# Patient Record
Sex: MALE | Race: WHITE | NOT HISPANIC OR LATINO | Employment: OTHER | URBAN - METROPOLITAN AREA
[De-identification: names, ages, dates, MRNs, and addresses within clinical notes are randomized per-mention and may not be internally consistent; named-entity substitution may affect disease eponyms.]

---

## 2017-01-05 ENCOUNTER — HOSPITAL ENCOUNTER (OUTPATIENT)
Dept: RADIATION ONCOLOGY | Facility: MEDICAL CENTER | Age: 82
End: 2017-01-31
Attending: RADIOLOGY
Payer: MEDICARE

## 2017-01-05 PROCEDURE — 99205 OFFICE O/P NEW HI 60 MIN: CPT | Performed by: RADIOLOGY

## 2017-01-05 PROCEDURE — 99214 OFFICE O/P EST MOD 30 MIN: CPT | Performed by: RADIOLOGY

## 2017-01-06 ENCOUNTER — HOME CARE VISIT (OUTPATIENT)
Dept: HOME HEALTH SERVICES | Facility: HOME HEALTHCARE | Age: 82
End: 2017-01-06
Payer: MEDICARE

## 2017-01-06 NOTE — CONSULTS
DATE OF SERVICE:  01/05/2017    DIAGNOSIS:  Stage II (T2 N0 M0) recurrent basal cell carcinoma of the left   nasal ala, status post Erivedge.    HISTORY OF PRESENT ILLNESS:  I had the pleasure of seeing the patient today in   consultation at the request of Dr. Faviola Lund for his recurrent basal   cell carcinoma.  Patient's son-in-law states that he feels his first basal   cell carcinoma in this location was greater than 10 years ago, which was   resected.  It did start slowly recurring several years ago, but then increased   in its aggressiveness.  He tried topical medications for this that progressed   through and most recently was sent to Dr. Lund as it was becoming more   ulcerative and bothersome in nature.  When she saw him the disease that   extended through the full thickness of the nasal ala all the way down to the   level of the nasal septum, this did not invade the nasal septum, but was   pushing border from the nasal ala.  An attempt to try to regress the disease   and make it a surgical possibility, she started him on Erivedge; however, he   was not able to continue the medication due to poor tolerance.  It did,   however, shrink somewhat in the blockage of his left nostril improved with   space between the septum and the tumor.  Currently it measures roughly 2.5x2.5   cm with a 1.5 cm thickness.  It does have an eroded surface, but no nivia   ulceration and no extension into adjacent structures.  I have been asked to   see him for consideration of definitive radiotherapy to this area.    PAST MEDICAL HISTORY:  Significant for stage IV kidney disease,   gastroesophageal reflux disorder, presumed prostate cancer, but not biopsy   proven, under active surveillance.    MEDICATIONS:  Include Prilosec, Atarax, and Prevnar.    ALLERGIES:  No known drug allergies.    FAMILY HISTORY:  Noncontributory for malignancy.    SOCIAL HISTORY:  Patient lives in [facility] in [facility].    His  emrlin is helping him with travel to his medical appointments.  He is a   retired [employer] professor.  He did have a remote smoking history of a half pack   per day.  Despite chronologic age of 89, he does roughly 100 pushups per day   and he is quite active.    PHYSICAL EXAMINATION:  VITAL SIGNS:  Patient's weight is 114 pounds, temperature 98.2, pulse of 92,   respirations of 16, blood pressure 158/73, oxygen saturation of 97% on room   oxygen.  Pain scale 0/10.  Performance status, ECOG 0.  EYES:  Reveals equally reactive pupils.  HEENT:  Reveals no lesions in the oral cavity or oropharynx.  The nostril on   the left side is compromised from the pushing border of the nasal ala tumor.    There is no direct extension into the septum or other nasal structures.  LUNGS:  Clear to auscultation bilaterally.  HEART:  Normal S1, S2.  ABDOMEN:  Soft and nontender.  MUSCULOSKELETAL:  Reveals no acute areas of bony tenderness or deformity.  NEUROLOGIC:  Grossly intact.  SKIN:  Reveals a 2.5x2.5x1.5 cm thick basal cell carcinoma in the right nasal   ala extending from the nasal crease, but not extending to the tip of nose.  It   encompasses the lower border of the nostril.  It has erosion, but no nivia   ulceration and there is no palpable lymphadenopathy.    ASSESSMENT:  Stage II (T2 N0 M0), recurrent basal cell carcinoma of the left   nasal ala, status post Erivedge.    PLAN:  Discussed with the patient and his stepson his diagnosis, prognosis,   and treatment options over roughly 1 hour and 5 minute time period, 95% of   that time dedicated to management decisions.  We distinguished with the   patient different skin cancers including basal cell carcinoma.  We discussed   the slower growing nature and locally destructive nature of basal cell as   opposed to regional spread.  This does extend through the cartilage making   some of the control rates slightly lower with radiotherapy; however, this is   not surgically appropriate  size and location and he has failed medication.    Therefore, radiation is a good treatment option for him.  I had my physics and   dosimetry team evaluate him with me to prepare for simulation.  We anticipate   superficial radiotherapy to 5750 cGy given in 23 equal fractions using   electron beam radiation, this will require custom bolus and a head   immobilization device for better setup and reproducibility.  After discussing   all the risks, benefits, and side effects of therapy and patient was amenable   to treatment and will come back next week for simulation.       ____________________________________     MD RONDA Kruse / N[facility]    DD:  01/05/2017 10:17:26  DT:  01/05/2017 17:58:40    D#:  [id number]  Job#:  [id number]    cc: ALLY GIBSON MD, Dr. Faviola Lund

## 2017-01-10 PROCEDURE — 77263 THER RADIOLOGY TX PLNG CPLX: CPT | Performed by: RADIOLOGY

## 2017-01-10 PROCEDURE — 77334 RADIATION TREATMENT AID(S): CPT | Mod: 26 | Performed by: RADIOLOGY

## 2017-01-10 PROCEDURE — 77334 RADIATION TREATMENT AID(S): CPT | Performed by: RADIOLOGY

## 2017-01-10 PROCEDURE — 77290 THER RAD SIMULAJ FIELD CPLX: CPT | Mod: 26 | Performed by: RADIOLOGY

## 2017-01-10 PROCEDURE — 77290 THER RAD SIMULAJ FIELD CPLX: CPT | Performed by: RADIOLOGY

## 2017-01-11 ASSESSMENT — ACTIVITIES OF DAILY LIVING (ADL)
OASIS_M1830: 01
HOME_HEALTH_OASIS: 01
HOME_HEALTH_OASIS: 00

## 2017-01-16 PROCEDURE — 77295 3-D RADIOTHERAPY PLAN: CPT | Performed by: RADIOLOGY

## 2017-01-16 PROCEDURE — 77334 RADIATION TREATMENT AID(S): CPT | Performed by: RADIOLOGY

## 2017-01-16 PROCEDURE — 77295 3-D RADIOTHERAPY PLAN: CPT | Mod: 26 | Performed by: RADIOLOGY

## 2017-01-16 PROCEDURE — 77334 RADIATION TREATMENT AID(S): CPT | Mod: 26 | Performed by: RADIOLOGY

## 2017-01-16 PROCEDURE — 77300 RADIATION THERAPY DOSE PLAN: CPT | Mod: 26 | Performed by: RADIOLOGY

## 2017-01-16 PROCEDURE — 77300 RADIATION THERAPY DOSE PLAN: CPT | Performed by: RADIOLOGY

## 2017-01-17 PROCEDURE — 77280 THER RAD SIMULAJ FIELD SMPL: CPT | Performed by: RADIOLOGY

## 2017-01-17 PROCEDURE — 77412 RADIATION TX DELIVERY LVL 3: CPT | Performed by: RADIOLOGY

## 2017-01-18 ENCOUNTER — APPOINTMENT (OUTPATIENT)
Dept: RADIATION ONCOLOGY | Facility: MEDICAL CENTER | Age: 82
End: 2017-01-18
Attending: RADIOLOGY
Payer: MEDICARE

## 2017-01-18 PROCEDURE — 77412 RADIATION TX DELIVERY LVL 3: CPT | Performed by: RADIOLOGY

## 2017-01-19 PROCEDURE — 77412 RADIATION TX DELIVERY LVL 3: CPT | Performed by: RADIOLOGY

## 2017-01-19 PROCEDURE — 77336 RADIATION PHYSICS CONSULT: CPT | Performed by: RADIOLOGY

## 2017-01-20 PROCEDURE — 77412 RADIATION TX DELIVERY LVL 3: CPT | Performed by: RADIOLOGY

## 2017-01-23 PROCEDURE — 77412 RADIATION TX DELIVERY LVL 3: CPT | Performed by: RADIOLOGY

## 2017-01-23 PROCEDURE — 77427 RADIATION TX MANAGEMENT X5: CPT | Performed by: RADIOLOGY

## 2017-01-24 ENCOUNTER — TELEPHONE (OUTPATIENT)
Dept: INTERNAL MEDICINE | Facility: MEDICAL CENTER | Age: 82
End: 2017-01-24

## 2017-01-24 DIAGNOSIS — H61.20 IMPACTED CERUMEN, UNSPECIFIED LATERALITY: ICD-10-CM

## 2017-01-24 PROCEDURE — 77412 RADIATION TX DELIVERY LVL 3: CPT | Performed by: RADIOLOGY

## 2017-01-24 NOTE — TELEPHONE ENCOUNTER
----- Message from Claire Cotton sent at 1/24/2017  8:36 AM PST -----  Regarding: Dr Cm/ENT doctor  Contact: [phone]  Patients son (Da) would like to know if you could refer dad to a ENT doctor for wax build up in his ears.

## 2017-01-24 NOTE — TELEPHONE ENCOUNTER
i put referral in.   When i looked in July there was no wax  If he wants, he can come here and we can see if there is wax that needs to be removed.   We may or may not be able to remove.    Also, he could go to his audiologist who might be able to remove it.

## 2017-01-24 NOTE — TELEPHONE ENCOUNTER
Called pt and spoke with Da. He said pt audiologist said to go to his dr to remove wax. He said we can schedule him an appt here. Scheduled pt to be seen tomorrow on 01/25/17 with Dr Ramirez

## 2017-01-25 ENCOUNTER — OFFICE VISIT (OUTPATIENT)
Dept: INTERNAL MEDICINE | Facility: MEDICAL CENTER | Age: 82
End: 2017-01-25
Payer: MEDICARE

## 2017-01-25 VITALS
RESPIRATION RATE: 16 BRPM | OXYGEN SATURATION: 95 % | SYSTOLIC BLOOD PRESSURE: 116 MMHG | BODY MASS INDEX: 22.01 KG/M2 | HEART RATE: 74 BPM | HEIGHT: 61 IN | TEMPERATURE: 98.2 F | DIASTOLIC BLOOD PRESSURE: 76 MMHG | WEIGHT: 116.6 LBS

## 2017-01-25 DIAGNOSIS — H61.23 BILATERAL IMPACTED CERUMEN: ICD-10-CM

## 2017-01-25 PROCEDURE — 77412 RADIATION TX DELIVERY LVL 3: CPT | Performed by: RADIOLOGY

## 2017-01-25 PROCEDURE — G8419 CALC BMI OUT NRM PARAM NOF/U: HCPCS | Mod: GC | Performed by: INTERNAL MEDICINE

## 2017-01-25 PROCEDURE — G8432 DEP SCR NOT DOC, RNG: HCPCS | Mod: GC | Performed by: INTERNAL MEDICINE

## 2017-01-25 PROCEDURE — 77331 SPECIAL RADIATION DOSIMETRY: CPT | Performed by: RADIOLOGY

## 2017-01-25 PROCEDURE — 1100F PTFALLS ASSESS-DOCD GE2>/YR: CPT | Mod: GC | Performed by: INTERNAL MEDICINE

## 2017-01-25 PROCEDURE — 4040F PNEUMOC VAC/ADMIN/RCVD: CPT | Mod: GC | Performed by: INTERNAL MEDICINE

## 2017-01-25 PROCEDURE — G8482 FLU IMMUNIZE ORDER/ADMIN: HCPCS | Mod: GC | Performed by: INTERNAL MEDICINE

## 2017-01-25 PROCEDURE — 3288F FALL RISK ASSESSMENT DOCD: CPT | Mod: GC | Performed by: INTERNAL MEDICINE

## 2017-01-25 PROCEDURE — 0518F FALL PLAN OF CARE DOCD: CPT | Mod: 8P,GC | Performed by: INTERNAL MEDICINE

## 2017-01-25 PROCEDURE — 1036F TOBACCO NON-USER: CPT | Mod: GC | Performed by: INTERNAL MEDICINE

## 2017-01-25 PROCEDURE — 77331 SPECIAL RADIATION DOSIMETRY: CPT | Mod: 26 | Performed by: RADIOLOGY

## 2017-01-25 PROCEDURE — 99213 OFFICE O/P EST LOW 20 MIN: CPT | Mod: GE | Performed by: INTERNAL MEDICINE

## 2017-01-25 ASSESSMENT — PATIENT HEALTH QUESTIONNAIRE - PHQ9: CLINICAL INTERPRETATION OF PHQ2 SCORE: 0

## 2017-01-25 NOTE — PROGRESS NOTES
Established Patient    Emmanuel presents today with the following:    CC: Ear wax removal    HPI: Patient is a pleasant 89-year-old male with multiple comorbidities who comes in today complaining of left ear pain due to cerumen impaction. Patient states he has had this problem before in the recent years and has had ENT doctors remove the wax. Yesterday afternoon doctor to come on placed a referral for ENT and scheduled him to come in today for wax removal. He comes in today because the pain in his left ear is increasing. He denies fevers, changes in hearing, dizziness, vertigo, headache or discharge from either ear.    Patient Active Problem List    Diagnosis Date Noted   • Physician orders for life-sustaining treatment (POLST) form indicates patient wish for do-not-resuscitate status 12/07/2016   • Ingrown toenail without infection 11/30/2016   • Onychomycosis 11/30/2016   • Hematoma of toe of left foot 11/30/2016   • Nonpalpable pulse 11/30/2016   • Toe pain, left 11/30/2016   • Anemia, unspecified 08/26/2016   • Leukocytosis 08/26/2016   • Insomnia 07/07/2016   • Short-term memory loss 07/07/2016   • CKD (chronic kidney disease) stage 4, GFR 15-29 ml/min (CMS-HCC) 07/07/2016   • GERD without esophagitis 07/07/2016   • Benign essential HTN 07/07/2016   • Anemia of unknown etiology 07/07/2016   • Lower urinary tract symptoms 07/07/2016   • 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 90 Cap 3   • hydrOXYzine (ATARAX) 25 MG Tab Take 0.5 Tabs by mouth 3 times a day as needed for Anxiety. 30 Tab 0   •  "non-formulary med Indications: multiple vits & herbs     • NON SPECIFIED Shitake Mushrooms capsule. SIG: Take two capsules po TID     • PREVNAR 13 syringe        No current facility-administered medications for this visit.       ROS: As per HPI. Additional pertinent symptoms as noted below.    Constitutional: No fever/chills  GEN: Denies anxiety  Neuro: No headache, dizziness, tinnitus  ENT: No vision changes, mild decrease in hearing  CV: No chest pain, palpitations  Resp: No cough  GI: N/V, diarrhea, constipation, abdominal pain rectal bleeding  Skin: No rashes  : No dysuria, nocturia, hesitancy, incontinence, hematuria  Musculoskeletal: No muscle/joint pain, edema          /76 mmHg  Pulse 74  Temp(Src) 36.8 °C (98.2 °F)  Resp 16  Ht 1.549 m (5' 1\")  Wt 52.889 kg (116 lb 9.6 oz)  BMI 22.04 kg/m2  SpO2 95%    Physical Exam   Constitutional:  oriented to person, place, and time. No distress.   Eyes: Pupils are equal, round, and reactive to light. No scleral icterus.  Neck: Neck supple. No thyromegaly present.   Cardiovascular: Normal rate, regular rhythm and normal heart sounds.  Exam reveals no gallop and no friction rub.  No murmur heard.  Pulmonary/Chest: Breath sounds normal. Chest wall is not dull to percussion.   Neurological: alert and oriented to person, place, and time.   Skin: No cyanosis. Nails show no clubbing.  Other: Unable to visualize tympanic membrane in left ear due to cerumen impaction. Partially visible tympanic membrane on the right side      Assessment and Plan    1. Bilateral impacted cerumen  -Pressure-like Left ear pain  -Bilateral impacted cerumen  -Used curette to remove cerumen on left side where was painful. Was able to remove a portion of the wax and relieved patient's pain.  -Advised patient to set an appointment with ENT and to use mineral oil on cotton swabs for the next couple days and then a capful of hydrogen peroxide in each ear once a day until he is able to see " ENT.        Signed by: Vesta Ramirez M.D.

## 2017-01-25 NOTE — PATIENT INSTRUCTIONS
Carbamide Peroxide ear solution  What is this medicine?  CARBAMIDE PEROXIDE (CAR carreon mide per OX jessica) is used to soften and help remove ear wax.  This medicine may be used for other purposes; ask your health care provider or pharmacist if you have questions.  COMMON BRAND NAME(S): Auro Ear, Auro Earache Relief, Debrox, Ear Drops, Ear Wax Removal , Ear Wax Remover, Earwax Treatment , Murine, Thera-Ear  What should I tell my health care provider before I take this medicine?  They need to know if you have any of these conditions:  -dizziness  -ear discharge  -ear pain, irritation or rash  -infection  -perforated eardrum (hole in eardrum)  -an unusual or allergic reaction to carbamide peroxide, glycerin, hydrogen peroxide, other medicines, foods, dyes, or preservatives  -pregnant or trying to get pregnant  -breast-feeding  How should I use this medicine?  This medicine is only for use in the outer ear canal. Follow the directions carefully. Wash hands before and after use. The solution may be warmed by holding the bottle in the hand for 1 to 2 minutes. Lie with the affected ear facing upward. Place the proper number of drops into the ear canal. After the drops are instilled, remain lying with the affected ear upward for 5 minutes to help the drops stay in the ear canal. A cotton ball may be gently inserted at the ear opening for no longer than 5 to 10 minutes to ensure retention. Repeat, if necessary, for the opposite ear. Do not touch the tip of the dropper to the ear, fingertips, or other surface. Do not rinse the dropper after use. Keep container tightly closed.  Talk to your pediatrician regarding the use of this medicine in children. While this drug may be used in children as young as 12 years for selected conditions, precautions do apply.  Overdosage: If you think you have taken too much of this medicine contact a poison control center or emergency room at once.  NOTE: This medicine is only for you. Do not share  this medicine with others.  What if I miss a dose?  If you miss a dose, use it as soon as you can. If it is almost time for your next dose, use only that dose. Do not use double or extra doses.  What may interact with this medicine?  Interactions are not expected. Do not use any other ear products without asking your doctor or health care professional.  This list may not describe all possible interactions. Give your health care provider a list of all the medicines, herbs, non-prescription drugs, or dietary supplements you use. Also tell them if you smoke, drink alcohol, or use illegal drugs. Some items may interact with your medicine.  What should I watch for while using this medicine?  This medicine is not for long-term use. Do not use for more than 4 days without checking with your health care professional. Contact your doctor or health care professional if your condition does not start to get better within a few days or if you notice burning, redness, itching or swelling.  What side effects may I notice from receiving this medicine?  Side effects that you should report to your doctor or health care professional as soon as possible:  -allergic reactions like skin rash, itching or hives, swelling of the face, lips, or tongue  -burning, itching, and redness  -worsening ear pain  -rash  Side effects that usually do not require medical attention (report to your doctor or health care professional if they continue or are bothersome):  -abnormal sensation while putting the drops in the ear  -temporary reduction in hearing (but not complete loss of hearing)  This list may not describe all possible side effects. Call your doctor for medical advice about side effects. You may report side effects to FDA at 4-165-FDA-0500.  Where should I keep my medicine?  Keep out of the reach of children.  Store at room temperature between 15 and 30 degrees C (59 and 86 degrees F) in a tight, light-resistant container. Keep bottle away  from excessive heat and direct sunlight. Throw away any unused medicine after the expiration date.  NOTE: This sheet is a summary. It may not cover all possible information. If you have questions about this medicine, talk to your doctor, pharmacist, or health care provider.  © 2014, [employer]. (3/31/2009 2:00:02 PM)      Put mineral oil on cotton ball and put in the entrance of both ears once a day for 5 days, and after that if you still have wax in ears put a capfull of hydrogen peroxide in each ear for a few seconds and then let it drain. You can do this once a day until wax build up improves.  Follow up with PCP in March for management of other co morbidities.

## 2017-01-25 NOTE — MR AVS SNAPSHOT
"Emmanuel Huerta   2017 9:30 AM   Office Visit   MRN: [id number]    Department:  [facility] Med - Internal Med   Dept Phone:  [phone]    Description:  Male : 1921   Provider:  Vesta Ramirez M.D.           Reason for Visit     Cerumen Impaction bilateral wax build up      Allergies as of 2017     No Known Allergies      You were diagnosed with     Bilateral impacted cerumen   [633811]         Vital Signs     Blood Pressure Pulse Temperature Respirations Height Weight    116/76 mmHg 74 36.8 °C (98.2 °F) 16 1.549 m (5' 1\") 52.889 kg (116 lb 9.6 oz)    Body Mass Index Oxygen Saturation Smoking Status             22.04 kg/m2 95% Former Smoker         Basic Information     Date Of Birth Sex Race Ethnicity Preferred Language    1921 Male White Non- English      Your appointments     Mar 31, 2017 10:40 AM   Established Patient with Stacy Cm M.D.   [facility] Med - Internal Medicine (--)    [address] [place] [id number] [phone]           You will be receiving a confirmation call a few days before your appointment from our automated call confirmation system.              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   3/26/2014 - Present    Insomnia G47.00   2016 - Present    Short-term memory loss R41.3   2016 - Present    CKD (chronic kidney disease) stage 4, GFR 15-29 ml/min (CMS-HCC) N18.4   2016 - Present    GERD without esophagitis K21.9   2016 - Present    Benign essential " HTN I10   7/7/2016 - Present    Anemia of unknown etiology D64.9   7/7/2016 - Present    Lower urinary tract symptoms R39.9   7/7/2016 - Present    Anemia, unspecified D64.9   8/26/2016 - Present    Leukocytosis D72.829   8/26/2016 - Present    Ingrown toenail without infection L60.0   11/30/2016 - Present    Onychomycosis B35.1   11/30/2016 - Present    Hematoma of toe of left foot S90.122A   11/30/2016 - Present    Nonpalpable pulse Z78.9   11/30/2016 - Present    Toe pain, left M79.675   11/30/2016 - Present    Physician orders for life-sustaining treatment (POLST) form indicates patient wish for do-not-resuscitate status Z66   12/7/2016 - Present      Health Maintenance        Date Due Completion Dates    IMM ZOSTER VACCINE 11/30/1981 ---    IMM DTaP/Tdap/Td Vaccine (2 - Td) 4/24/2023 4/24/2013            Current Immunizations     13-VALENT PCV PREVNAR 9/22/2016    Influenza TIV (IM) 10/1/2014, 10/2/2013    Influenza Vaccine Adult HD 11/29/2016 12:05 PM    Pneumococcal polysaccharide vaccine (PPSV-23) 7/30/2014    Tdap Vaccine 4/24/2013      Below and/or attached are the medications your provider expects you to take. Review all of your home medications and newly ordered medications with your provider and/or pharmacist. Follow medication instructions as directed by your provider and/or pharmacist. Please keep your medication list with you and share with your provider. Update the information when medications are discontinued, doses are changed, or new medications (including over-the-counter products) are added; and carry medication information at all times in the event of emergency situations     Allergies:  No Known Allergies          Medications  Valid as of: January 25, 2017 -  9:59 AM    Generic Name Brand Name Tablet Size Instructions for use    HydrOXYzine HCl (Tab) ATARAX 25 MG Take 0.5 Tabs by mouth 3 times a day as needed for Anxiety.        NON SPECIFIED   Shitake Mushrooms capsule. SIG: Take two  capsules po TID        non-formulary med non-formulary med  Indications: multiple vits & herbs        Omeprazole (CAPSULE DELAYED RELEASE) PRILOSEC 20 MG Take 1 Cap by mouth every day.        Pneumococcal 13-Naty Conj Vacc (Suspension) PREVNAR 13          .                 Medicines prescribed today were sent to:     [facility] - [place], [place] - [address]    [address] [place] [address]    Phone: [phone] Fax: [phone]    Open 24 Hours?: No      Medication refill instructions:       If your prescription bottle indicates you have medication refills left, it is not necessary to call your provider’s office. Please contact your pharmacy and they will refill your medication.    If your prescription bottle indicates you do not have any refills left, you may request refills at any time through one of the following ways: The online [employer] system (except Urgent Care), by calling your provider’s office, or by asking your pharmacy to contact your provider’s office with a refill request. Medication refills are processed only during regular business hours and may not be available until the next business day. Your provider may request additional information or to have a follow-up visit with you prior to refilling your medication.   *Please Note: Medication refills are assigned a new Rx number when refilled electronically. Your pharmacy may indicate that no refills were authorized even though a new prescription for the same medication is available at the pharmacy. Please request the medicine by name with the pharmacy before contacting your provider for a refill.        Instructions    Carbamide Peroxide ear solution  What is this medicine?  CARBAMIDE PEROXIDE (CAR carreon mide per OX jessica) is used to soften and help remove ear wax.  This medicine may be used for other purposes; ask your health care provider or pharmacist if you have questions.  COMMON BRAND NAME(S): Auro Ear, Auro Earache Relief, Debrox, Ear Drops, Ear Wax  Removal , Ear Wax Remover, Earwax Treatment , Murine, Thera-Ear  What should I tell my health care provider before I take this medicine?  They need to know if you have any of these conditions:  -dizziness  -ear discharge  -ear pain, irritation or rash  -infection  -perforated eardrum (hole in eardrum)  -an unusual or allergic reaction to carbamide peroxide, glycerin, hydrogen peroxide, other medicines, foods, dyes, or preservatives  -pregnant or trying to get pregnant  -breast-feeding  How should I use this medicine?  This medicine is only for use in the outer ear canal. Follow the directions carefully. Wash hands before and after use. The solution may be warmed by holding the bottle in the hand for 1 to 2 minutes. Lie with the affected ear facing upward. Place the proper number of drops into the ear canal. After the drops are instilled, remain lying with the affected ear upward for 5 minutes to help the drops stay in the ear canal. A cotton ball may be gently inserted at the ear opening for no longer than 5 to 10 minutes to ensure retention. Repeat, if necessary, for the opposite ear. Do not touch the tip of the dropper to the ear, fingertips, or other surface. Do not rinse the dropper after use. Keep container tightly closed.  Talk to your pediatrician regarding the use of this medicine in children. While this drug may be used in children as young as 12 years for selected conditions, precautions do apply.  Overdosage: If you think you have taken too much of this medicine contact a poison control center or emergency room at once.  NOTE: This medicine is only for you. Do not share this medicine with others.  What if I miss a dose?  If you miss a dose, use it as soon as you can. If it is almost time for your next dose, use only that dose. Do not use double or extra doses.  What may interact with this medicine?  Interactions are not expected. Do not use any other ear products without asking your doctor or health care  professional.  This list may not describe all possible interactions. Give your health care provider a list of all the medicines, herbs, non-prescription drugs, or dietary supplements you use. Also tell them if you smoke, drink alcohol, or use illegal drugs. Some items may interact with your medicine.  What should I watch for while using this medicine?  This medicine is not for long-term use. Do not use for more than 4 days without checking with your health care professional. Contact your doctor or health care professional if your condition does not start to get better within a few days or if you notice burning, redness, itching or swelling.  What side effects may I notice from receiving this medicine?  Side effects that you should report to your doctor or health care professional as soon as possible:  -allergic reactions like skin rash, itching or hives, swelling of the face, lips, or tongue  -burning, itching, and redness  -worsening ear pain  -rash  Side effects that usually do not require medical attention (report to your doctor or health care professional if they continue or are bothersome):  -abnormal sensation while putting the drops in the ear  -temporary reduction in hearing (but not complete loss of hearing)  This list may not describe all possible side effects. Call your doctor for medical advice about side effects. You may report side effects to FDA at 3-027-FDA-4684.  Where should I keep my medicine?  Keep out of the reach of children.  Store at room temperature between 15 and 30 degrees C (59 and 86 degrees F) in a tight, light-resistant container. Keep bottle away from excessive heat and direct sunlight. Throw away any unused medicine after the expiration date.  NOTE: This sheet is a summary. It may not cover all possible information. If you have questions about this medicine, talk to your doctor, pharmacist, or health care provider.  © 2014, Elsevier/Gold Standard. (3/31/2009 2:00:02 PM)      Put  mineral oil on cotton ball and put in the entrance of both ears once a day for 5 days, and after that if you still have wax in ears put a capfull of hydrogen peroxide in each ear for a few seconds and then let it drain. You can do this once a day until wax build up improves.  Follow up with PCP in March for management of other co morbidities.            "[employer] Access Code: [email or web address]  Expires: 2/4/2017  7:56 AM    "[employer]  A secure, online tool to manage your health information     [employer]’s "[employer]® is a secure, online tool that connects you to your personalized health information from the privacy of your home -- day or night - making it very easy for you to manage your healthcare. Once the activation process is completed, you can even access your medical information using the "[employer] anay, which is available for free in the Apple Anay store or Google Play store.     "[employer] provides the following levels of access (as shown below):   My Chart Features   [facility] Doctor [facility]  Specialists [facility]  Urgent  Care Non-[facility]  Primary Care  Doctor   Email your healthcare team securely and privately 24/7 X X X    Manage appointments: schedule your next appointment; view details of past/upcoming appointments X      Request prescription refills. X      View recent personal medical records, including lab and immunizations X X X X   View health record, including health history, allergies, medications X X X X   Read reports about your outpatient visits, procedures, consult and ER notes X X X X   See your discharge summary, which is a recap of your hospital and/or ER visit that includes your diagnosis, lab results, and care plan. X X       How to register for "[employer]:  1. Go to  [email or web address].  2. Click on the Sign Up Now box, which takes you to the New Member Sign Up page. You will need to provide the following information:  a. Enter your "[employer] Access Code exactly as it appears at the top  of this page. (You will not need to use this code after you’ve completed the sign-up process. If you do not sign up before the expiration date, you must request a new code.)   b. Enter your date of birth.   c. Enter your home email address.   d. Click Submit, and follow the next screen’s instructions.  3. Create a WhoSay ID. This will be your WhoSay login ID and cannot be changed, so think of one that is secure and easy to remember.  4. Create a WhoSay password. You can change your password at any time.  5. Enter your Password Reset Question and Answer. This can be used at a later time if you forget your password.   6. Enter your e-mail address. This allows you to receive e-mail notifications when new information is available in WhoSay.  7. Click Sign Up. You can now view your health information.    For assistance activating your [employer] account, call [phone]

## 2017-01-26 PROCEDURE — 77412 RADIATION TX DELIVERY LVL 3: CPT | Performed by: RADIOLOGY

## 2017-01-26 PROCEDURE — 77336 RADIATION PHYSICS CONSULT: CPT | Performed by: RADIOLOGY

## 2017-01-27 PROCEDURE — 77412 RADIATION TX DELIVERY LVL 3: CPT | Performed by: RADIOLOGY

## 2017-01-30 ENCOUNTER — HOSPITAL ENCOUNTER (OUTPATIENT)
Dept: RADIATION ONCOLOGY | Facility: MEDICAL CENTER | Age: 82
End: 2017-01-30

## 2017-01-30 PROCEDURE — 77412 RADIATION TX DELIVERY LVL 3: CPT | Performed by: RADIOLOGY

## 2017-01-30 PROCEDURE — 77427 RADIATION TX MANAGEMENT X5: CPT | Performed by: RADIOLOGY

## 2017-01-31 ENCOUNTER — HOSPITAL ENCOUNTER (OUTPATIENT)
Dept: RADIATION ONCOLOGY | Facility: MEDICAL CENTER | Age: 82
End: 2017-01-31

## 2017-01-31 PROCEDURE — 77412 RADIATION TX DELIVERY LVL 3: CPT | Performed by: RADIOLOGY

## 2017-02-01 ENCOUNTER — HOSPITAL ENCOUNTER (OUTPATIENT)
Dept: RADIATION ONCOLOGY | Facility: MEDICAL CENTER | Age: 82
End: 2017-02-28
Attending: RADIOLOGY
Payer: MEDICARE

## 2017-02-01 ENCOUNTER — TELEPHONE (OUTPATIENT)
Dept: HEMATOLOGY ONCOLOGY | Facility: MEDICAL CENTER | Age: 82
End: 2017-02-01

## 2017-02-01 PROCEDURE — 77412 RADIATION TX DELIVERY LVL 3: CPT | Performed by: RADIOLOGY

## 2017-02-01 NOTE — TELEPHONE ENCOUNTER
Patient's weight declined further this week to 114 lbs, per stand up scale in Rtx = 1.7% loss, significant.  RD attempted to call patient via telephone for nutrition f/u.  Patient is currently unavailable.  RD left voicemail with contact information.  Please have patient call dietitian at [phone].

## 2017-02-02 ENCOUNTER — TELEPHONE (OUTPATIENT)
Dept: HEMATOLOGY ONCOLOGY | Facility: MEDICAL CENTER | Age: 82
End: 2017-02-02

## 2017-02-02 PROCEDURE — 77336 RADIATION PHYSICS CONSULT: CPT | Performed by: RADIOLOGY

## 2017-02-02 PROCEDURE — 77412 RADIATION TX DELIVERY LVL 3: CPT | Performed by: RADIOLOGY

## 2017-02-02 NOTE — TELEPHONE ENCOUNTER
Nutrition Services: Update  Received call from patients son, Francisco, who reports that patient has been eating very well and taking ensure supplements twice per day as we previously discussed.  He reports a stable weight at home and that his appetite has improved per the staff where he lives.  Weight this week is 114 pounds which is down from last week of 116 pounds, however it is stable with initial weight 1/5 of 114 pounds.  Difference could be related to clothes the patient is wearing last week - RD will continue to monitor for trends.  Praised family for their efforts and again reiterated the importance of adequate nutrition.  Asked son to contact us should he have questions or concerns.    RD will continue to follow weekly.

## 2017-02-03 PROCEDURE — 77412 RADIATION TX DELIVERY LVL 3: CPT | Performed by: RADIOLOGY

## 2017-02-06 PROCEDURE — 77412 RADIATION TX DELIVERY LVL 3: CPT | Performed by: RADIOLOGY

## 2017-02-06 PROCEDURE — 77427 RADIATION TX MANAGEMENT X5: CPT | Performed by: RADIOLOGY

## 2017-02-07 PROCEDURE — 77412 RADIATION TX DELIVERY LVL 3: CPT | Performed by: RADIOLOGY

## 2017-02-08 PROCEDURE — 77412 RADIATION TX DELIVERY LVL 3: CPT | Performed by: RADIOLOGY

## 2017-02-09 PROCEDURE — 77336 RADIATION PHYSICS CONSULT: CPT | Performed by: RADIOLOGY

## 2017-02-09 PROCEDURE — 77412 RADIATION TX DELIVERY LVL 3: CPT | Performed by: RADIOLOGY

## 2017-02-10 PROCEDURE — 77412 RADIATION TX DELIVERY LVL 3: CPT | Performed by: RADIOLOGY

## 2017-02-14 PROCEDURE — 77427 RADIATION TX MANAGEMENT X5: CPT | Performed by: RADIOLOGY

## 2017-02-14 PROCEDURE — 77412 RADIATION TX DELIVERY LVL 3: CPT | Performed by: RADIOLOGY

## 2017-02-15 ENCOUNTER — TELEPHONE (OUTPATIENT)
Dept: HEMATOLOGY ONCOLOGY | Facility: MEDICAL CENTER | Age: 82
End: 2017-02-15

## 2017-02-15 PROCEDURE — 77412 RADIATION TX DELIVERY LVL 3: CPT | Performed by: RADIOLOGY

## 2017-02-16 DIAGNOSIS — F41.9 ANXIETY: ICD-10-CM

## 2017-02-16 PROCEDURE — 77412 RADIATION TX DELIVERY LVL 3: CPT | Performed by: RADIOLOGY

## 2017-02-16 RX ORDER — HYDROXYZINE HYDROCHLORIDE 25 MG/1
12.5 TABLET, FILM COATED ORAL 3 TIMES DAILY PRN
Qty: 30 TAB | Refills: 0 | Status: SHIPPED | OUTPATIENT
Start: 2017-02-16 | End: 2017-04-12 | Stop reason: SDUPTHER

## 2017-02-16 NOTE — TELEPHONE ENCOUNTER
----- Message from Elli Solis sent at 2/16/2017  9:52 AM PST -----  Regarding: Dr Cm: Hydroxyzine Rf  Contact: [phone]  1. Caller Name: Da-son                       2. Call Back Number: [phone]    3. Patient PCP: Dr cm    4. What is the patient requesting: Pt needs refill of Hydroxyzine sent into [facility]. Da said he would like to p/u the rx tomorrow as he will be in town for Mr Jessi radiation. Please call Da once sent in.     5. Does patient need immediate contact: No

## 2017-02-16 NOTE — TELEPHONE ENCOUNTER
Last seen: 01/25/17 by Dr. Ramirez  Next appt: 03/31/17 with Dr. Cm    Was the patient seen in the last year in this department? Yes   Does patient have an active prescription for medications requested? No   Received Request Via: Pharmacy

## 2017-02-21 PROCEDURE — 77412 RADIATION TX DELIVERY LVL 3: CPT | Performed by: RADIOLOGY

## 2017-02-21 PROCEDURE — 77336 RADIATION PHYSICS CONSULT: CPT | Performed by: RADIOLOGY

## 2017-02-21 PROCEDURE — 77427 RADIATION TX MANAGEMENT X5: CPT | Performed by: RADIOLOGY

## 2017-03-28 ENCOUNTER — TELEPHONE (OUTPATIENT)
Dept: INTERNAL MEDICINE | Facility: MEDICAL CENTER | Age: 82
End: 2017-03-28

## 2017-03-28 DIAGNOSIS — N18.4 CKD (CHRONIC KIDNEY DISEASE) STAGE 4, GFR 15-29 ML/MIN (HCC): ICD-10-CM

## 2017-03-28 DIAGNOSIS — Z12.5 ENCOUNTER FOR PROSTATE CANCER SCREENING: ICD-10-CM

## 2017-03-28 DIAGNOSIS — D72.829 LEUKOCYTOSIS, UNSPECIFIED TYPE: ICD-10-CM

## 2017-03-28 NOTE — TELEPHONE ENCOUNTER
1. Caller Name: Da                      Call Back Number: [phone] (home)       2. Message: Da called and is requesting a lab slip for pt to get done prior to his appt on 04/12/17. They do to [facility].     3. Patient approves office to leave a detailed voicemail/MyChart message: N\A

## 2017-03-28 NOTE — TELEPHONE ENCOUNTER
i ordered some labs that he can get before appt.   If he wants to wait until after that is fine as well in case i add on more.

## 2017-04-05 ENCOUNTER — HOSPITAL ENCOUNTER (OUTPATIENT)
Dept: LAB | Facility: MEDICAL CENTER | Age: 82
End: 2017-04-05
Attending: INTERNAL MEDICINE
Payer: MEDICARE

## 2017-04-05 DIAGNOSIS — D72.829 LEUKOCYTOSIS, UNSPECIFIED TYPE: ICD-10-CM

## 2017-04-05 DIAGNOSIS — Z12.5 ENCOUNTER FOR PROSTATE CANCER SCREENING: ICD-10-CM

## 2017-04-05 DIAGNOSIS — N18.4 CKD (CHRONIC KIDNEY DISEASE) STAGE 4, GFR 15-29 ML/MIN (HCC): ICD-10-CM

## 2017-04-05 LAB
ANION GAP SERPL CALC-SCNC: 8 MMOL/L (ref 0–11.9)
ANISOCYTOSIS BLD QL SMEAR: ABNORMAL
BASOPHILS # BLD AUTO: 0 % (ref 0–1.8)
BASOPHILS # BLD: 0 K/UL (ref 0–0.12)
BUN SERPL-MCNC: 31 MG/DL (ref 8–22)
CALCIUM SERPL-MCNC: 9 MG/DL (ref 8.5–10.5)
CHLORIDE SERPL-SCNC: 101 MMOL/L (ref 96–112)
CO2 SERPL-SCNC: 29 MMOL/L (ref 20–33)
CREAT SERPL-MCNC: 1.74 MG/DL (ref 0.5–1.4)
EOSINOPHIL # BLD AUTO: 0.11 K/UL (ref 0–0.51)
EOSINOPHIL NFR BLD: 0.9 % (ref 0–6.9)
ERYTHROCYTE [DISTWIDTH] IN BLOOD BY AUTOMATED COUNT: 49.4 FL (ref 35.9–50)
GFR SERPL CREATININE-BSD FRML MDRD: 37 ML/MIN/1.73 M 2
GLUCOSE SERPL-MCNC: 88 MG/DL (ref 65–99)
HCT VFR BLD AUTO: 42.1 % (ref 42–52)
HGB BLD-MCNC: 14 G/DL (ref 14–18)
LYMPHOCYTES # BLD AUTO: 9.05 K/UL (ref 1–4.8)
LYMPHOCYTES NFR BLD: 72.4 % (ref 22–41)
MANUAL DIFF BLD: NORMAL
MCH RBC QN AUTO: 32.7 PG (ref 27–33)
MCHC RBC AUTO-ENTMCNC: 33.3 G/DL (ref 33.7–35.3)
MCV RBC AUTO: 98.4 FL (ref 81.4–97.8)
MICROCYTES BLD QL SMEAR: ABNORMAL
MONOCYTES # BLD AUTO: 0.21 K/UL (ref 0–0.85)
MONOCYTES NFR BLD AUTO: 1.7 % (ref 0–13.4)
MORPHOLOGY BLD-IMP: NORMAL
NEUTROPHILS # BLD AUTO: 3.13 K/UL (ref 1.82–7.42)
NEUTROPHILS NFR BLD: 25 % (ref 44–72)
NRBC # BLD AUTO: 0 K/UL
NRBC BLD AUTO-RTO: 0 /100 WBC
OVALOCYTES BLD QL SMEAR: NORMAL
PLATELET # BLD AUTO: 164 K/UL (ref 164–446)
PLATELET BLD QL SMEAR: NORMAL
PMV BLD AUTO: 8.8 FL (ref 9–12.9)
POIKILOCYTOSIS BLD QL SMEAR: NORMAL
POTASSIUM SERPL-SCNC: 4.3 MMOL/L (ref 3.6–5.5)
PSA SERPL-MCNC: 43.23 NG/ML (ref 0–4)
RBC # BLD AUTO: 4.28 M/UL (ref 4.7–6.1)
RBC BLD AUTO: PRESENT
SODIUM SERPL-SCNC: 138 MMOL/L (ref 135–145)
VARIANT LYMPHS BLD QL SMEAR: NORMAL
WBC # BLD AUTO: 12.5 K/UL (ref 4.8–10.8)

## 2017-04-05 PROCEDURE — 85007 BL SMEAR W/DIFF WBC COUNT: CPT

## 2017-04-05 PROCEDURE — 80048 BASIC METABOLIC PNL TOTAL CA: CPT

## 2017-04-05 PROCEDURE — 85027 COMPLETE CBC AUTOMATED: CPT

## 2017-04-05 PROCEDURE — 84153 ASSAY OF PSA TOTAL: CPT

## 2017-04-05 PROCEDURE — 36415 COLL VENOUS BLD VENIPUNCTURE: CPT

## 2017-04-12 ENCOUNTER — OFFICE VISIT (OUTPATIENT)
Dept: INTERNAL MEDICINE | Facility: MEDICAL CENTER | Age: 82
End: 2017-04-12
Payer: MEDICARE

## 2017-04-12 VITALS
SYSTOLIC BLOOD PRESSURE: 146 MMHG | DIASTOLIC BLOOD PRESSURE: 66 MMHG | OXYGEN SATURATION: 93 % | HEART RATE: 75 BPM | WEIGHT: 118 LBS | TEMPERATURE: 98.1 F | BODY MASS INDEX: 22.28 KG/M2 | HEIGHT: 61 IN

## 2017-04-12 DIAGNOSIS — F41.9 ANXIETY: ICD-10-CM

## 2017-04-12 DIAGNOSIS — G47.00 INSOMNIA, UNSPECIFIED TYPE: ICD-10-CM

## 2017-04-12 DIAGNOSIS — C91.10 CLL (CHRONIC LYMPHOCYTIC LEUKEMIA) (HCC): ICD-10-CM

## 2017-04-12 DIAGNOSIS — M25.50 ARTHRALGIA, UNSPECIFIED JOINT: ICD-10-CM

## 2017-04-12 DIAGNOSIS — C44.321 SQUAMOUS CELL CARCINOMA OF NOSE: Chronic | ICD-10-CM

## 2017-04-12 DIAGNOSIS — R42 LIGHT HEADEDNESS: ICD-10-CM

## 2017-04-12 DIAGNOSIS — R97.20 ELEVATED PSA: ICD-10-CM

## 2017-04-12 DIAGNOSIS — R26.89 IMBALANCE: ICD-10-CM

## 2017-04-12 DIAGNOSIS — R41.89 COGNITIVE IMPAIRMENT: ICD-10-CM

## 2017-04-12 DIAGNOSIS — N18.4 CKD (CHRONIC KIDNEY DISEASE) STAGE 4, GFR 15-29 ML/MIN (HCC): ICD-10-CM

## 2017-04-12 LAB — EKG 4674: NORMAL

## 2017-04-12 PROCEDURE — 93000 ELECTROCARDIOGRAM COMPLETE: CPT | Performed by: INTERNAL MEDICINE

## 2017-04-12 PROCEDURE — 99215 OFFICE O/P EST HI 40 MIN: CPT | Performed by: INTERNAL MEDICINE

## 2017-04-12 RX ORDER — HYDROXYZINE HYDROCHLORIDE 25 MG/1
12.5 TABLET, FILM COATED ORAL 3 TIMES DAILY PRN
Qty: 45 TAB | Refills: 3 | Status: SHIPPED | OUTPATIENT
Start: 2017-04-12 | End: 2018-03-01

## 2017-04-12 RX ORDER — ACETAMINOPHEN 325 MG/1
650 TABLET ORAL EVERY 6 HOURS PRN
COMMUNITY
Start: 2017-04-12 | End: 2017-10-12 | Stop reason: SDUPTHER

## 2017-04-12 NOTE — MR AVS SNAPSHOT
"        Emmanuel Huerta   2017 1:20 PM   Office Visit   MRN: [id number]    Department:  [facility] Med - Internal Med   Dept Phone:  [phone]    Description:  Male : 1921   Provider:  Stacy Cm M.D.           Reason for Visit     Medication Management Discuss Hydroxyzine    Leg Problem Weaknees in Legs      Allergies as of 2017     No Known Allergies      You were diagnosed with     Squamous cell carcinoma of nose   [519975]       Anxiety   [971425]       Insomnia, unspecified type   [0693528]       Imbalance   [108522]       Arthralgia, unspecified joint   [1325219]         Vital Signs     Blood Pressure Pulse Temperature Height Weight Body Mass Index    146/66 mmHg 75 36.7 °C (98.1 °F) 1.549 m (5' 0.98\") 53.524 kg (118 lb) 22.31 kg/m2    Oxygen Saturation Smoking Status                93% Former Smoker          Basic Information     Date Of Birth Sex Race Ethnicity Preferred Language    1921 Male White Non- English      Your appointments     Aug 17, 2017  4:00 PM   Established Patient with Stacy Cm M.D.   [facility] Med - Internal Medicine (--)    [address] [place] [id number] [phone]           You will be receiving a confirmation call a few days before your appointment from our automated call confirmation system.              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   3/26/2014 - Present    Insomnia G47.00   2016 - Present    Short-term memory loss R41.3   2016 - Present    CKD " (chronic kidney disease) stage 4, GFR 15-29 ml/min (CMS-HCC) N18.4   7/7/2016 - Present    GERD without esophagitis K21.9   7/7/2016 - Present    Benign essential HTN I10   7/7/2016 - Present    Anemia of unknown etiology D64.9   7/7/2016 - Present    Lower urinary tract symptoms R39.9   7/7/2016 - Present    Anemia, unspecified D64.9   8/26/2016 - Present    Leukocytosis D72.829   8/26/2016 - Present    Ingrown toenail without infection L60.0   11/30/2016 - Present    Onychomycosis B35.1   11/30/2016 - Present    Hematoma of toe of left foot S90.122A   11/30/2016 - Present    Nonpalpable pulse Z78.9   11/30/2016 - Present    Toe pain, left M79.675   11/30/2016 - Present    Physician orders for life-sustaining treatment (POLST) form indicates patient wish for do-not-resuscitate status Z66   12/7/2016 - Present      Health Maintenance        Date Due Completion Dates    IMM ZOSTER VACCINE 11/30/1981 ---    IMM DTaP/Tdap/Td Vaccine (2 - Td) 4/24/2023 4/24/2013            Current Immunizations     13-VALENT PCV PREVNAR 9/22/2016    Influenza TIV (IM) 10/1/2014, 10/2/2013    Influenza Vaccine Adult HD 11/29/2016 12:05 PM    Pneumococcal polysaccharide vaccine (PPSV-23) 7/30/2014    Tdap Vaccine 4/24/2013      Below and/or attached are the medications your provider expects you to take. Review all of your home medications and newly ordered medications with your provider and/or pharmacist. Follow medication instructions as directed by your provider and/or pharmacist. Please keep your medication list with you and share with your provider. Update the information when medications are discontinued, doses are changed, or new medications (including over-the-counter products) are added; and carry medication information at all times in the event of emergency situations     Allergies:  No Known Allergies          Medications  Valid as of: April 12, 2017 -  2:28 PM    Generic Name Brand Name Tablet Size Instructions for use     Acetaminophen (Tab) TYLENOL 325 MG Take 2 Tabs by mouth every 6 hours as needed.        HydrOXYzine HCl (Tab) ATARAX 25 MG Take 0.5 Tabs by mouth 3 times a day as needed for Anxiety.        Melatonin (Cap) Melatonin 5 MG Take  by mouth every bedtime.        Omeprazole (CAPSULE DELAYED RELEASE) PRILOSEC 20 MG Take 1 Cap by mouth every day.        .                 Medicines prescribed today were sent to:     [facility]  [place], [place] - [address]    [address] [place] [address]    Phone: [phone] Fax: [phone]    Open 24 Hours?: No      Medication refill instructions:       If your prescription bottle indicates you have medication refills left, it is not necessary to call your provider’s office. Please contact your pharmacy and they will refill your medication.    If your prescription bottle indicates you do not have any refills left, you may request refills at any time through one of the following ways: The online [employer] system (except Urgent Care), by calling your provider’s office, or by asking your pharmacy to contact your provider’s office with a refill request. Medication refills are processed only during regular business hours and may not be available until the next business day. Your provider may request additional information or to have a follow-up visit with you prior to refilling your medication.   *Please Note: Medication refills are assigned a new Rx number when refilled electronically. Your pharmacy may indicate that no refills were authorized even though a new prescription for the same medication is available at the pharmacy. Please request the medicine by name with the pharmacy before contacting your provider for a refill.           [employer] Access Code: [email or web address]  Expires: 5/12/2017  1:11 PM    [employer]  A secure, online tool to manage your health information     [employer]® is a secure, online tool that connects you to your personalized health information from the  privacy of your home -- day or night - making it very easy for you to manage your healthcare. Once the activation process is completed, you can even access your medical information using the [employer] anay, which is available for free in the Apple Anay store or Google Play store.     [employer] provides the following levels of access (as shown below):   My Chart Features   [facility] Doctor [facility]  Specialists [facility]  Urgent  Care [facility]  Primary Care  Doctor   Email your healthcare team securely and privately 24/7 X X X    Manage appointments: schedule your next appointment; view details of past/upcoming appointments X      Request prescription refills. X      View recent personal medical records, including lab and immunizations X X X X   View health record, including health history, allergies, medications X X X X   Read reports about your outpatient visits, procedures, consult and ER notes X X X X   See your discharge summary, which is a recap of your hospital and/or ER visit that includes your diagnosis, lab results, and care plan. X X       How to register for [employer]:  1. Go to  [email or web address].  2. Click on the Sign Up Now box, which takes you to the New Member Sign Up page. You will need to provide the following information:  a. Enter your [employer] Access Code exactly as it appears at the top of this page. (You will not need to use this code after you’ve completed the sign-up process. If you do not sign up before the expiration date, you must request a new code.)   b. Enter your date of birth.   c. Enter your home email address.   d. Click Submit, and follow the next screen’s instructions.  3. Create a [employer] ID. This will be your [employer] login ID and cannot be changed, so think of one that is secure and easy to remember.  4. Create a [employer] password. You can change your password at any time.  5. Enter your Password Reset Question and Answer. This can be used at a later time if you forget  your password.   6. Enter your e-mail address. This allows you to receive e-mail notifications when new information is available in [employer].  7. Click Sign Up. You can now view your health information.    For assistance activating your [employer] account, call [phone]

## 2017-04-12 NOTE — PROGRESS NOTES
Follow-up     Chief Complaint   Patient presents with   • Medication Management     Discuss Hydroxyzine   • Leg Problem     Weaknees in Legs       HPI  History was obtained from the patient, merlin Roberson and a medical record review.   Doing better overall.   Got rads to face.  Wound better.  To f/u with derm and rad onc.   Anxiety better since treatment of skin Ca.   Will try to cut down on hydroxyzine.   Notes gait imbalance in last several months.    Occ can get dizzy when he stands.  More of a LH.   No CP, heart palp, SOB, HA, syncope.   Toe wound better.   Taking melatonin to help sleep.  Son reports that memory still an issue but better since his low in 2016.    Will forget recent things - forgot he went to a meal with a care partner.     THREE CHRONIC CONDITIONS  CKD, stble  H/o asthma, stable  LUTS, stable    Past Medical History   Diagnosis Date   • Kidney disease      with 2/2 hyperPTH   • Prostate CA (CMS-HCC)      PSA 18   • Rib fracture      personal history of fall on ice 2015   • Fracture of left femur (CMS-HCC)    • Lower urinary tract symptoms 7/7/2016   • History of asthma        Past Surgical History   Procedure Laterality Date   • Appendectomy     • Tonsillectomy     • Other orthopedic surgery       L femur repair   • Other       nasal growth removal x 2   • Cataract extraction with iol         Current Outpatient Prescriptions   Medication Sig Dispense Refill   • Melatonin 5 MG Cap Take  by mouth every bedtime.     • hydrOXYzine (ATARAX) 25 MG Tab Take 0.5 Tabs by mouth 3 times a day as needed for Anxiety. 45 Tab 3   • acetaminophen (TYLENOL) 325 MG Tab Take 2 Tabs by mouth every 6 hours as needed.     • omeprazole (PRILOSEC) 20 MG delayed-release capsule Take 1 Cap by mouth every day. 90 Cap 3     No current facility-administered medications for this visit.       Allergies as of 04/12/2017   • (No Known Allergies)       Social History     Social History   • Marital Status:      Spouse Name:  "N/A   • Number of Children: N/A   • Years of Education: N/A     Occupational History   • Not on file.     Social History Main Topics   • Smoking status: Former Smoker     Types: Cigarettes     Quit date: 1970   • Smokeless tobacco: Never Used      Comment: 1/2 ppd 40 years; stopped ; still chews   • Alcohol Use: 0.0 oz/week     0 Standard drinks or equivalent per week   • Drug Use: No   • Sexual Activity: No     Other Topics Concern   • Not on file     Social History Narrative    Lives in apt alone.      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    ROS:   No cough or SOB  No CP or heart palp  No F/C or WL  No abd pain, n/v     /66 mmHg  Pulse 75  Temp(Src) 36.7 °C (98.1 °F)  Ht 1.549 m (5' 0.98\")  Wt 53.524 kg (118 lb)  BMI 22.31 kg/m2  SpO2 93%    Physical Exam  General:  Alert and oriented.  No apparent distress.    Eyes: No scleral icterus. No conjunctival injection.      Ears:  [place]    ENMT: L lateral nostril with small scab.      Moist mucous membranes. Oropharynx clear. No erythema or exudates noted.     Neck: Supple. Trachea midline.    Resp: Clear to auscultation and percussion bilaterally. No rales, rhonchi, or wheezes.    Cardiovascular: Regular rate and normal rhythm. No murmurs, rubs or gallops. 2+ radial  pulses.     Abdomen: Soft, nontender, nondistended. Positive bowel sounds.     Musculoskeletal: No clubbing, cyanosis, edema.    Skin: See ENT above    Lymph:     Neuro: cn 2-12 intact  Good strength in U and L E  Intact sens  No dysmetria    Psych: Mood euthymic. Affect congruent.      Other:   Toes healed. Feet warm.   Thickened yellow toenails.      Labs/Studies  Hospital Outpatient Visit on 2017   Component Date Value Ref Range Status   • WBC 2017 12.5* 4.8 - 10.8 K/uL Final   • RBC 2017 4.28* 4.70 - 6.10 M/uL Final   • Hemoglobin 2017 14.0  14.0 - 18.0 " g/dL Final   • Hematocrit 04/05/2017 42.1  42.0 - 52.0 % Final   • MCV 04/05/2017 98.4* 81.4 - 97.8 fL Final   • MCH 04/05/2017 32.7  27.0 - 33.0 pg Final   • MCHC 04/05/2017 33.3* 33.7 - 35.3 g/dL Final   • RDW 04/05/2017 49.4  35.9 - 50.0 fL Final   • Platelet Count 04/05/2017 164  164 - 446 K/uL Final   • MPV 04/05/2017 8.8* 9.0 - 12.9 fL Final   • Nucleated RBC 04/05/2017 0.00   Final   • NRBC (Absolute) 04/05/2017 0.00   Final   • Neutrophils-Polys 04/05/2017 25.00* 44.00 - 72.00 % Final   • Lymphocytes 04/05/2017 72.40* 22.00 - 41.00 % Final   • Monocytes 04/05/2017 1.70  0.00 - 13.40 % Final   • Eosinophils 04/05/2017 0.90  0.00 - 6.90 % Final   • Basophils 04/05/2017 0.00  0.00 - 1.80 % Final   • Neutrophils (Absolute) 04/05/2017 3.13  1.82 - 7.42 K/uL Final    Includes immature neutrophils, if present.   • Lymphs (Absolute) 04/05/2017 9.05* 1.00 - 4.80 K/uL Final   • Monos (Absolute) 04/05/2017 0.21  0.00 - 0.85 K/uL Final   • Eos (Absolute) 04/05/2017 0.11  0.00 - 0.51 K/uL Final   • Baso (Absolute) 04/05/2017 0.00  0.00 - 0.12 K/uL Final   • Anisocytosis 04/05/2017 1+   Final   • Microcytosis 04/05/2017 1+   Final   • Sodium 04/05/2017 138  135 - 145 mmol/L Final   • Potassium 04/05/2017 4.3  3.6 - 5.5 mmol/L Final   • Chloride 04/05/2017 101  96 - 112 mmol/L Final   • Co2 04/05/2017 29  20 - 33 mmol/L Final   • Glucose 04/05/2017 88  65 - 99 mg/dL Final   • Bun 04/05/2017 31* 8 - 22 mg/dL Final   • Creatinine 04/05/2017 1.74* 0.50 - 1.40 mg/dL Final   • Calcium 04/05/2017 9.0  8.5 - 10.5 mg/dL Final   • Anion Gap 04/05/2017 8.0  0.0 - 11.9 Final   • Prostatic Specific Antigen Tot 04/05/2017 43.23* 0.00 - 4.00 ng/mL Final    Comment: The Access Full[employer]britech PSA assay is a paramagnetic particle,  chemiluminescent immunoassay for the quantitative determination  of total prostate specific antigen (PSA) levels using the  [employer] Immunoassay System. Values obtained with different  methods cannot be used  interchangeably for patient monitoring.     • Manual Diff Status 04/05/2017 PERFORMED   Final   • Peripheral Smear Review 04/05/2017 see below   Final    Comment: Due to instrument suspect flags, further review of peripheral smear is  indicated on this patient sample. This review may or may not result in  abnormal findings.     • Plt Estimation 04/05/2017 Normal   Final   • RBC Morphology 04/05/2017 Present   Final   • Poikilocytosis 04/05/2017 1+   Final   • Ovalocytes 04/05/2017 1+   Final   • Reactive Lymphocytes 04/05/2017 Few   Final   • GFR If  04/05/2017 44* >60 mL/min/1.73 m 2 Final   • GFR If Non  04/05/2017 37* >60 mL/min/1.73 m 2 Final     Assessment and Plan  Squamous cell carcinoma of nose  S/p rads with marked improvement clinically   To f/u with rad onc and derm in coming weeks    Anxiety  Off Xanax and Ativan  Better since rads tx  Cont the following for now  - hydrOXYzine (ATARAX) 25 MG Tab; Take 0.5 Tabs by mouth 3 times a day as needed for Anxiety.  Dispense: 45 Tab; Refill: 3  Told him to try to minimize use given s/e profile including but not limited to constipation, u retention, falls, and confusion  Step son monitoring   Tolerating hydroxyzine without issue  Discussed possibly starting SSRI to help with anxiety in past - wants to hold off at this time as seems to be doing ok    Insomnia, unspecified type  Sleeping well with melatonin    Imbalance  Light headedness  Seems to be having 2 processes at least going on  1. The imbalance is related to decreased muscle strength/frailty and possibly meds  2. Occ he will get LH which will make imbalance worse  EKG done today was NSR with no arrythmia  Orthostatics were negative but there could still be a component as he gets LH with standing  Defer further work up as burden of care too great given GOC    Arthralgia, unspecified joint  rec Tylenol renally dosed over NSAIDs  - acetaminophen (TYLENOL) 325 MG Tab; Take 2 Tabs  by mouth every 6 hours as needed.    Cognitive impairment  In the past tsh, b12 and folate have been fine  Per step son, clinically doing better so meds that he has stopped could have been contributing (BZDs); also, treatment of stressor (skin cancer) could also be helping  Even though he is improved, he still has some cogn impairment  Could have an early dementia given he has some functional impairment  Declines further work up (e.g., brain MRI to r/o stroke, mass) given burden of care and [facility]  Supportive care for now  Lives in [address]  CG takes him places  Not driving  Step son helping manage meds and finances  Consider referral to [facility] and [facility] at later date.    Chronic kidney disease, stage IV (severe) (HCC)   Stable   Monitor   Avoid nephrotoxic agents  Renally dose medications     CLL (chronic lymphocytic leukemia) (HCC)  Anemia - iron studies not sent but could be ACD?    Presume CLLD given smudge cells  Anemia now better  Declines further eval aware of risk - wants to focus on QOL    Elevated PSA  With LUTS  Pt declines eval for prostate cancer  Reports LUTS are tolerable and doesn't want to try other meds - failed tamsulosin 2/2 GI upset    History of hypertension  At goal off meds      Toe pain, left  Hematoma of toe of left foot, initial encounter  Onychomycosis   Ingrown toenail without infection  Pain and wound better  Reportedly seen by pods and told ABIs fine    Gastroesophageal reflux disease, esophagitis presence not specified  Stable on PPI     Health maintenance   See immunizations   Got Prevnar and Pneumovax  TDAP UTD  Could get shingles shot  Flu shot 2016  Consider DEXA given fall and fracture - declines  Defer Ca screenings given age and comorbidities  Pt is DNR/DNI - limited med intervention ok.      Patient Instructions   Bring in POLST.   Let me know if you want to do PT.    Minimize hydroxizine.       I spent over 40 minutes in face-to-face time with this patient of which > 50% was  devoted to counseling.  Topics included BP, anxiety, memory, skin growth, CKD, foot issues.    Follow-up  Return in about 3 months (around 7/12/2017).    Signed by: Stacy Cm M.D.

## 2017-04-13 PROBLEM — R42 LIGHT HEADEDNESS: Status: ACTIVE | Noted: 2017-04-13

## 2017-04-13 PROBLEM — M25.50 ARTHRALGIA: Status: ACTIVE | Noted: 2017-04-13

## 2017-04-13 PROBLEM — R26.89 IMBALANCE: Status: ACTIVE | Noted: 2017-04-13

## 2017-04-13 PROBLEM — R41.89 COGNITIVE IMPAIRMENT: Status: ACTIVE | Noted: 2017-04-13

## 2017-05-04 ENCOUNTER — HOSPITAL ENCOUNTER (OUTPATIENT)
Dept: LAB | Facility: MEDICAL CENTER | Age: 82
End: 2017-05-04
Attending: NURSE PRACTITIONER
Payer: MEDICARE

## 2017-05-04 LAB
25(OH)D3 SERPL-MCNC: 38 NG/ML (ref 30–100)
ALBUMIN SERPL BCP-MCNC: 4.3 G/DL (ref 3.2–4.9)
APPEARANCE UR: CLEAR
BACTERIA #/AREA URNS HPF: ABNORMAL /HPF
BASOPHILS # BLD AUTO: 0 % (ref 0–1.8)
BASOPHILS # BLD: 0 K/UL (ref 0–0.12)
BILIRUB UR QL STRIP.AUTO: NEGATIVE
BUN SERPL-MCNC: 30 MG/DL (ref 8–22)
CALCIUM SERPL-MCNC: 9.4 MG/DL (ref 8.5–10.5)
CHLORIDE SERPL-SCNC: 99 MMOL/L (ref 96–112)
CO2 SERPL-SCNC: 28 MMOL/L (ref 20–33)
COLOR UR: ABNORMAL
CREAT SERPL-MCNC: 1.93 MG/DL (ref 0.5–1.4)
CREAT UR-MCNC: 54.9 MG/DL
EOSINOPHIL # BLD AUTO: 0.22 K/UL (ref 0–0.51)
EOSINOPHIL NFR BLD: 1.7 % (ref 0–6.9)
ERYTHROCYTE [DISTWIDTH] IN BLOOD BY AUTOMATED COUNT: 53.1 FL (ref 35.9–50)
GFR SERPL CREATININE-BSD FRML MDRD: 32 ML/MIN/1.73 M 2
GLUCOSE SERPL-MCNC: 119 MG/DL (ref 65–99)
GLUCOSE UR STRIP.AUTO-MCNC: NEGATIVE MG/DL
HCT VFR BLD AUTO: 43.2 % (ref 42–52)
HGB BLD-MCNC: 14.2 G/DL (ref 14–18)
KETONES UR STRIP.AUTO-MCNC: NEGATIVE MG/DL
LEUKOCYTE ESTERASE UR QL STRIP.AUTO: NEGATIVE
LYMPHOCYTES # BLD AUTO: 8.38 K/UL (ref 1–4.8)
LYMPHOCYTES NFR BLD: 65.5 % (ref 22–41)
MANUAL DIFF BLD: NORMAL
MCH RBC QN AUTO: 33.3 PG (ref 27–33)
MCHC RBC AUTO-ENTMCNC: 32.9 G/DL (ref 33.7–35.3)
MCV RBC AUTO: 101.4 FL (ref 81.4–97.8)
MICRO URNS: ABNORMAL
MONOCYTES # BLD AUTO: 0 K/UL (ref 0–0.85)
MONOCYTES NFR BLD AUTO: 0 % (ref 0–13.4)
MORPHOLOGY BLD-IMP: NORMAL
NEUTROPHILS # BLD AUTO: 4.2 K/UL (ref 1.82–7.42)
NEUTROPHILS NFR BLD: 32.8 % (ref 44–72)
NITRITE UR QL STRIP.AUTO: NEGATIVE
NRBC # BLD AUTO: 0 K/UL
NRBC BLD AUTO-RTO: 0 /100 WBC
PH UR STRIP.AUTO: 6.5 [PH]
PHOSPHATE SERPL-MCNC: 3.6 MG/DL (ref 2.5–4.5)
PLATELET # BLD AUTO: 167 K/UL (ref 164–446)
PLATELET BLD QL SMEAR: NORMAL
PMV BLD AUTO: 9.2 FL (ref 9–12.9)
POTASSIUM SERPL-SCNC: 4.5 MMOL/L (ref 3.6–5.5)
PROT UR QL STRIP: 50 MG/DL
PROT UR-MCNC: 47 MG/DL (ref 0–15)
PROT/CREAT UR: 856 MG/G (ref 15–68)
PTH-INTACT SERPL-MCNC: 64.5 PG/ML (ref 14–72)
RBC # BLD AUTO: 4.26 M/UL (ref 4.7–6.1)
RBC BLD AUTO: PRESENT
RBC UR QL AUTO: NEGATIVE
SMUDGE CELLS BLD QL SMEAR: NORMAL
SODIUM SERPL-SCNC: 134 MMOL/L (ref 135–145)
SP GR UR STRIP.AUTO: 1.01
WBC # BLD AUTO: 12.8 K/UL (ref 4.8–10.8)

## 2017-05-04 PROCEDURE — 82306 VITAMIN D 25 HYDROXY: CPT

## 2017-05-04 PROCEDURE — 81001 URINALYSIS AUTO W/SCOPE: CPT

## 2017-05-04 PROCEDURE — 80069 RENAL FUNCTION PANEL: CPT

## 2017-05-04 PROCEDURE — 85027 COMPLETE CBC AUTOMATED: CPT

## 2017-05-04 PROCEDURE — 85007 BL SMEAR W/DIFF WBC COUNT: CPT

## 2017-05-04 PROCEDURE — 36415 COLL VENOUS BLD VENIPUNCTURE: CPT

## 2017-05-04 PROCEDURE — 84156 ASSAY OF PROTEIN URINE: CPT

## 2017-05-04 PROCEDURE — 83970 ASSAY OF PARATHORMONE: CPT

## 2017-05-04 PROCEDURE — 82570 ASSAY OF URINE CREATININE: CPT

## 2017-05-09 ENCOUNTER — HOSPITAL ENCOUNTER (OUTPATIENT)
Dept: RADIATION ONCOLOGY | Facility: MEDICAL CENTER | Age: 82
End: 2017-05-31
Attending: RADIOLOGY
Payer: MEDICARE

## 2017-05-09 PROCEDURE — 99212 OFFICE O/P EST SF 10 MIN: CPT | Performed by: RADIOLOGY

## 2017-05-10 NOTE — PROCEDURES
DATE OF SERVICE:  05/09/2017    DATE OF SERVICE:  05/09/2017    DIAGNOSES:  Stage II (T2 N0 M0), recurrent basal cell carcinoma of the left   nasal ala, status post Erivedge followed by external beam radiotherapy to 5750   cGy in 23 fractions, completing in February 2017.    INTERVAL HISTORY:  I had the pleasure of seeing the patient today in followup   for his skin cancer.  From a healing perspective, the patient states he feels   he has healed remarkably well.  He is both happy with the functional and   cosmetic results from his treatment.  He does not have any new lesions of   concern to him.  Therefore, we will release him from active followup as he   continues on skin surveys through dermatology.  Should he have any questions   or concerns; however, in the future, he should feel free to contact me.       ____________________________________     Urbano Gleason MD    [facility] / [facility]    DD:  05/09/2017 11:50:43  DT:  05/10/2017 05:59:12    D#:  [id number]  Job#:  [id number]    cc: ALLY GIBSON MD, Dr. Faviola Lund

## 2017-05-15 ENCOUNTER — APPOINTMENT (OUTPATIENT)
Dept: RADIOLOGY | Facility: IMAGING CENTER | Age: 82
End: 2017-05-15
Attending: PHYSICIAN ASSISTANT
Payer: MEDICARE

## 2017-05-15 ENCOUNTER — OFFICE VISIT (OUTPATIENT)
Dept: URGENT CARE | Facility: CLINIC | Age: 82
End: 2017-05-15
Payer: MEDICARE

## 2017-05-15 VITALS
HEIGHT: 62 IN | TEMPERATURE: 98.5 F | SYSTOLIC BLOOD PRESSURE: 140 MMHG | BODY MASS INDEX: 22.19 KG/M2 | OXYGEN SATURATION: 95 % | WEIGHT: 120.6 LBS | HEART RATE: 67 BPM | DIASTOLIC BLOOD PRESSURE: 84 MMHG
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
## 2017-05-15 DIAGNOSIS — S22.42XA CLOSED FRACTURE OF MULTIPLE RIBS OF LEFT SIDE, INITIAL ENCOUNTER: ICD-10-CM

## 2017-05-15 DIAGNOSIS — R07.81 RIB PAIN ON LEFT SIDE: ICD-10-CM

## 2017-05-15 DIAGNOSIS — R42 DIZZINESS: ICD-10-CM

## 2017-05-15 PROCEDURE — 4040F PNEUMOC VAC/ADMIN/RCVD: CPT | Performed by: PHYSICIAN ASSISTANT

## 2017-05-15 PROCEDURE — 99214 OFFICE O/P EST MOD 30 MIN: CPT | Mod: 25 | Performed by: PHYSICIAN ASSISTANT

## 2017-05-15 PROCEDURE — 71101 X-RAY EXAM UNILAT RIBS/CHEST: CPT | Mod: TC,LT | Performed by: PHYSICIAN ASSISTANT

## 2017-05-15 PROCEDURE — 0518F FALL PLAN OF CARE DOCD: CPT | Mod: 8P | Performed by: PHYSICIAN ASSISTANT

## 2017-05-15 PROCEDURE — G8420 CALC BMI NORM PARAMETERS: HCPCS | Performed by: PHYSICIAN ASSISTANT

## 2017-05-15 PROCEDURE — 1100F PTFALLS ASSESS-DOCD GE2>/YR: CPT | Performed by: PHYSICIAN ASSISTANT

## 2017-05-15 PROCEDURE — 1036F TOBACCO NON-USER: CPT | Performed by: PHYSICIAN ASSISTANT

## 2017-05-15 PROCEDURE — 3288F FALL RISK ASSESSMENT DOCD: CPT | Performed by: PHYSICIAN ASSISTANT

## 2017-05-15 ASSESSMENT — ENCOUNTER SYMPTOMS
DIZZINESS: 1
LOSS OF CONSCIOUSNESS: 0
BRUISES/BLEEDS EASILY: 0
BLURRED VISION: 0
FEVER: 0
FALLS: 1
PALPITATIONS: 0
SPEECH CHANGE: 0
SEIZURES: 0
SHORTNESS OF BREATH: 0
CHILLS: 0
VOMITING: 0
WHEEZING: 0
TINGLING: 0
TREMORS: 0
SPUTUM PRODUCTION: 0
HEADACHES: 0
COUGH: 1
FOCAL WEAKNESS: 0
NAUSEA: 0

## 2017-05-15 NOTE — PROGRESS NOTES
Subjective:      Emmanuel Huerta is a 89 y.o. male who presents with Dizziness            Dizziness  Associated symptoms include coughing. Pertinent negatives include no chest pain ( pos for lateral chest rib pain, focal s/p fall), chills, fever, headaches, nausea or vomiting.   Pt notes had momentary episode of dizziness this am, notes has completely resolved, notes did walk to and from breakfast on own w/ no issues, did then call caregiver to inform had then resolved dizziness episode, of note pt also reports having had a fall in nursing home 2d ago, he denies impact to head or LOC but c/o leftsided sharp rib pain w/ deep breaths, coughing or laughing since fall. He denies any pain to anterior chest. Denies PMH of CAD or cardiac issues, reports in good cardiac health. Chart shows ECG one month ago w/ PCP showing NSR rate in 60's . Denies weakness numbness or tingling to UE/LE. He denies PMH of CVA / TIA. Denies change in vision. Pt is appropriate, joking and moving well around exam room w/ evident pain to left lower chest wall w/ laughing. He notes very mild chronic cough but denies feeling particularly sick. Denies fevr/chills/nausea/voimting.     Review of Systems   Constitutional: Negative for fever and chills.   HENT: Negative for tinnitus.    Eyes: Negative for blurred vision.   Respiratory: Positive for cough. Negative for sputum production, shortness of breath and wheezing.    Cardiovascular: Negative for chest pain ( pos for lateral chest rib pain, focal s/p fall), palpitations and leg swelling.   Gastrointestinal: Negative for nausea and vomiting.   Musculoskeletal: Positive for falls (2d prior). Negative for joint pain.   Neurological: Positive for dizziness ( single episode earlier today, PMH of chronic). Negative for tingling, tremors, speech change, focal weakness, seizures, loss of consciousness and headaches.   Endo/Heme/Allergies: Does not bruise/bleed easily.     PMH:  has a past medical  "history of Kidney disease; Prostate CA (CMS-HCC); Rib fracture; Fracture of left femur (CMS-[facility]); Lower urinary tract symptoms (7/7/2016); History of asthma; Skin cancer; Cognitive impairment; and GERD (gastroesophageal reflux disease).  MEDS:   Current outpatient prescriptions:   •  Melatonin 5 MG Cap, Take  by mouth every bedtime., Disp: , Rfl:   •  omeprazole (PRILOSEC) 20 MG delayed-release capsule, Take 1 Cap by mouth every day., Disp: 90 Cap, Rfl: 3  •  hydrOXYzine (ATARAX) 25 MG Tab, Take 0.5 Tabs by mouth 3 times a day as needed for Anxiety., Disp: 45 Tab, Rfl: 3  •  acetaminophen (TYLENOL) 325 MG Tab, Take 2 Tabs by mouth every 6 hours as needed., Disp: , Rfl:   ALLERGIES: No Known Allergies  SURGHX:   Past Surgical History   Procedure Laterality Date   • Appendectomy     • Tonsillectomy     • Other orthopedic surgery       L femur repair   • Other       nasal growth removal x 2   • Cataract extraction with iol       SOCHX:  reports that he quit smoking about 46 years ago. His smoking use included Cigarettes. He has never used smokeless tobacco. He reports that he drinks alcohol. He reports that he does not use illicit drugs.  FH: Family history was reviewed, no pertinent findings to report    I have worn a mask for the entire encounter with this patient.        Objective:     /84 mmHg  Pulse 67  Temp(Src) 36.9 °C (98.5 °F)  Ht 1.575 m (5' 2\")  Wt 54.704 kg (120 lb 9.6 oz)  BMI 22.05 kg/m2  SpO2 95%     Physical Exam   Constitutional: He is oriented to person, place, and time. He appears well-developed and well-nourished. No distress.   HENT:   Head: Normocephalic and atraumatic.   Right Ear: Tympanic membrane, external ear and ear canal normal.   Left Ear: Tympanic membrane, external ear and ear canal normal.   Nose: Nose normal.   Mouth/Throat: Uvula is midline, oropharynx is clear and moist and mucous membranes are normal. No oropharyngeal exudate, posterior oropharyngeal edema, posterior " oropharyngeal erythema or tonsillar abscesses.   Eyes: Conjunctivae are normal. Right eye exhibits no discharge. Left eye exhibits no discharge. No scleral icterus.   Neck: Neck supple.   Cardiovascular: Normal rate, regular rhythm, normal heart sounds and intact distal pulses.   No extrasystoles are present.   Pulmonary/Chest: Effort normal and breath sounds normal. No respiratory distress. He has no decreased breath sounds. He has no wheezes. He has no rhonchi. He has no rales. He exhibits tenderness ( left lateral ).   Abdominal: Soft. Bowel sounds are normal. He exhibits no distension. There is no tenderness. There is no rebound and no guarding.   Musculoskeletal: Normal range of motion.        Left forearm: He exhibits swelling ( trace diffuse edema). He exhibits no laceration.   Left FA and upper arm w/ ecchymosis, some firm induration, full AROM w/o pain to elbow and shoulder (overhead not assessed)   Lymphadenopathy:     He has cervical adenopathy ( mild bilat).   Neurological: He is alert and oriented to person, place, and time. He has normal strength. He is not disoriented. No cranial nerve deficit or sensory deficit. He exhibits normal muscle tone. Coordination and gait ( slow and steady) normal. GCS eye subscore is 4. GCS verbal subscore is 5. GCS motor subscore is 6.   Skin: Skin is warm and dry. He is not diaphoretic. No pallor.   Psychiatric: He has a normal mood and affect.   Nursing note and vitals reviewed.         Dx rib series -      5/15/2017 12:55 PM    HISTORY/REASON FOR EXAM:  Pain Following Trauma.      TECHNIQUE/EXAM DESCRIPTION AND NUMBER OF VIEWS:  3 images of the left ribs and chest.    COMPARISON: NONE    FINDINGS:  There is acute-appearing deformity of the anterior aspect of LEFT rib 6, 7 and 8. No other acute fractures or acute bony changes are noted. There is curvilinear deformity of LEFT ribs 3, 4, 5 and 6. There is no evidence of a hemo or pneumothorax.         Impression        1.   Acute fractures of LEFT ribs 6, 7 and 8  2.  Multiple remote LEFT rib fractures         Reading Provider Reading Date     Migue He M.D. May 15, 2017         Signing Provider Signing Date Signing Time     Migue He M.D. May 15, 2017  1:06 PM         We discuss ECG - w/ no PMH, no cardiac complaints, normal ECG last month and normal auscultation of heart today - I will hold off on ECG evaluation      Assessment/Plan:     1. Closed fracture of multiple ribs of left side, initial encounter  Supportive care is reviewed with patient/caregiver - recommend to focus on deep breathing exercises to decrease risk of lower respiratory infection, tylenol rather than any NSAIDS for pain, ice, trend resolution, we discuss safe mobility w/in care facility w/ chronic dizziness  Return to clinic with lack of resolution or progression of symptoms.  ER precautions with any worsening symptoms are reviewed with patient/caregiver and they do express understanding  F/u w/ PCP    - [id number] (WITH 1-VIEW CXR) LEFT; Future    2. Fall at nursing home, initial encounter    - [id number] (WITH 1-VIEW CXR) LEFT; Future    3. Dizziness

## 2017-05-15 NOTE — MR AVS SNAPSHOT
"        Emmanuel Huerta   5/15/2017 12:00 PM   Office Visit   MRN: [id number]    Department:  [facility]   Dept Phone:  [phone]    Description:  Male : 1921   Provider:  Jl Chen PA-C           Reason for Visit     Dizziness high blood presure,pt states he has sharp pain by ribs/chest x2days ago       Allergies as of 5/15/2017     No Known Allergies      You were diagnosed with     Closed fracture of multiple ribs of left side, initial encounter   [[id number]]       Fall at nursing home, initial encounter   [[id number]]       Dizziness   [2015]         Vital Signs     Blood Pressure Pulse Temperature Height Weight Body Mass Index    140/84 mmHg 67 36.9 °C (98.5 °F) 1.575 m (5' 2\") 54.704 kg (120 lb 9.6 oz) 22.05 kg/m2    Oxygen Saturation Smoking Status                95% Former Smoker          Basic Information     Date Of Birth Sex Race Ethnicity Preferred Language    1921 Male White Non- English      Your appointments     Aug 17, 2017  4:00 PM   Established Patient with Stacy Cm M.D.   [facility] Med - Internal Medicine (--)    [address] [place] [address]  [address] [place] [id number] [phone]           You will be receiving a confirmation call a few days before your appointment from our automated call confirmation system.              Problem List              ICD-10-CM Priority Class Noted - Resolved    GERD (gastroesophageal reflux disease) (Chronic) K21.9   2013 - Present    HTN (hypertension) (Chronic) I10   2013 - Present    Chronic kidney disease, stage 4, severely decreased GFR (CMS-HCC) N18.4   2013 - Present    Hyperlipemia E78.5   2013 - Present    Anxiety F41.9   10/2/2013 - Present    Prostate cancer (CMS-[facility]) C61   10/7/2013 - Present    Squamous cell carcinoma of nose (Chronic) C44.321   3/26/2014 - Present    Insomnia G47.00   2016 - Present    Short-term memory loss R41.3   2016 - Present    CKD (chronic " kidney disease) stage 4, GFR 15-29 ml/min (CMS-[facility]) N18.4   7/7/2016 - Present    GERD without esophagitis K21.9   7/7/2016 - Present    Benign essential HTN I10   7/7/2016 - Present    Anemia of unknown etiology D64.9   7/7/2016 - Present    Lower urinary tract symptoms R39.9   7/7/2016 - Present    Anemia, unspecified D64.9   8/26/2016 - Present    Leukocytosis D72.829   8/26/2016 - Present    Ingrown toenail without infection L60.0   11/30/2016 - Present    Onychomycosis B35.1   11/30/2016 - Present    Hematoma of toe of left foot S90.122A   11/30/2016 - Present    Nonpalpable pulse Z78.9   11/30/2016 - Present    Toe pain, left M79.675   11/30/2016 - Present    Physician orders for life-sustaining treatment (POLST) form indicates patient wish for do-not-resuscitate status Z66   12/7/2016 - Present    Cognitive impairment R41.89   4/13/2017 - Present    Arthralgia M25.50   4/13/2017 - Present    Light headedness R42   4/13/2017 - Present    Imbalance R26.89   4/13/2017 - Present      Health Maintenance        Date Due Completion Dates    IMM ZOSTER VACCINE 11/30/1981 ---    IMM DTaP/Tdap/Td Vaccine (2 - Td) 4/24/2023 4/24/2013            Current Immunizations     13-VALENT PCV PREVNAR 9/22/2016    Influenza TIV (IM) 10/1/2014, 10/2/2013    Influenza Vaccine Adult HD 11/29/2016 12:05 PM    Pneumococcal polysaccharide vaccine (PPSV-23) 7/30/2014    Tdap Vaccine 4/24/2013      Below and/or attached are the medications your provider expects you to take. Review all of your home medications and newly ordered medications with your provider and/or pharmacist. Follow medication instructions as directed by your provider and/or pharmacist. Please keep your medication list with you and share with your provider. Update the information when medications are discontinued, doses are changed, or new medications (including over-the-counter products) are added; and carry medication information at all times in the event of emergency  situations     Allergies:  No Known Allergies          Medications  Valid as of: May 15, 2017 -  1:28 PM    Generic Name Brand Name Tablet Size Instructions for use    Acetaminophen (Tab) TYLENOL 325 MG Take 2 Tabs by mouth every 6 hours as needed.        HydrOXYzine HCl (Tab) ATARAX 25 MG Take 0.5 Tabs by mouth 3 times a day as needed for Anxiety.        Melatonin (Cap) Melatonin 5 MG Take  by mouth every bedtime.        Omeprazole (CAPSULE DELAYED RELEASE) PRILOSEC 20 MG Take 1 Cap by mouth every day.        .                 Medicines prescribed today were sent to:     [facility] - [place], [place] - [address]    [address] [place] [address]    Phone: [phone] Fax: [phone]    Open 24 Hours?: No      Medication refill instructions:       If your prescription bottle indicates you have medication refills left, it is not necessary to call your provider’s office. Please contact your pharmacy and they will refill your medication.    If your prescription bottle indicates you do not have any refills left, you may request refills at any time through one of the following ways: The online [employer] system (except Urgent Care), by calling your provider’s office, or by asking your pharmacy to contact your provider’s office with a refill request. Medication refills are processed only during regular business hours and may not be available until the next business day. Your provider may request additional information or to have a follow-up visit with you prior to refilling your medication.   *Please Note: Medication refills are assigned a new Rx number when refilled electronically. Your pharmacy may indicate that no refills were authorized even though a new prescription for the same medication is available at the pharmacy. Please request the medicine by name with the pharmacy before contacting your provider for a refill.        Your To Do List     Future Labs/Procedures Complete By Expires    [id number] (WITH  1-VIEW CXR) LEFT  As directed 5/15/2018         [employer] Access Code: Activation code not generated  Current [employer] Status: Active

## 2017-10-03 ENCOUNTER — TELEPHONE (OUTPATIENT)
Dept: INTERNAL MEDICINE | Facility: MEDICAL CENTER | Age: 82
End: 2017-10-03

## 2017-10-03 DIAGNOSIS — R41.89 COGNITIVE IMPAIRMENT: ICD-10-CM

## 2017-10-03 NOTE — TELEPHONE ENCOUNTER
Those labs look comprehensive enough to me.  Only other things I ordered were TSH, B12 and folate.  Orders placed right now.   Lab can combine both orders.    Have the lab send both orders to me and renal.

## 2017-10-03 NOTE — TELEPHONE ENCOUNTER
1. Caller Name: Da                      Call Back Number: [phone]      2. Message: Da called and l/m. Needing lab order prior to pt's appt with you on 10/26/17. Pt has been going to [facility] and they been testing pt and pt has been stable. Wanting to see if you can order Calcium, Blood count, ferrous, and cmp, which they are testing so they won't have 2 lab orders. Please advise    3. Patient approves office to leave a detailed voicemail/MyChart message: N\A

## 2017-10-03 NOTE — TELEPHONE ENCOUNTER
Called Da left detailed message regarding order was placed and if he could ask the lab to CC both lab results to her asked to call back if he had questions

## 2017-10-09 ENCOUNTER — APPOINTMENT (OUTPATIENT)
Dept: RADIOLOGY | Facility: MEDICAL CENTER | Age: 82
End: 2017-10-09
Attending: EMERGENCY MEDICINE
Payer: MEDICARE

## 2017-10-09 ENCOUNTER — HOSPITAL ENCOUNTER (EMERGENCY)
Facility: MEDICAL CENTER | Age: 82
End: 2017-10-09
Attending: EMERGENCY MEDICINE
Payer: MEDICARE

## 2017-10-09 VITALS
HEIGHT: 63 IN | HEART RATE: 76 BPM | RESPIRATION RATE: 16 BRPM | OXYGEN SATURATION: 98 % | TEMPERATURE: 98.6 F | WEIGHT: 114 LBS | DIASTOLIC BLOOD PRESSURE: 74 MMHG | SYSTOLIC BLOOD PRESSURE: 130 MMHG | BODY MASS INDEX: 20.2 KG/M2

## 2017-10-09 DIAGNOSIS — Z53.29 LEFT AGAINST MEDICAL ADVICE: ICD-10-CM

## 2017-10-09 DIAGNOSIS — F41.8 SITUATIONAL ANXIETY: ICD-10-CM

## 2017-10-09 DIAGNOSIS — G45.9 TRANSIENT CEREBRAL ISCHEMIA, UNSPECIFIED TYPE: ICD-10-CM

## 2017-10-09 LAB
ALBUMIN SERPL BCP-MCNC: 3.7 G/DL (ref 3.2–4.9)
ALBUMIN/GLOB SERPL: 1.8 G/DL
ALP SERPL-CCNC: 83 U/L (ref 30–99)
ALT SERPL-CCNC: 13 U/L (ref 2–50)
ANION GAP SERPL CALC-SCNC: 9 MMOL/L (ref 0–11.9)
AST SERPL-CCNC: 16 U/L (ref 12–45)
BASOPHILS # BLD AUTO: 0.3 % (ref 0–1.8)
BASOPHILS # BLD: 0.03 K/UL (ref 0–0.12)
BILIRUB SERPL-MCNC: 0.6 MG/DL (ref 0.1–1.5)
BUN SERPL-MCNC: 30 MG/DL (ref 8–22)
CALCIUM SERPL-MCNC: 8.6 MG/DL (ref 8.5–10.5)
CHLORIDE SERPL-SCNC: 104 MMOL/L (ref 96–112)
CO2 SERPL-SCNC: 22 MMOL/L (ref 20–33)
CREAT SERPL-MCNC: 1.59 MG/DL (ref 0.5–1.4)
EOSINOPHIL # BLD AUTO: 0.08 K/UL (ref 0–0.51)
EOSINOPHIL NFR BLD: 0.8 % (ref 0–6.9)
ERYTHROCYTE [DISTWIDTH] IN BLOOD BY AUTOMATED COUNT: 47 FL (ref 35.9–50)
GFR SERPL CREATININE-BSD FRML MDRD: 41 ML/MIN/1.73 M 2
GLOBULIN SER CALC-MCNC: 2.1 G/DL (ref 1.9–3.5)
GLUCOSE SERPL-MCNC: 104 MG/DL (ref 65–99)
HCT VFR BLD AUTO: 36.8 % (ref 42–52)
HGB BLD-MCNC: 12.8 G/DL (ref 14–18)
IMM GRANULOCYTES # BLD AUTO: 0.03 K/UL (ref 0–0.11)
IMM GRANULOCYTES NFR BLD AUTO: 0.3 % (ref 0–0.9)
LYMPHOCYTES # BLD AUTO: 3.99 K/UL (ref 1–4.8)
LYMPHOCYTES NFR BLD: 42.2 % (ref 22–41)
MCH RBC QN AUTO: 33.9 PG (ref 27–33)
MCHC RBC AUTO-ENTMCNC: 34.8 G/DL (ref 33.7–35.3)
MCV RBC AUTO: 97.4 FL (ref 81.4–97.8)
MONOCYTES # BLD AUTO: 0.45 K/UL (ref 0–0.85)
MONOCYTES NFR BLD AUTO: 4.8 % (ref 0–13.4)
NEUTROPHILS # BLD AUTO: 4.87 K/UL (ref 1.82–7.42)
NEUTROPHILS NFR BLD: 51.6 % (ref 44–72)
NRBC # BLD AUTO: 0 K/UL
NRBC BLD AUTO-RTO: 0 /100 WBC
PLATELET # BLD AUTO: 145 K/UL (ref 164–446)
PMV BLD AUTO: 8.9 FL (ref 9–12.9)
POTASSIUM SERPL-SCNC: 4.1 MMOL/L (ref 3.6–5.5)
PROT SERPL-MCNC: 5.8 G/DL (ref 6–8.2)
RBC # BLD AUTO: 3.78 M/UL (ref 4.7–6.1)
SODIUM SERPL-SCNC: 135 MMOL/L (ref 135–145)
TROPONIN I SERPL-MCNC: 0.01 NG/ML (ref 0–0.04)
WBC # BLD AUTO: 9.5 K/UL (ref 4.8–10.8)

## 2017-10-09 PROCEDURE — 99284 EMERGENCY DEPT VISIT MOD MDM: CPT

## 2017-10-09 PROCEDURE — 85025 COMPLETE CBC W/AUTO DIFF WBC: CPT

## 2017-10-09 PROCEDURE — 80053 COMPREHEN METABOLIC PANEL: CPT

## 2017-10-09 PROCEDURE — 84484 ASSAY OF TROPONIN QUANT: CPT

## 2017-10-09 ASSESSMENT — PAIN SCALES - GENERAL: PAINLEVEL_OUTOF10: 0

## 2017-10-09 NOTE — DISCHARGE INSTRUCTIONS
"Transient Ischemic Attack  A transient ischemic attack (TIA) is a \"warning stroke\" that causes stroke-like symptoms. Unlike a stroke, a TIA does not cause permanent damage to the brain. The symptoms of a TIA can happen very fast and do not last long. It is important to know the symptoms of a TIA and what to do. This can help prevent a major stroke or death.  CAUSES   A TIA is caused by a temporary blockage in an artery in the brain or neck (carotid artery). The blockage does not allow the brain to get the blood supply it needs and can cause different symptoms. The blockage can be caused by either:  · A blood clot.  · Fatty buildup (plaque) in a neck or brain artery.  RISK FACTORS  · High blood pressure (hypertension).  · High cholesterol.  · Diabetes mellitus.  · Heart disease.  · The buildup of plaque in the blood vessels (peripheral artery disease or atherosclerosis).  · The buildup of plaque in the blood vessels that provide blood and oxygen to the brain (carotid artery stenosis).  · An abnormal heart rhythm (atrial fibrillation).  · Obesity.  · Using any tobacco products, including cigarettes, chewing tobacco, or electronic cigarettes.  · Taking oral contraceptives, especially in combination with using tobacco.  · Physical inactivity.  · A diet high in fats, salt (sodium), and calories.  · Excessive alcohol use.  · Use of illegal drugs (especially cocaine and methamphetamine).  · Being male.  · Being .  · Being over the age of 55 years.  · Family history of stroke.  · Previous history of blood clots, stroke, TIA, or heart attack.  · Sickle cell disease.  SIGNS AND SYMPTOMS   TIA symptoms are the same as a stroke but are temporary. These symptoms usually develop suddenly, or may be newly present upon waking from sleep:  · Sudden weakness or numbness of the face, arm, or leg, especially on one side of the body.  · Sudden trouble walking or difficulty moving arms or legs.  · Sudden " confusion.  · Sudden personality changes.  · Trouble speaking (aphasia) or understanding.  · Difficulty swallowing.  · Sudden trouble seeing in one or both eyes.  · Double vision.  · Dizziness.  · Loss of balance or coordination.  · Sudden severe headache with no known cause.  · Trouble reading or writing.  · Loss of bowel or bladder control.  · Loss of consciousness.  DIAGNOSIS   Your health care provider may be able to determine the presence or absence of a TIA based on your symptoms, history, and physical exam. CT scan of the brain is usually performed to help identify a TIA. Other tests may include:  · Electrocardiography (ECG).  · Continuous heart monitoring.  · Echocardiography.  · Carotid ultrasonography.  · MRI.  · A scan of the brain circulation.  · Blood tests.  TREATMENT   Since the symptoms of TIA are the same as a stroke, it is important to seek treatment as soon as possible. You may need a medicine to dissolve a blood clot (thrombolytic) if that is the cause of the TIA. This medicine cannot be given if too much time has passed. Treatment may also include:   · Rest, oxygen, fluids through an IV tube, and medicines to thin the blood (anticoagulants).  · Measures will be taken to prevent short-term and long-term complications, including infection from breathing foreign material into the lungs (aspiration pneumonia), blood clots in the legs, and falls.  · Procedures to either remove plaque in the carotid arteries or dilate carotid arteries that have narrowed due to plaque. Those procedures are:  ¨ Carotid endarterectomy.  ¨ Carotid angioplasty and stenting.  · Medicines and diet may be used to address diabetes, high blood pressure, and other underlying risk factors.  HOME CARE INSTRUCTIONS   · Take medicines only as directed by your health care provider. Follow the directions carefully. Medicines may be used to control risk factors for a stroke. Be sure you understand all your medicine instructions.  · You  may be told to take aspirin or the anticoagulant warfarin. Warfarin needs to be taken exactly as instructed.  ¨ Taking too much or too little warfarin is dangerous. Too much warfarin increases the risk of bleeding. Too little warfarin continues to allow the risk for blood clots. While taking warfarin, you will need to have regular blood tests to measure your blood clotting time. A PT blood test measures how long it takes for blood to clot. Your PT is used to calculate another value called an INR. Your PT and INR help your health care provider to adjust your dose of warfarin. The dose can change for many reasons. It is critically important that you take warfarin exactly as prescribed.  ¨ Many foods, especially foods high in vitamin K can interfere with warfarin and affect the PT and INR. Foods high in vitamin K include spinach, kale, broccoli, cabbage, elen and turnip greens, [place] sprouts, peas, cauliflower, seaweed, and parsley, as well as beef and pork liver, green tea, and soybean oil. You should eat a consistent amount of foods high in vitamin K. Avoid major changes in your diet, or notify your health care provider before changing your diet. Arrange a visit with a dietitian to answer your questions.  ¨ Many medicines can interfere with warfarin and affect the PT and INR. You must tell your health care provider about any and all medicines you take; this includes all vitamins and supplements. Be especially cautious with aspirin and anti-inflammatory medicines. Do not take or discontinue any prescribed or over-the-counter medicine except on the advice of your health care provider or pharmacist.  ¨ Warfarin can have side effects, such as excessive bruising or bleeding. You will need to hold pressure over cuts for longer than usual. Your health care provider or pharmacist will discuss other potential side effects.  ¨ Avoid sports or activities that may cause injury or bleeding.  ¨ Be careful when shaving,  flossing your teeth, or handling sharp objects.  ¨ Alcohol can change the body's ability to handle warfarin. It is best to avoid alcoholic drinks or consume only very small amounts while taking warfarin. Notify your health care provider if you change your alcohol intake.  ¨ Notify your dentist or other health care providers before procedures.  · Eat a diet that includes 5 or more servings of fruits and vegetables each day. This may reduce the risk of stroke. Certain diets may be prescribed to address high blood pressure, high cholesterol, diabetes, or obesity.  ¨ A diet low in sodium, saturated fat, trans fat, and cholesterol is recommended to manage high blood pressure.  ¨ A diet low in saturated fat, trans fat, and cholesterol, and high in fiber may control cholesterol levels.  ¨ A controlled-carbohydrate, controlled-sugar diet is recommended to manage diabetes.  ¨ A reduced-calorie diet that is low in sodium, saturated fat, trans fat, and cholesterol is recommended to manage obesity.  · Maintain a healthy weight.  · Stay physically active. It is recommended that you get at least 30 minutes of activity on most or all days.  · Do not use any tobacco products, including cigarettes, chewing tobacco, or electronic cigarettes. If you need help quitting, ask your health care provider.  · Limit alcohol intake to no more than 1 drink per day for nonpregnant women and 2 drinks per day for men. One drink equals 12 ounces of beer, 5 ounces of wine, or 1½ ounces of hard liquor.  · Do not abuse drugs.  · A safe home environment is important to reduce the risk of falls. Your health care provider may arrange for specialists to evaluate your home. Having grab bars in the bedroom and bathroom is often important. Your health care provider may arrange for equipment to be used at home, such as raised toilets and a seat for the shower.  · Follow all instructions for follow-up with your health care provider. This is very important.  This includes any referrals and lab tests. Proper follow-up can prevent a stroke or another TIA from occurring.  PREVENTION   The risk of a TIA can be decreased by appropriately treating high blood pressure, high cholesterol, diabetes, heart disease, and obesity, and by quitting smoking, limiting alcohol, and staying physically active.  SEEK MEDICAL CARE IF:  · You have personality changes.  · You have difficulty swallowing.  · You are seeing double.  · You have dizziness.  · You have a fever.  SEEK IMMEDIATE MEDICAL CARE IF:   Any of the following symptoms may represent a serious problem that is an emergency. Do not wait to see if the symptoms will go away. Get medical help right away. Call your local emergency services (911 in [place] Do not drive yourself to the hospital.  · You have sudden weakness or numbness of the face, arm, or leg, especially on one side of the body.  · You have sudden trouble walking or difficulty moving arms or legs.  · You have sudden confusion.  · You have trouble speaking (aphasia) or understanding.  · You have sudden trouble seeing in one or both eyes.  · You have a loss of balance or coordination.  · You have a sudden, severe headache with no known cause.  · You have new chest pain or an irregular heartbeat.  · You have a partial or total loss of consciousness.  MAKE SURE YOU:   · Understand these instructions.  · Will watch your condition.  · Will get help right away if you are not doing well or get worse.     This information is not intended to replace advice given to you by your health care provider. Make sure you discuss any questions you have with your health care provider.     Document Released: 09/27/2006 Document Revised: 01/08/2016 Document Reviewed: 03/25/2015  [employer] Interactive Patient Education ©2016 [employer]

## 2017-10-09 NOTE — ED PROVIDER NOTES
ED Provider Note    CHIEF COMPLAINT  Chief Complaint   Patient presents with   • Hypertension       HPI  Emmanuel Huerta is a 89 y.o. male here for evaluation of diaphoresis, hypertension, and confusion. Per the patient's caregiver, the patient had an episode of hypotension accompanied with some confusion. This is new for him, and these typically very alert. He currently denies any symptoms, and denies any paresthesias, vomiting, fever, or fall. He denies chest pain or shortness of breath. He states his blood pressure is much better now.    PAST MEDICAL HISTORY   has a past medical history of Cognitive impairment; Fracture of left femur (CMS-[facility]); GERD (gastroesophageal reflux disease); History of asthma; Kidney disease; Lower urinary tract symptoms (7/7/2016); Prostate CA (CMS-[facility]); Rib fracture; and Skin cancer.    SOCIAL HISTORY  Social History     Social History Main Topics   • Smoking status: Former Smoker     Types: Cigarettes     Quit date: 6/28/1970   • Smokeless tobacco: Never Used      Comment: 1/2 ppd 40 years; stopped 1990; still chews   • Alcohol use 0.0 oz/week   • Drug use: No   • Sexual activity: No       SURGICAL HISTORY   has a past surgical history that includes appendectomy; tonsillectomy; other orthopedic surgery; other; and cataract extraction with iol.    CURRENT MEDICATIONS  Home Medications    **Home medications have not yet been reviewed for this encounter**         ALLERGIES  No Known Allergies    REVIEW OF SYSTEMS  See HPI for further details. Review of systems as above, otherwise all other systems are negative.     PHYSICAL EXAM  Constitutional: Well developed, well nourished. No acute distress.  HEENT: Normocephalic, atraumatic. Posterior pharynx clear and moist.  Eyes:  EOMI. Normal sclera.  Neck: Supple, Full range of motion, nontender.  Chest/Pulmonary: clear to ausculation. Symmetrical expansion.   Cardio: Regular rate and rhythm with no murmur.   Abdomen: Soft, nontender. No  peritoneal signs. No guarding. No palpable masses.  Back: No CVA tenderness, nontender midline, no step offs.  Musculoskeletal: No deformity, no edema, neurovascular intact.   Neuro: Clear speech, appropriate, cooperative, cranial nerves II-XII grossly intact. Dorsi plantar flexion extension 4 out of 5 bilaterally, equal .  Alert and oriented x 1.  Psych: Normal mood and affect    Results for orders placed or performed during the hospital encounter of 10/09/17   CBC WITH DIFFERENTIAL   Result Value Ref Range    WBC 9.5 4.8 - 10.8 K/uL    RBC 3.78 (L) 4.70 - 6.10 M/uL    Hemoglobin 12.8 (L) 14.0 - 18.0 g/dL    Hematocrit 36.8 (L) 42.0 - 52.0 %    MCV 97.4 81.4 - 97.8 fL    MCH 33.9 (H) 27.0 - 33.0 pg    MCHC 34.8 33.7 - 35.3 g/dL    RDW 47.0 35.9 - 50.0 fL    Platelet Count 145 (L) 164 - 446 K/uL    MPV 8.9 (L) 9.0 - 12.9 fL    Neutrophils-Polys 51.60 44.00 - 72.00 %    Lymphocytes 42.20 (H) 22.00 - 41.00 %    Monocytes 4.80 0.00 - 13.40 %    Eosinophils 0.80 0.00 - 6.90 %    Basophils 0.30 0.00 - 1.80 %    Immature Granulocytes 0.30 0.00 - 0.90 %    Nucleated RBC 0.00 /100 WBC    Neutrophils (Absolute) 4.87 1.82 - 7.42 K/uL    Lymphs (Absolute) 3.99 1.00 - 4.80 K/uL    Monos (Absolute) 0.45 0.00 - 0.85 K/uL    Eos (Absolute) 0.08 0.00 - 0.51 K/uL    Baso (Absolute) 0.03 0.00 - 0.12 K/uL    Immature Granulocytes (abs) 0.03 0.00 - 0.11 K/uL    NRBC (Absolute) 0.00 K/uL   COMP METABOLIC PANEL   Result Value Ref Range    Sodium 135 135 - 145 mmol/L    Potassium 4.1 3.6 - 5.5 mmol/L    Chloride 104 96 - 112 mmol/L    Co2 22 20 - 33 mmol/L    Anion Gap 9.0 0.0 - 11.9    Glucose 104 (H) 65 - 99 mg/dL    Bun 30 (H) 8 - 22 mg/dL    Creatinine 1.59 (H) 0.50 - 1.40 mg/dL    Calcium 8.6 8.5 - 10.5 mg/dL    AST(SGOT) 16 12 - 45 U/L    ALT(SGPT) 13 2 - 50 U/L    Alkaline Phosphatase 83 30 - 99 U/L    Total Bilirubin 0.6 0.1 - 1.5 mg/dL    Albumin 3.7 3.2 - 4.9 g/dL    Total Protein 5.8 (L) 6.0 - 8.2 g/dL    Globulin 2.1 1.9  - 3.5 g/dL    A-G Ratio 1.8 g/dL   TROPONIN   Result Value Ref Range    Troponin I 0.01 0.00 - 0.04 ng/mL   ESTIMATED GFR   Result Value Ref Range    GFR If  49 (A) >60 mL/min/1.73 m 2    GFR If Non  41 (A) >60 mL/min/1.73 m 2     No orders to display         PROCEDURES     MEDICAL RECORD  I have reviewed patient's medical record and pertinent results are listed above.    COURSE & MEDICAL DECISION MAKING  I have reviewed any medical record information, laboratory studies and radiographic results as noted above.    I spoke to the pt and his step son at length, regarding the need for a further work up based on the pts confusion.  The pt does not want to stay, and his step son agrees to sign him out, against medical advice, and will return him here for any further issues or concerns.  He understands my concerns, but I cannot keep him here, if he and the pt do not want to stay.    If you have had any blood pressure issues while here in the emergency department, please see your doctor for a further evaluation or work up.    I discussed with the patient/step son the risks of their decision to leave without receiving the appropriate medical care. I discussed with the patient/step son the risks of their decision to refuse or withhold consent to receive appropriate medical care. The step son has the capacity to understand the risks and benefits described above. The patient is not intoxicated clinically, the patient's is alert and oriented x one and not able to make a good decision in my opinion. I discussed alternative treatments with the patient / step son. The patient was given discharge instructions and a followup plan as documented in the medical record. I have asked the step son to return at any time, with the patient,  to the emergency department for any reason.      Differential diagnoses include but not limited to: tia, cva, mi, pe, ptx, pneumonia     This patient presents with  confusion, and hypertension .  At this time, I have counseled the patient/family regarding their medications, pain control, and follow up.  They will continue their medications, if any, as prescribed.  They will return immediately for any worsening symptoms and/or any other medical concerns.  They will see their doctor, or contact the doctor provided, in 1-2 days for follow up.       FINAL IMPRESSION  TIA  AMA  Situational anxiety       Electronically signed by: Dejon Callejas, 10/9/2017 2:31 PM

## 2017-10-09 NOTE — ED NOTES
Pt. Presents to ED via EMS with HTN... Pt. States blood pressure at home was 185/80.... Pt. aao x3. No distress at this time

## 2017-10-10 ENCOUNTER — APPOINTMENT (OUTPATIENT)
Dept: RADIOLOGY | Facility: MEDICAL CENTER | Age: 82
End: 2017-10-10
Attending: EMERGENCY MEDICINE
Payer: MEDICARE

## 2017-10-10 ENCOUNTER — RESOLUTE PROFESSIONAL BILLING HOSPITAL PROF FEE (OUTPATIENT)
Dept: HOSPITALIST | Facility: MEDICAL CENTER | Age: 82
End: 2017-10-10
Payer: MEDICARE

## 2017-10-10 ENCOUNTER — HOSPITAL ENCOUNTER (OUTPATIENT)
Facility: MEDICAL CENTER | Age: 82
End: 2017-10-11
Attending: EMERGENCY MEDICINE | Admitting: HOSPITALIST
Payer: MEDICARE

## 2017-10-10 DIAGNOSIS — I10 HYPERTENSION, UNSPECIFIED TYPE: ICD-10-CM

## 2017-10-10 DIAGNOSIS — R51.9 ACUTE NONINTRACTABLE HEADACHE, UNSPECIFIED HEADACHE TYPE: ICD-10-CM

## 2017-10-10 LAB
APPEARANCE UR: CLEAR
BACTERIA #/AREA URNS HPF: NEGATIVE /HPF
BILIRUB UR QL STRIP.AUTO: NEGATIVE
COLOR UR: YELLOW
EKG IMPRESSION: NORMAL
EPI CELLS #/AREA URNS HPF: NEGATIVE /HPF
GLUCOSE UR STRIP.AUTO-MCNC: NEGATIVE MG/DL
HYALINE CASTS #/AREA URNS LPF: ABNORMAL /LPF
KETONES UR STRIP.AUTO-MCNC: NEGATIVE MG/DL
LEUKOCYTE ESTERASE UR QL STRIP.AUTO: NEGATIVE
MICRO URNS: ABNORMAL
NITRITE UR QL STRIP.AUTO: NEGATIVE
PH UR STRIP.AUTO: 5.5 [PH]
PROT UR QL STRIP: 100 MG/DL
RBC # URNS HPF: ABNORMAL /HPF
RBC UR QL AUTO: NEGATIVE
SP GR UR STRIP.AUTO: 1.01
TROPONIN I SERPL-MCNC: 0.04 NG/ML (ref 0–0.04)
UROBILINOGEN UR STRIP.AUTO-MCNC: 0.2 MG/DL
WBC #/AREA URNS HPF: ABNORMAL /HPF

## 2017-10-10 PROCEDURE — 70450 CT HEAD/BRAIN W/O DYE: CPT

## 2017-10-10 PROCEDURE — 99218 PR INITIAL OBSERVATION CARE,LEVL I: CPT | Performed by: HOSPITALIST

## 2017-10-10 PROCEDURE — 81001 URINALYSIS AUTO W/SCOPE: CPT

## 2017-10-10 PROCEDURE — G0378 HOSPITAL OBSERVATION PER HR: HCPCS

## 2017-10-10 PROCEDURE — 99285 EMERGENCY DEPT VISIT HI MDM: CPT

## 2017-10-10 PROCEDURE — 96374 THER/PROPH/DIAG INJ IV PUSH: CPT

## 2017-10-10 PROCEDURE — 93005 ELECTROCARDIOGRAM TRACING: CPT | Performed by: EMERGENCY MEDICINE

## 2017-10-10 PROCEDURE — 84484 ASSAY OF TROPONIN QUANT: CPT

## 2017-10-10 PROCEDURE — 700111 HCHG RX REV CODE 636 W/ 250 OVERRIDE (IP): Performed by: INTERNAL MEDICINE

## 2017-10-10 PROCEDURE — 71020 DX-CHEST-2 VIEWS: CPT

## 2017-10-10 RX ORDER — ONDANSETRON 2 MG/ML
4 INJECTION INTRAMUSCULAR; INTRAVENOUS EVERY 4 HOURS PRN
Status: DISCONTINUED | OUTPATIENT
Start: 2017-10-10 | End: 2017-10-11 | Stop reason: HOSPADM

## 2017-10-10 RX ORDER — BISACODYL 10 MG
10 SUPPOSITORY, RECTAL RECTAL
Status: DISCONTINUED | OUTPATIENT
Start: 2017-10-10 | End: 2017-10-11 | Stop reason: HOSPADM

## 2017-10-10 RX ORDER — ACETAMINOPHEN 325 MG/1
650 TABLET ORAL EVERY 6 HOURS PRN
Status: DISCONTINUED | OUTPATIENT
Start: 2017-10-10 | End: 2017-10-11 | Stop reason: HOSPADM

## 2017-10-10 RX ORDER — OMEPRAZOLE 20 MG/1
20 CAPSULE, DELAYED RELEASE ORAL DAILY
Status: DISCONTINUED | OUTPATIENT
Start: 2017-10-11 | End: 2017-10-11 | Stop reason: HOSPADM

## 2017-10-10 RX ORDER — POLYETHYLENE GLYCOL 3350 17 G/17G
1 POWDER, FOR SOLUTION ORAL
Status: DISCONTINUED | OUTPATIENT
Start: 2017-10-10 | End: 2017-10-11 | Stop reason: HOSPADM

## 2017-10-10 RX ORDER — LABETALOL HYDROCHLORIDE 5 MG/ML
10 INJECTION, SOLUTION INTRAVENOUS EVERY 6 HOURS PRN
Status: DISCONTINUED | OUTPATIENT
Start: 2017-10-10 | End: 2017-10-11 | Stop reason: HOSPADM

## 2017-10-10 RX ORDER — HYDRALAZINE HYDROCHLORIDE 20 MG/ML
20 INJECTION INTRAMUSCULAR; INTRAVENOUS EVERY 6 HOURS PRN
Status: DISCONTINUED | OUTPATIENT
Start: 2017-10-10 | End: 2017-10-11 | Stop reason: HOSPADM

## 2017-10-10 RX ORDER — ONDANSETRON 4 MG/1
4 TABLET, ORALLY DISINTEGRATING ORAL EVERY 4 HOURS PRN
Status: DISCONTINUED | OUTPATIENT
Start: 2017-10-10 | End: 2017-10-11 | Stop reason: HOSPADM

## 2017-10-10 RX ORDER — AMOXICILLIN 250 MG
2 CAPSULE ORAL 2 TIMES DAILY
Status: DISCONTINUED | OUTPATIENT
Start: 2017-10-10 | End: 2017-10-11 | Stop reason: HOSPADM

## 2017-10-10 RX ADMIN — HYDRALAZINE HYDROCHLORIDE 20 MG: 20 INJECTION INTRAMUSCULAR; INTRAVENOUS at 23:46

## 2017-10-10 ASSESSMENT — PAIN SCALES - GENERAL
PAINLEVEL_OUTOF10: 0

## 2017-10-10 ASSESSMENT — ENCOUNTER SYMPTOMS
DIAPHORESIS: 1
SPEECH CHANGE: 0
HEADACHES: 1
WEAKNESS: 0

## 2017-10-11 ENCOUNTER — APPOINTMENT (OUTPATIENT)
Dept: RADIOLOGY | Facility: MEDICAL CENTER | Age: 82
End: 2017-10-11
Attending: HOSPITALIST
Payer: MEDICARE

## 2017-10-11 ENCOUNTER — PATIENT OUTREACH (OUTPATIENT)
Dept: HEALTH INFORMATION MANAGEMENT | Facility: OTHER | Age: 82
End: 2017-10-11

## 2017-10-11 VITALS
HEIGHT: 63 IN | HEART RATE: 62 BPM | DIASTOLIC BLOOD PRESSURE: 71 MMHG | RESPIRATION RATE: 19 BRPM | TEMPERATURE: 98.8 F | OXYGEN SATURATION: 95 % | SYSTOLIC BLOOD PRESSURE: 153 MMHG | BODY MASS INDEX: 20.74 KG/M2 | WEIGHT: 117.06 LBS

## 2017-10-11 PROBLEM — I16.0 HYPERTENSIVE URGENCY: Status: ACTIVE | Noted: 2017-10-11

## 2017-10-11 PROBLEM — N17.9 ACUTE KIDNEY INJURY (HCC): Status: ACTIVE | Noted: 2017-10-11

## 2017-10-11 LAB
ANION GAP SERPL CALC-SCNC: 7 MMOL/L (ref 0–11.9)
BUN SERPL-MCNC: 29 MG/DL (ref 8–22)
CALCIUM SERPL-MCNC: 8.5 MG/DL (ref 8.5–10.5)
CHLORIDE SERPL-SCNC: 102 MMOL/L (ref 96–112)
CO2 SERPL-SCNC: 22 MMOL/L (ref 20–33)
CREAT SERPL-MCNC: 1.79 MG/DL (ref 0.5–1.4)
CRP SERPL HS-MCNC: 0.1 MG/DL (ref 0–0.75)
ERYTHROCYTE [SEDIMENTATION RATE] IN BLOOD BY WESTERGREN METHOD: 16 MM/HOUR (ref 0–20)
GFR SERPL CREATININE-BSD FRML MDRD: 35 ML/MIN/1.73 M 2
GLUCOSE SERPL-MCNC: 150 MG/DL (ref 65–99)
POTASSIUM SERPL-SCNC: 3.7 MMOL/L (ref 3.6–5.5)
SODIUM SERPL-SCNC: 131 MMOL/L (ref 135–145)
TSH SERPL DL<=0.005 MIU/L-ACNC: 2.4 UIU/ML (ref 0.3–3.7)

## 2017-10-11 PROCEDURE — 70551 MRI BRAIN STEM W/O DYE: CPT

## 2017-10-11 PROCEDURE — G0378 HOSPITAL OBSERVATION PER HR: HCPCS

## 2017-10-11 PROCEDURE — 700102 HCHG RX REV CODE 250 W/ 637 OVERRIDE(OP): Performed by: HOSPITALIST

## 2017-10-11 PROCEDURE — 86140 C-REACTIVE PROTEIN: CPT

## 2017-10-11 PROCEDURE — 99217 PR OBSERVATION CARE DISCHARGE: CPT | Performed by: INTERNAL MEDICINE

## 2017-10-11 PROCEDURE — 84443 ASSAY THYROID STIM HORMONE: CPT

## 2017-10-11 PROCEDURE — 85652 RBC SED RATE AUTOMATED: CPT

## 2017-10-11 PROCEDURE — 36415 COLL VENOUS BLD VENIPUNCTURE: CPT

## 2017-10-11 PROCEDURE — A9270 NON-COVERED ITEM OR SERVICE: HCPCS | Performed by: HOSPITALIST

## 2017-10-11 PROCEDURE — 80048 BASIC METABOLIC PNL TOTAL CA: CPT

## 2017-10-11 RX ORDER — ENALAPRILAT 1.25 MG/ML
1.25 INJECTION INTRAVENOUS EVERY 6 HOURS PRN
Status: DISCONTINUED | OUTPATIENT
Start: 2017-10-11 | End: 2017-10-11 | Stop reason: HOSPADM

## 2017-10-11 RX ORDER — HYDROXYZINE HYDROCHLORIDE 25 MG/1
25 TABLET, FILM COATED ORAL 3 TIMES DAILY PRN
Status: DISCONTINUED | OUTPATIENT
Start: 2017-10-11 | End: 2017-10-11

## 2017-10-11 RX ORDER — HYDROXYZINE HYDROCHLORIDE 25 MG/1
25 TABLET, FILM COATED ORAL 3 TIMES DAILY PRN
Status: DISCONTINUED | OUTPATIENT
Start: 2017-10-11 | End: 2017-10-11 | Stop reason: HOSPADM

## 2017-10-11 RX ADMIN — HYDROXYZINE HYDROCHLORIDE 25 MG: 25 TABLET ORAL at 02:13

## 2017-10-11 RX ADMIN — OMEPRAZOLE 20 MG: 20 CAPSULE, DELAYED RELEASE ORAL at 09:45

## 2017-10-11 ASSESSMENT — PATIENT HEALTH QUESTIONNAIRE - PHQ9
3. TROUBLE FALLING OR STAYING ASLEEP OR SLEEPING TOO MUCH: NOT AT ALL
8. MOVING OR SPEAKING SO SLOWLY THAT OTHER PEOPLE COULD HAVE NOTICED. OR THE OPPOSITE, BEING SO FIGETY OR RESTLESS THAT YOU HAVE BEEN MOVING AROUND A LOT MORE THAN USUAL: NOT AT ALL
5. POOR APPETITE OR OVEREATING: NOT AT ALL
SUM OF ALL RESPONSES TO PHQ9 QUESTIONS 1 AND 2: 0
SUM OF ALL RESPONSES TO PHQ QUESTIONS 1-9: 1
1. LITTLE INTEREST OR PLEASURE IN DOING THINGS: NOT AT ALL
6. FEELING BAD ABOUT YOURSELF - OR THAT YOU ARE A FAILURE OR HAVE LET YOURSELF OR YOUR FAMILY DOWN: NOT AL ALL
2. FEELING DOWN, DEPRESSED, IRRITABLE, OR HOPELESS: NOT AT ALL
4. FEELING TIRED OR HAVING LITTLE ENERGY: NOT AT ALL
7. TROUBLE CONCENTRATING ON THINGS, SUCH AS READING THE NEWSPAPER OR WATCHING TELEVISION: SEVERAL DAYS
9. THOUGHTS THAT YOU WOULD BE BETTER OFF DEAD, OR OF HURTING YOURSELF: NOT AT ALL

## 2017-10-11 ASSESSMENT — LIFESTYLE VARIABLES
EVER HAD A DRINK FIRST THING IN THE MORNING TO STEADY YOUR NERVES TO GET RID OF A HANGOVER: NO
TOTAL SCORE: 0
EVER_SMOKED: YES
CONSUMPTION TOTAL: NEGATIVE
TOTAL SCORE: 0
EVER FELT BAD OR GUILTY ABOUT YOUR DRINKING: NO
AVERAGE NUMBER OF DAYS PER WEEK YOU HAVE A DRINK CONTAINING ALCOHOL: 0
ON A TYPICAL DAY WHEN YOU DRINK ALCOHOL HOW MANY DRINKS DO YOU HAVE: 0
ALCOHOL_USE: YES
HAVE YOU EVER FELT YOU SHOULD CUT DOWN ON YOUR DRINKING: NO
HOW MANY TIMES IN THE PAST YEAR HAVE YOU HAD 5 OR MORE DRINKS IN A DAY: 0
HAVE PEOPLE ANNOYED YOU BY CRITICIZING YOUR DRINKING: NO
TOTAL SCORE: 0

## 2017-10-11 ASSESSMENT — PAIN SCALES - GENERAL: PAINLEVEL_OUTOF10: 0

## 2017-10-11 NOTE — ED NOTES
ADMITTING MD IS AT BEDSIDE. PT IS AAOX4, PTS URINE WAS SENT. AS PER MD PT CAN HAVE A SNACK. PT WAS GIVEN A SNACK, PT HAS FAMILY AT BEDSIDE.

## 2017-10-11 NOTE — ED PROVIDER NOTES
ED Provider Note    Scribed for ANITA Anne II* by Juan Miguel Bill. 10/10/2017  6:29 PM    Means of Arrival: EMS  History obtained by: Patient  Limitations: None    CHIEF COMPLAINT  Chief Complaint   Patient presents with   • Hypertension   • Headache       HPI  Emmanuel Huerta is a 89 y.o. male who presents complaining of an intermittent headache and hypertension onset yesterday. Patient was seen by Dr. Callejas, but he left AMA yesterday and could not be admitted and did not receive a CT scan. He was prompted to come here again today because his blood pressure was very high. He denies any headache in the exam room. He reports associated diaphoresis with episodes today and yesterday. He denies any weakness, speech change, or chest pain. He does not have a history of diabetes mellitus. He is taking anxiety Atarax for anxiety as well as Prilosec. He is not taking any medication for hypertension management.    REVIEW OF SYSTEMS  Review of Systems   Constitutional: Positive for diaphoresis.   Cardiovascular: Negative for chest pain.        Positive for hypertension.   Neurological: Positive for headaches. Negative for speech change and weakness.   All other systems reviewed and are negative.  See HPI for further details.  C    PAST MEDICAL HISTORY   has a past medical history of Cognitive impairment; Fracture of left femur (CMS-HCC); GERD (gastroesophageal reflux disease); History of asthma; Kidney disease; Lower urinary tract symptoms (7/7/2016); Prostate CA (CMS-HCC); Rib fracture; and Skin cancer.    SOCIAL HISTORY  Social History     Social History Main Topics   • Smoking status: Former Smoker     Types: Cigarettes     Quit date: 6/28/1970   • Smokeless tobacco: Never Used      Comment: 1/2 ppd 40 years; stopped 1990; still chews   • Alcohol use 0.0 oz/week   • Drug use: No   • Sexual activity: No       SURGICAL HISTORY   has a past surgical history that includes appendectomy; tonsillectomy;  "other orthopedic surgery; other; and cataract extraction with iol.    CURRENT MEDICATIONS  No current facility-administered medications on file prior to encounter.      Current Outpatient Prescriptions on File Prior to Encounter   Medication Sig Dispense Refill   • Melatonin 5 MG Cap Take  by mouth every bedtime.     • hydrOXYzine (ATARAX) 25 MG Tab Take 0.5 Tabs by mouth 3 times a day as needed for Anxiety. 45 Tab 3   • acetaminophen (TYLENOL) 325 MG Tab Take 2 Tabs by mouth every 6 hours as needed.     • omeprazole (PRILOSEC) 20 MG delayed-release capsule Take 1 Cap by mouth every day. 90 Cap 3       ALLERGIES  No Known Allergies    PHYSICAL EXAM  VITAL SIGNS: BP (!) 161/66   Pulse (!) 58   Temp 36.7 °C (98.1 °F)   Resp 16   Ht 1.6 m (5' 2.99\")   Wt 51.7 kg (113 lb 15.7 oz)   SpO2 97%   BMI 20.20 kg/m²     Pulse ox interpretation: I interpret this pulse ox as normal.  Constitutional: Alert in no apparent distress. Elderly  male.  HENT: No signs of trauma, Bilateral external ears normal, Nose normal.   Eyes: Pupils are normal, Conjunctiva normal, Non-icteric.   Neck: Normal range of motion, No tenderness, Supple, No stridor.   Lymphatic: No lymphadenopathy noted.   Cardiovascular: Regular rate and rhythm, no murmurs.   Thorax & Lungs: Normal breath sounds, No respiratory distress, No wheezing, No chest tenderness.   Abdomen: Bowel sounds normal, Soft, No tenderness, No masses, No pulsatile masses. No peritoneal signs.  Skin: Warm, Dry, No erythema, No rash.   Back: No bony tenderness, No CVA tenderness.   Extremities: Intact distal pulses, No edema, No tenderness, No cyanosis.  Musculoskeletal: Good range of motion in all major joints. No tenderness to palpation or major deformities noted.   Neurologic: Alert , Normal motor function, Normal sensory function, No focal deficits noted. NIH stroke score of zero.  Psychiatric: Affect normal, Judgment normal, Mood normal.     DIAGNOSTIC STUDIES / " PROCEDURES    EKG  EKG Interpretation:  Interpreted by me    Rhythm:  Normal sinus rhythm   Rate: 70  Normal Intervals  Axis: normal  Ectopy: artifact at basline in mutliple leads  Conduction: normal  ST Segments: no elevation or depression  T Waves: no acute change  Q Waves: none  Clinical Impression: Normal EKG without acute changes   Appears changed from EKG taken on 4/12/2017    LABS  Pertinent Labs & Imaging studies reviewed. (See chart for details)    RADIOLOGY  DX-CHEST-2 VIEWS   Final Result      1.  Old healed left-sided rib fractures.   2.  No acute cardiopulmonary disease.      CT-HEAD W/O   Final Result      1.  Moderate cerebral atrophy.   2.  Otherwise, unremarkable head CT. No evidence of acute hemorrhage or infarction.      MR-BRAIN-W/O    (Results Pending)   Pertinent Labs & Imaging studies reviewed. (See chart for details)    COURSE & MEDICAL DECISION MAKING  Pertinent Labs & Imaging studies reviewed. (See chart for details)    6:29 PM This is a 89 y.o. male who presents with episode of headache, diaphoresis and the differential diagnosis includes but is not limited to hypertensive emergency vs myocardial infarction vs TIA vs presyncope. Ordered for DX chest, CT head without contrast, and Troponin to evaluate. I reviewed yesterday's work up which was unremarkable.     8:06 PM Recheck: Patient re-evaluated at beside. Patient is sitting comfortably in bed reading his book.    8:19 PM I reviewed the patient's EKG, as above.     8:34 PM I discussed the patient's case and the above findings with Dr. Falk (Hospitalist) who agrees to admit the patient. I believe he would benefit from further observation for arrhythmias, alterations in blood pressure, presyncope. He will transfer care of the patient at this time.    8:50 PM Recheck: Patient re-evaluated at beside. Patient was laying bed and reading his book upon my arrival. He has no other complaints. Discussed patient's condition and treatment plan  including admittance. Patient's lab and radiology results discussed. After a thorough explanation of the necessary monitoring and subsequent imaging required with admittance the patient understood and agreed.    The patient will not drink alcohol nor drive with prescribed medications. The patient will return for worsening symptoms and is stable at the time of discharge. The patient verbalizes understanding and will comply.    DISPOSITION:  Patient will be admitted to Dr. Falk, Hospitalist, in guarded condition.    FINAL IMPRESSION   1. Hypertension, unspecified type    2. Acute nonintractable headache, unspecified headache type          I, Juan Miguel Bill (Scribe), am scribing for, and in the presence of, KASI Anne II.    Electronically signed by: Juan Miguel Bill (Noemyibe), 10/10/2017    IJensen II, M* personally performed the services described in this documentation, as scribed by Juan Miguel Bill in my presence, and it is both accurate and complete.    The note accurately reflects work and decisions made by me.  Jensen Alonso II  10/11/2017  1:35 AM

## 2017-10-11 NOTE — DISCHARGE SUMMARY
CHIEF COMPLAINT ON ADMISSION  Chief Complaint   Patient presents with   • Hypertension   • Headache       CODE STATUS  Full Code    HPI & HOSPITAL COURSE  Acute kidney injury (CMS-HCC)   Assessment & Plan    Mild at best.   Follow up to Stacy Cm M.D. Tomorrow anyway and obtain labs to track Cr- Currently no symptoms or indication of dialysis        Hypertensive urgency   Assessment & Plan    Presented with headaches  Had blood pressures over 200 systolic  Improved now without any intervention.  Discussed with Emmanuel Huerta and with his stepson thoroughly. I advised them to take notes on his blood pressure at home. They have an appointment to see Stacy Cm M.D. Tomorrow and they prefer not to add any medicines and will speak with Stacy Cm M.D. If a low dose blood pressure can be started. At this time, he is averaging 130-150 systolic. At discharge, he is afebrile, hemodynamically stable and walked with the cane as he usually does. They want to go home.  Follow up with Stacy Cm M.D. On 10/12 as scheduled        Head ache   Assessment & Plan    Likely secondary to hypertensive urgency/  Resolved.            Therefore, he is discharged in good and stable condition with close outpatient follow-up.    SPECIFIC OUTPATIENT FOLLOW-UP  Follow up with Stacy Cm M.D. Tomorrow 10/2 as scheduled    DISCHARGE PROBLEM LIST  Active Problems:    Head ache POA: Unknown    Hypertensive urgency POA: Unknown    Acute kidney injury (CMS-HCC) POA: Unknown  Resolved Problems:    * No resolved hospital problems. *      FOLLOW UP  Future Appointments  Date Time Provider Department Center   10/12/2017 9:20 AM Zhen Castañeda M.D. [facility] None   10/26/2017 8:00 AM Stacy Cm M.D. [facility] None     No follow-up provider specified.    MEDICATIONS ON DISCHARGE   Emmanuel Huerta   Home Medication Instructions [place]:[id number]    Printed on:10/11/17 6834   Medication Information                      acetaminophen  (TYLENOL) 325 MG Tab  Take 2 Tabs by mouth every 6 hours as needed.             hydrOXYzine (ATARAX) 25 MG Tab  Take 0.5 Tabs by mouth 3 times a day as needed for Anxiety.             Melatonin 5 MG Cap  Take  by mouth every bedtime.             omeprazole (PRILOSEC) 20 MG delayed-release capsule  Take 1 Cap by mouth every day.                   DIET  Orders Placed This Encounter   Procedures   • Diet Order     Standing Status:   Standing     Number of Occurrences:   1     Order Specific Question:   Diet:     Answer:   Regular [1]       ACTIVITY  As tolerated.  Weight bearing as tolerated  Usually walks with cane    CONSULTATIONS      PROCEDURES  Ct-head W/o    Result Date: 10/10/2017  10/10/2017 6:35 PM HISTORY/REASON FOR EXAM:  Headache. Hypertension and headache TECHNIQUE/EXAM DESCRIPTION AND NUMBER OF VIEWS: CT of the head without contrast. The study was performed on a helical multidetector CT scanner. Contiguous 2.5 mm axial sections were obtained from the skull base through the vertex. Up to date radiation dose reduction adjustments have been utilized to meet ALARA standards for radiation dose reduction. COMPARISON:  None available FINDINGS: The calvariae and skull base are unremarkable. There are no extraaxial fluid collections. There is a pattern of cerebral atrophy manifest as enlargement of sulcal markings and ventricular prominence. The ventricular system and basal cisterns are otherwise unremarkable. There are no areas of abnormal density in the brain substance. There are no hemorrhagic lesions. There are no mass effects or shift of midline structures. The brainstem and posterior fossa structures are unremarkable. Paranasal sinuses in the field of view are unremarkable. Mastoids in the field of view are unremarkable.     1.  Moderate cerebral atrophy. 2.  Otherwise, unremarkable head CT. No evidence of acute hemorrhage or infarction.    Dx-chest-2 Views    Result Date: 10/10/2017  10/10/2017 7:09 PM  HISTORY/REASON FOR EXAM:  Hypertension. Intermittent chest pain TECHNIQUE/EXAM DESCRIPTION AND NUMBER OF VIEWS: Two views of the chest. COMPARISON:  5/15/2017 FINDINGS: Soft tissues and bony structures show old healed left-sided rib fractures. Heart size within normal limits. Perivascular is normal. The lung fields are clear. No evidence of acute infiltrates or congestive failure. There is no effusion or pneumothorax.     1.  Old healed left-sided rib fractures. 2.  No acute cardiopulmonary disease.    Mr-brain-w/o    Result Date: 10/11/2017  10/11/2017 8:00 AM HISTORY/REASON FOR EXAM:  Headache. Intermittent headaches, dizziness, dementia TECHNIQUE/EXAM DESCRIPTION: MRI of the brain without contrast with attention to the temporal lobes. T1 sagittal, T2 fast spin-echo axial, T1 coronal, FLAIR coronal, Diffusion weighted and Apparent Diffusion Coefficient (ADC map) axial images were obtained of the whole brain. Additional T2 thin section oblique coronal images were obtained of the temporal lobes and hippocampal formations. The study was performed on a [employer] 1.5 Maryjo MRI scanner. COMPARISON:  Head CT 10/10/2017 FINDINGS: The calvariae are unremarkable.  There are no extra-axial fluid collections. There is pattern of advanced cerebral atrophy manifest as enlargement of sulcal markings and subarachnoid spaces as well as ventriculomegaly. Age-appropriate. Atrophy is generalized with no disproportionate temporal lobe or hippocampal atrophy. There is a pattern of moderate supratentorial white matter disease with patchy and focal areas of bright T2 and FLAIR signal in the subcortical and deep white matter of both hemispheres consistent with small vessel ischemic change versus demyelination or  gliosis. There are no mass effects or shift of midline structures.  There are no hemorrhagic lesions.  The diffusion weighted axial images show no evidence of acute cerebral infarction. The brainstem and posterior fossa  structures are unremarkable. Vascular flow voids in the vertebrobasilar and carotid arteries, [place], and dural venous sinuses are intact. The paranasal sinuses in the field of view are unremarkable. The mastoids are clear. There has been cataract surgery.     1.  Advanced cerebral atrophy. Age-appropriate. 2.  Moderate supratentorial white matter disease most consistent with microvascular ischemic change. 3.  No evidence of acute cerebral infarction, hemorrhage, or mass lesion.    Physical exam  General/Constitutional: No acute distress.   Head: Normocephalic, atraumatic  ENT: Oral mucosa is moist. No obvious pharyngeal exudates  Eyes: Pink conjunctiva, no scleral icterus  Neck: Supple, no lymphadenopathy  Cardiovascular: Normal rate and regular rhythm. S1,2 noted. No murmurs, gallops or rubs.  Pulmonary: Clear to auscultation bilaterally. No wheezes, rales or rhonchi.  Abdominal: Soft, nontender, not distended, bowel sounds normoactive. No guarding or peritoneal signs.  Musculoskeletal: No tenderness to palpation of chest wall.  Neurologic: Alert and oriented. Grossly nonfocal, moving all extremities.  Genitourinary: No gross hematuria  Skin: No obvious rash.  Psychiatric: Pleasant, cooperative.  Vitals Reviewed  Labs Reviewed  Imaging reviewed  Nursing notes reviewed    LABORATORY  Lab Results   Component Value Date/Time    SODIUM 131 (L) 10/11/2017 01:30 AM    POTASSIUM 3.7 10/11/2017 01:30 AM    CHLORIDE 102 10/11/2017 01:30 AM    CO2 22 10/11/2017 01:30 AM    GLUCOSE 150 (H) 10/11/2017 01:30 AM    BUN 29 (H) 10/11/2017 01:30 AM    CREATININE 1.79 (H) 10/11/2017 01:30 AM        Lab Results   Component Value Date/Time    WBC 9.5 10/09/2017 12:58 PM    HEMOGLOBIN 12.8 (L) 10/09/2017 12:58 PM    HEMATOCRIT 36.8 (L) 10/09/2017 12:58 PM    PLATELETCT 145 (L) 10/09/2017 12:58 PM        Total time of the discharge process exceeds 36 minutes

## 2017-10-11 NOTE — PROGRESS NOTES
"Pt is getting confused, asking this RN \"Can you tell me how I got here?\" Somebody needs to come back here and get me. Called pt's son and let him spoke to the pt. Talked to pt's son on the phone and pt's son told this RN when pt get stressed out he get confused. Per pt's son he have atarax 25 mg tab for anxiety. Paged MD on call for orders.  "

## 2017-10-11 NOTE — ED NOTES
PT IS AAOX4, PT IS IN NAD PT HAS A BED REPORT WAS CALLED. PT IS IN NAD, PT HAS NO S/S'S AT THIS TIME. PT WAS TOLD OF POC.

## 2017-10-11 NOTE — H&P
CHIEF COMPLAINT:  Headache.    HISTORY OF PRESENT ILLNESS:  This is a 89-year-old gentleman, who presented   for evaluation of headache yesterday.  He underwent an initial workup and a CT   scan was ordered; however, the patient decided to leave AMA.  He is back   again tonight for evaluation of the same problem.  His history is somewhat   limited as he does have some dementia and he is unable to give me very good   detail about his headache.  He reports headaches, which are apparently   intermittent.  They are associated with some dizziness.  His caregiver tells   me that he has otherwise been behaving entirely normally.  She states that he   has not had any other complaints.  There has been no fever, chills, or any   other unusual behavior.    REVIEW OF SYSTEMS:  Positive as noted, otherwise unobtainable due to patient's   dementia.    PREVIOUS MEDICAL HISTORY:  1.  Hypertension.  2.  Prostate cancer.  3.  Asthma.  4.  Stage III chronic kidney disease.    MEDICATIONS:  1.  Hydroxyzine 35 mg one-half p.o. t.i.d. for anxiety.  2.  Melatonin 5 mg at bedtime.  3.  Omeprazole 20 mg p.o. daily.    SOCIAL HISTORY:  Patient quit smoking in 1990.  He still chews tobacco   apparently.  He does not drink any alcohol.  He lives in an assisted living   setting with daytime caregivers.    SURGICAL HISTORY:  1.  Appendectomy.  2.  Tonsillectomy.  3.  ORIF of left femur fracture.  4.  Cataract extraction.    FAMILY HISTORY:  Irrelevant in a 89-year-old gentleman.    PHYSICAL EXAMINATION:  VITAL SIGNS:  Temperature 36.4, heart rate 80, respiratory rate 17, /88,   satting upper 90s on room air.  GENERAL:  The patient is awake and alert.  He is in no acute distress.  HEENT:  Head is normocephalic and atraumatic.  Mucous membranes are moist.    Sclerae and conjunctivae benign.  NECK:  Trachea is in the midline.  Neck is supple.  There is no JVD or bruits.  RESPIRATORY:  Clear to auscultation bilaterally.  CARDIAC:  A II/VI  systolic murmur, greatest at the right upper sternal border.  ABDOMEN:  Soft.  No guarding, rebound, hepatosplenomegaly, or masses.  EXTREMITIES:  No clubbing, cyanosis, or edema.  Peripheral pulses are   maintained and symmetric.  Calves are nontender to palpation.  SKIN:  Unremarkable.  NEUROLOGIC:  The patient is alert and oriented to the hospital.  He is   functioning at his baseline per his caregiver at the bedside.  His exam is   nonfocal.  PSYCHIATRIC:  NA.    LABORATORY DATA:  White count 9.5, hemoglobin 12.8, platelet count 145.    Sodium 135, potassium 4.1, BUN 30, creatinine 1.59.  LFTs are unremarkable.    Troponin I 0.04.  UA is unremarkable.    IMAGING STUDIES:  CT of the head; moderate cerebral atrophy.    ASSESSMENT AND PLAN:  A 89-year-old gentleman with problem list as follows:  1.  Headache:  Very difficult to ascertain exactly what is going on based on   his history, he is unable to give any.  He appears to be functioning at his   normal baseline, his exam is nonfocal, he does not have any worrisome finding   such as meningeal findings.  He has had no fever, chills per his caregiver.  I   do think an MRI is warranted in this 89-year-old gentleman _____ .  He has   had some episodes of hypertension, which could be contributing.  We will   follow him and titrate his medications.  2.  Hypertension:  Follow and titrate medications.       ____________________________________     DO LAURA Lorenzana / NESS    DD:  10/11/2017 00:57:33  DT:  10/11/2017 02:05:34    D#:  [id number]  Job#:  [id number]    cc: ALLY GIBSON MD

## 2017-10-11 NOTE — PROGRESS NOTES
Pt admitted from [facility] by jillian Cramer4, irritable and anxious with PIV on his left FA patent SL. Walked to his room steady on his feet. Oriented to his room, call light given. Placed on cardiac monitoring. Pt stated I don't want to be here, explained to the pt that he's only here for observation.

## 2017-10-11 NOTE — PROGRESS NOTES
Assessment done, Pt educated on plan of care. Waiting on dr rounds and to have mri done  Patient resting in bed, no signs of distress,  no complains of pain at this time. Call light within reach,  side rails up, will monitor. Condition stable

## 2017-10-11 NOTE — PROGRESS NOTES
Tech notified this RN that pt's EY=012/88 on his right arm, and rechecked on his left arm it was 195/88 P= 74. Paged on call MD.

## 2017-10-11 NOTE — ASSESSMENT & PLAN NOTE
Presented with headaches  Had blood pressures over 200 systolic  Improved now without any intervention.  Discussed with Emmanuel Huerta and with his stepson thoroughly. I advised them to take notes on his blood pressure at home. They have an appointment to see Stacy Cm M.D. Tomorrow and they prefer not to add any medicines and will speak with Stacy Cm M.D. If a low dose blood pressure can be started. At this time, he is averaging 130-150 systolic. At discharge, he is afebrile, hemodynamically stable and walked with the cane as he usually does. They want to go home.  Follow up with Stacy Cm M.D. On 10/12 as scheduled

## 2017-10-11 NOTE — DISCHARGE INSTRUCTIONS
Discharge Instructions    Discharged to home by car with relative. Discharged via wheelchair, hospital escort: Yes.  Special equipment needed: Not Applicable    Be sure to schedule a follow-up appointment with your primary care doctor or any specialists as instructed.     Discharge Plan:   Diet Plan: Discussed  Activity Level: Discussed  Confirmed Follow up Appointment: Appointment Scheduled  Confirmed Symptoms Management: Discussed  Medication Reconciliation Updated: Yes  Influenza Vaccine Indication: Not indicated: Previously immunized this influenza season and > 8 years of age (pt states 2 weeks ago)    I understand that a diet low in cholesterol, fat, and sodium is recommended for good health. Unless I have been given specific instructions below for another diet, I accept this instruction as my diet prescription.   Other diet: low fat    Special Instructions: None    · Is patient discharged on Warfarin / Coumadin?   No     · Is patient Post Blood Transfusion?  No    Depression / Suicide Risk    As you are discharged from this [facility] facility, it is important to learn how to keep safe from harming yourself.    Recognize the warning signs:  · Abrupt changes in personality, positive or negative- including increase in energy   · Giving away possessions  · Change in eating patterns- significant weight changes-  positive or negative  · Change in sleeping patterns- unable to sleep or sleeping all the time   · Unwillingness or inability to communicate  · Depression  · Unusual sadness, discouragement and loneliness  · Talk of wanting to die  · Neglect of personal appearance   · Rebelliousness- reckless behavior  · Withdrawal from people/activities they love  · Confusion- inability to concentrate     If you or a loved one observes any of these behaviors or has concerns about self-harm, here's what you can do:  · Talk about it- your feelings and reasons for harming yourself  · Remove any means that you might use to  hurt yourself (examples: pills, rope, extension cords, firearm)  · Get professional help from the community (Mental Health, Substance Abuse, psychological counseling)  · Do not be alone:Call your Safe Contact- someone whom you trust who will be there for you.  · Call your local CRISIS HOTLINE 593-9013 or 616-981-6799  · Call your local Children's Mobile Crisis Response Team [place] (853) 708-3323 or www.[employer]  · Call the toll free National Suicide Prevention Hotlines   · National Suicide Prevention Lifeline 895-519-PARQ (2588)  · National Hope Line Network 800-SUICIDE (522-7247)

## 2017-10-11 NOTE — ASSESSMENT & PLAN NOTE
Mild at best.   Follow up to Stacy Cm M.D. Tomorrow anyway and obtain labs to track Cr- Currently no symptoms or indication of dialysis

## 2017-10-11 NOTE — DISCHARGE PLANNING
Discussed with bedside RN and hospitalist during POC rounds,  pt assessed for any discharge needs.  Pt has support at home and verbalized discharge plans to bedside RN.  No needs identified at this time.  Will continue to follow for any further needs. Appt for f/u made by schedulers.      Care Transition Team Assessment    Information Source  Orientation : Oriented x 4, Disoriented to Time  Information Given By: Patient  Informant's Name: Da  Who is responsible for making decisions for patient? : Adult child  Name(s) of Primary Decision Maker: greyson Renner         Elopement Risk  Legal Hold: No  Ambulatory or Self Mobile in Wheelchair: Yes  Disoriented: No  Psychiatric Symptoms: None  History of Wandering: No  Elopement this Admit: No  Vocalizing Wanting to Leave: No  Displays Behaviors, Body Language Wanting to Leave: No-Not at Risk for Elopement  Elopement Risk: Not at Risk for Elopement    Interdisciplinary Discharge Planning  Does Admitting Nurse Feel This Could be a Complex Discharge?: No  Primary Care Physician: jesse  Lives with - Patient's Self Care Capacity: Adult Children  Support Systems: Family Member(s)  Housing / Facility: [address]  Do You Take your Prescribed Medications Regularly: Yes  Able to Return to Previous ADL's: Yes  Mobility Issues: Yes  Prior Services: None  Patient Expects to be Discharged to:: home with son  Assistance Needed: Yes  Durable Medical Equipment: Other - Specify (cane)    Discharge Preparedness  What is your plan after discharge?: Home with help  What are your discharge supports?: Child  Prior Functional Level: Ambulatory, Needs Assist with Activities of Daily Living, Needs Assist with Medication Management    Functional Assesment  Prior Functional Level: Ambulatory, Needs Assist with Activities of Daily Living, Needs Assist with Medication Management    Finances  Financial Barriers to Discharge: No  Prescription Coverage: Yes    Vision / Hearing Impairment  Right  Eye Vision: Impaired, Wears Glasses  Left Eye Vision: Impaired, Wears Glasses  Hearing Impairment: Both Ears, Hearing Device(s) Available    Values / Beliefs / Concerns  Special Hospitalization Concerns: n         Domestic Abuse  Have you ever been the victim of abuse or violence?: Not Sure  Physical Abuse or Sexual Abuse: No  Verbal Abuse or Emotional Abuse: No  Possible Abuse Reported to:: Not Applicable    Psychological Assessment  History of Substance Abuse: None    Discharge Risks or Barriers  Discharge risks or barriers?: No    Anticipated Discharge Information  Anticipated discharge disposition: Home  Discharge Address: [address] [place] [address]  232  Discharge Contact Phone Number: [phone]

## 2017-10-11 NOTE — ED NOTES
Pt. Presents to ED via EMS with reports of HTN and a HA.... aao x3. No distress at this time.... Pt. Was here for same complaint yesterday but left AMA before getting CT scan

## 2017-10-12 ENCOUNTER — OFFICE VISIT (OUTPATIENT)
Dept: INTERNAL MEDICINE | Facility: MEDICAL CENTER | Age: 82
End: 2017-10-12
Payer: MEDICARE

## 2017-10-12 VITALS
BODY MASS INDEX: 21.46 KG/M2 | WEIGHT: 116.6 LBS | HEIGHT: 62 IN | OXYGEN SATURATION: 93 % | DIASTOLIC BLOOD PRESSURE: 63 MMHG | TEMPERATURE: 97.7 F | SYSTOLIC BLOOD PRESSURE: 136 MMHG | HEART RATE: 73 BPM

## 2017-10-12 DIAGNOSIS — F41.9 ANXIETY: ICD-10-CM

## 2017-10-12 DIAGNOSIS — Z09 HOSPITAL DISCHARGE FOLLOW-UP: ICD-10-CM

## 2017-10-12 DIAGNOSIS — M25.50 ARTHRALGIA, UNSPECIFIED JOINT: ICD-10-CM

## 2017-10-12 DIAGNOSIS — I10 ESSENTIAL HYPERTENSION: Chronic | ICD-10-CM

## 2017-10-12 DIAGNOSIS — R51.9 NONINTRACTABLE HEADACHE, UNSPECIFIED CHRONICITY PATTERN, UNSPECIFIED HEADACHE TYPE: ICD-10-CM

## 2017-10-12 PROCEDURE — 99214 OFFICE O/P EST MOD 30 MIN: CPT | Performed by: INTERNAL MEDICINE

## 2017-10-12 RX ORDER — ACETAMINOPHEN 325 MG/1
500-1000 TABLET ORAL EVERY 6 HOURS PRN
Qty: 30 TAB | COMMUNITY
Start: 2017-10-12 | End: 2018-10-19

## 2017-10-12 RX ORDER — CITALOPRAM HYDROBROMIDE 10 MG/1
10 TABLET ORAL EVERY MORNING
Qty: 30 TAB | Refills: 0 | Status: SHIPPED | OUTPATIENT
Start: 2017-10-12 | End: 2017-10-26 | Stop reason: SDUPTHER

## 2017-10-12 NOTE — PROGRESS NOTES
"Geriatric Clinic Note  Patient: Emmanuel Huerta  Age: 89 y.o.   Gender: male  Author: Zhen Castañeda M.D.  PCP: Stacy Cm M.D.    Today's date: 10/12/17  Chief Complaint   Patient presents with   • Follow-Up     hospital follow up on B/P       HPI:  History obtained from patient, medical chart and family (step son).  Patient can only provide a limited history and is an inconsistent historian.     Patient has a PMH of recent hospitalization for HA, insomnia, CKD, MCI, elevated PSA with concern for prostate cancer, falls and dizziness.   Patient left AMA for the ED the day before being admitted under observation status for the same issue.   Discharged yesterday after being admitted under observation status for elevated BP.   Per his step son when the patient feels poorly he cannot describe any specific ailments other than perhaps a HA. It sounds like either this or routine BP measurements suggested the high readings. Looking at the vital review during the hospital stay does not suggest a significant amount of HTN, neither does the BP in clinic today. The patient does not every recall being on BP medications.     ED documentation suggests was mostly admitted for HA, where as the son discussed HTN being more the issue as they checked his BP at his independent living facility. He gets daily aid in many [facility] as his independent living facility and has a tenant almost every day.     Patient continue to have issues with anxiety, over the last six weeks he took about 40 doses of 12.5 mg hydroxyzine (half tab). They use during times the patient appears anxious and it appears to have good effect. It was remarked that his confusion improves with this medication.     The patient denies any current issues. Recalls his headaches as making him feel like a \"cloud\", upon further explanation he mentioned that that meant \"fuzzy\". He denied issues with vision or other sensory issues, but could not describe his HA in any more " detail. His son remarks the HA can improve by transitioning environments.     ROS:  Denies any hopelessness or anhedonia the last two weeks.  Denies insomnia.  Denies any concentration or attention issues  Denies any appetite issues  Denies any weight changes.   Denies any current CP, HA, SOB.  Endorses issues with short term memory.   Son endorses issues with anxiety, issues with attention. - these have been longstanding.     A ten point ROS is negative, other than as stated above, limited based on patient recall.     Past Medical History:   Diagnosis Date   • Lower urinary tract symptoms 2016   • Cognitive impairment    • Fracture of left femur (CMS-HCC)    • GERD (gastroesophageal reflux disease)    • History of asthma    • Kidney disease     with 2/2 hyperPTH   • Prostate CA (CMS-HCC)     PSA 18   • Rib fracture     personal history of fall on ice    • Skin cancer      Social History     Social History   • Marital status:      Spouse name: N/A   • Number of children: N/A   • Years of education: N/A     Occupational History   • Not on file.     Social History Main Topics   • Smoking status: Former Smoker     Types: Cigarettes     Quit date: 1970   • Smokeless tobacco: Never Used      Comment:  ppd 40 years; stopped ; still chews   • Alcohol use No   • Drug use: No   • Sexual activity: No     Other Topics Concern   • Not on file     Social History Narrative    Lives in apt alone.      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 No pertinent family history.  Allergies: Review of patient's allergies indicates no known allergies.  Current Outpatient Prescriptions on File Prior to Visit   Medication Sig Dispense Refill   • Melatonin 5 MG Cap Take  by mouth every bedtime.     • hydrOXYzine (ATARAX) 25 MG Tab Take 0.5 Tabs by mouth 3 times a day as needed for Anxiety. 45 Tab 3   • omeprazole (PRILOSEC) 20 MG  "delayed-release capsule Take 1 Cap by mouth every day. 90 Cap 3     No current facility-administered medications on file prior to visit.        Physical Exam:  /63   Pulse 73   Temp 36.5 °C (97.7 °F)   Ht 1.575 m (5' 2\")   Wt 52.9 kg (116 lb 9.6 oz)   SpO2 93%   BMI 21.33 kg/m²   Gen: NAD, pleasant, alert oriented to person and place (medical building, [place], [place]), not oriented to time and situation was inferred based on location  HEENT: many SK noted on head. Hard of hearing  Lungs: normal effort  Neuro: no sensory deficits noted  Psych: normal mood, affect, quiet    Labs: TSH, CMP, CBC    EKG: reviewed, low volatge, poor EKG, Qtc 462    Imaging: MRI and chest xray reviewed.     Assessment: 89 y/o male presenting for post hospital f/u. Main concerns include HTN and anxiety.     Plan:  Post hospital d/c - close f/u will again f/u in two weeks for follow up on starting SSRI, as below.     HTN essential - unclear if it is true HTN vs situational HTN. No history of treated HTN Overall not clearly hypertensive. Shared decision making between treatment of HTN vs focusing on QOL. Patient had limted understanding, but appears to value quality of life, rather than quality. No true data to rely upon starting BP medications in this age group. Step son would also feel his step father would want to focus on qquality.   -recommend not  To check BP except at medical encounters.     Anxiety - long standing, previously on benzo, now on hydroxzyine. Assessment is difficulty in setting of memory loss, though his daily use of hyyxrozine is informative. This med can be potentially harmful, given its mild-mod anticholinergic activity, lan in the setting of cognitive impairment. Discussed use of SSRI. Patient and son were open to this. Qtc 462 ms on 10/10/17  -start citalopram 10 mg daily - cautioned son to watch for confusion   -recheck chem 7 in one week to monitor for hyponatremia (131 during the hosptial stay), though I " feel this to be abbarent based on almost all other values in chart.   -consider EKG at next visit to assess QTc, though the decrease in use of hydroxyzine may counteract any QTc issues with the SSRI.   -f/u in two weeks for tolerance of medication and its assessment on mood    MCI - work up being completed by PCP, defer. Patient will get b12, b9 level (as ordered by PCP) with Chem 7 draw, though levels normal about one year ago.     HA - sounds benign, MRI unremarkable during the hospital stay. Unclear type of HA otherwise, due to limited history. son will attempt to get better history next them it occurs, as it occurs  -start APAP 500-1000 mg q 6 hours PRN. Patietn does not have history of liver dz or drink alcohol.   -advised against NSAID given CKD      CKD IV- h/o HTN, on chronic PPI.  -consider wean in future.     F/u in two weeks schedueld with Dr. Cm Sooner if needed.       Zhen Castañeda M.D.  Geriatric Physician

## 2017-10-12 NOTE — PATIENT INSTRUCTIONS
Stop checking your blood pressure. That way we can improve your quality of life.     We are starting citalopram. Take it daily. It may decrease the amount of hydroxyzine you use. It may help with your anxiety. It can take 2-8 weeks to see full effect and we may increase your dose for partial effectiveness.   Watch for confustion, if it occurs stop the medication and let us know.   Follow up in two weeks with Tuqan or sooner if needed.   We will check in your blood work in one week.     You can take acetaminophen (Tylenol) regular-strength 500mg 1-2 tablets every 8 hours as needed for headache. No more 4 grams in 24 hours. Do not combine with other acetaminophen (Tylenol) containing products. Do not drink alcohol while using an products with acetaminophen.

## 2017-10-20 ENCOUNTER — HOSPITAL ENCOUNTER (OUTPATIENT)
Dept: LAB | Facility: MEDICAL CENTER | Age: 82
End: 2017-10-20
Attending: INTERNAL MEDICINE
Payer: MEDICARE

## 2017-10-20 DIAGNOSIS — I10 ESSENTIAL HYPERTENSION: Chronic | ICD-10-CM

## 2017-10-20 DIAGNOSIS — F41.9 ANXIETY: ICD-10-CM

## 2017-10-20 DIAGNOSIS — R41.89 COGNITIVE IMPAIRMENT: ICD-10-CM

## 2017-10-20 LAB
ANION GAP SERPL CALC-SCNC: 11 MMOL/L (ref 0–11.9)
BUN SERPL-MCNC: 27 MG/DL (ref 8–22)
CALCIUM SERPL-MCNC: 9 MG/DL (ref 8.5–10.5)
CHLORIDE SERPL-SCNC: 97 MMOL/L (ref 96–112)
CO2 SERPL-SCNC: 26 MMOL/L (ref 20–33)
CREAT SERPL-MCNC: 1.71 MG/DL (ref 0.5–1.4)
FOLATE SERPL-MCNC: 14.5 NG/ML
GFR SERPL CREATININE-BSD FRML MDRD: 37 ML/MIN/1.73 M 2
GLUCOSE SERPL-MCNC: 119 MG/DL (ref 65–99)
POTASSIUM SERPL-SCNC: 4.3 MMOL/L (ref 3.6–5.5)
SODIUM SERPL-SCNC: 134 MMOL/L (ref 135–145)
TSH SERPL DL<=0.005 MIU/L-ACNC: 1.5 UIU/ML (ref 0.3–3.7)
VIT B12 SERPL-MCNC: 254 PG/ML (ref 211–911)

## 2017-10-20 PROCEDURE — 36415 COLL VENOUS BLD VENIPUNCTURE: CPT

## 2017-10-20 PROCEDURE — 82607 VITAMIN B-12: CPT

## 2017-10-20 PROCEDURE — 82746 ASSAY OF FOLIC ACID SERUM: CPT

## 2017-10-20 PROCEDURE — 84443 ASSAY THYROID STIM HORMONE: CPT

## 2017-10-20 PROCEDURE — 80048 BASIC METABOLIC PNL TOTAL CA: CPT

## 2017-10-26 ENCOUNTER — OFFICE VISIT (OUTPATIENT)
Dept: INTERNAL MEDICINE | Facility: MEDICAL CENTER | Age: 82
End: 2017-10-26
Payer: MEDICARE

## 2017-10-26 VITALS
SYSTOLIC BLOOD PRESSURE: 158 MMHG | HEART RATE: 72 BPM | BODY MASS INDEX: 20.98 KG/M2 | OXYGEN SATURATION: 92 % | TEMPERATURE: 98 F | WEIGHT: 114 LBS | DIASTOLIC BLOOD PRESSURE: 70 MMHG | HEIGHT: 62 IN

## 2017-10-26 DIAGNOSIS — D64.9 ANEMIA, UNSPECIFIED TYPE: ICD-10-CM

## 2017-10-26 DIAGNOSIS — C91.10 CLL (CHRONIC LYMPHOCYTIC LEUKEMIA) (HCC): ICD-10-CM

## 2017-10-26 DIAGNOSIS — Z85.828 HISTORY OF SKIN CANCER: ICD-10-CM

## 2017-10-26 DIAGNOSIS — I10 BENIGN ESSENTIAL HTN: ICD-10-CM

## 2017-10-26 DIAGNOSIS — G89.29 CHRONIC NONINTRACTABLE HEADACHE, UNSPECIFIED HEADACHE TYPE: ICD-10-CM

## 2017-10-26 DIAGNOSIS — E53.8 B12 DEFICIENCY: ICD-10-CM

## 2017-10-26 DIAGNOSIS — B35.1 ONYCHOMYCOSIS: ICD-10-CM

## 2017-10-26 DIAGNOSIS — F41.9 ANXIETY: ICD-10-CM

## 2017-10-26 DIAGNOSIS — N18.4 CKD (CHRONIC KIDNEY DISEASE) STAGE 4, GFR 15-29 ML/MIN (HCC): ICD-10-CM

## 2017-10-26 DIAGNOSIS — R41.89 COGNITIVE IMPAIRMENT: ICD-10-CM

## 2017-10-26 DIAGNOSIS — C61 PROSTATE CANCER (HCC): ICD-10-CM

## 2017-10-26 DIAGNOSIS — G47.00 INSOMNIA, UNSPECIFIED TYPE: ICD-10-CM

## 2017-10-26 DIAGNOSIS — E87.1 HYPONATREMIA: ICD-10-CM

## 2017-10-26 DIAGNOSIS — R26.81 GAIT INSTABILITY: ICD-10-CM

## 2017-10-26 DIAGNOSIS — R51.9 CHRONIC NONINTRACTABLE HEADACHE, UNSPECIFIED HEADACHE TYPE: ICD-10-CM

## 2017-10-26 PROCEDURE — 99215 OFFICE O/P EST HI 40 MIN: CPT | Performed by: INTERNAL MEDICINE

## 2017-10-26 RX ORDER — LANOLIN ALCOHOL/MO/W.PET/CERES
3 CREAM (GRAM) TOPICAL
COMMUNITY
End: 2018-03-01

## 2017-10-26 RX ORDER — LANOLIN ALCOHOL/MO/W.PET/CERES
1000 CREAM (GRAM) TOPICAL DAILY
COMMUNITY

## 2017-10-26 RX ORDER — CITALOPRAM HYDROBROMIDE 10 MG/1
10 TABLET ORAL
Qty: 30 TAB | Refills: 2 | Status: SHIPPED | OUTPATIENT
Start: 2017-10-26 | End: 2018-02-04 | Stop reason: SDUPTHER

## 2017-10-26 NOTE — PROGRESS NOTES
Follow-up     Chief Complaint   Patient presents with   • Medication Refill     Citalopram   • Other     If Dr would like to take over checking pt's metalolytes(?)   • Hospital Follow-up     [facility]   • Hypertension   • Other     Discuss about CPD's       HPI  History was obtained from the patient, merlin Roberson and a medical record review.   Doing OK.   Went to ER x 2 and hosp'd once for HTN.    BP came down, no meds started.   Memory becoming more of an issue lan in last few weeks.    Short term lan -- will forget that he has MD appt even though takes notes and step son reminds him.  CG helping prep pillbox.  Does bills - merlin oversees.  No driving.  Started on SSRI in last few weeks.    This last month has been using less hydroxyzine.      Still with gait imbalance in last several months.    Occ can get dizzy when he stands.  More of a LH.   No CP, heart palp, SOB, syncope.   Occ HA with stress. Takes Tylenol and gets better.  Off melatonin.      THREE CHRONIC CONDITIONS  CKD, stble  H/o asthma, stable  LUTS, stable    Past Medical History:   Diagnosis Date   • Cognitive impairment    • Fracture of left femur (CMS-HCC)    • GERD (gastroesophageal reflux disease)    • History of asthma    • Kidney disease     with 2/2 hyperPTH   • Lower urinary tract symptoms 7/7/2016   • Prostate CA (CMS-HCC)    • Rib fracture     personal history of fall on ice 2015   • Skin cancer        Past Surgical History:   Procedure Laterality Date   • APPENDECTOMY     • CATARACT EXTRACTION WITH IOL     • OTHER      nasal growth removal x 2   • OTHER ORTHOPEDIC SURGERY      L femur repair   • TONSILLECTOMY         Current Outpatient Prescriptions   Medication Sig Dispense Refill   • Docusate Calcium (STOOL SOFTENER PO) Take 1 Tab by mouth 2 Times a Day.     • melatonin 3 MG Tab Take 3 mg by mouth at bedtime as needed.     • Cyanocobalamin (VITAMIN B-12) 1000 MCG Tab Take 1,000 mcg by mouth every day.     • citalopram (CELEXA) 10 MG tablet  "Take 1 Tab by mouth every bedtime. 30 Tab 2   • acetaminophen (TYLENOL) 325 MG Tab Take 1.5-3 Tabs by mouth every 6 hours as needed. 30 Tab    • hydrOXYzine (ATARAX) 25 MG Tab Take 0.5 Tabs by mouth 3 times a day as needed for Anxiety. 45 Tab 3   • omeprazole (PRILOSEC) 20 MG delayed-release capsule Take 1 Cap by mouth every day. 90 Cap 3     No current facility-administered medications for this visit.        Allergies as of 10/26/2017   • (No Known Allergies)       Social History     Social History   • Marital status:      Spouse name: N/A   • Number of children: N/A   • Years of education: N/A     Occupational History   • Not on file.     Social History Main Topics   • Smoking status: Former Smoker     Types: Cigarettes     Quit date: 1970   • Smokeless tobacco: Never Used      Comment: 1/2 ppd 40 years; stopped ; still chews   • Alcohol use No   • Drug use: No   • Sexual activity: No     Other Topics Concern   • Not on file     Social History Narrative    Lives in [facility] alone.      [facility].     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    ROS:   Diff to obtain 2/2 memory impairment     /70   Pulse 72   Temp 36.7 °C (98 °F)   Ht 1.575 m (5' 2\")   Wt 51.7 kg (114 lb)   SpO2 92%   BMI 20.85 kg/m²     Physical Exam  General:  Alert and oriented.  No apparent distress.    Eyes: No scleral icterus. No conjunctival injection.  PERRL    Ears:  [place]    ENMT: L lateral mishapen but no lesions    Moist mucous membranes. Oropharynx clear. No erythema or exudates noted.     Neck: Supple. Trachea midline.    Resp: Clear to auscultation and percussion bilaterally. No rales, rhonchi, or wheezes.    Cardiovascular: Regular rate and normal rhythm. No murmurs, rubs or gallops. 2+ radial  pulses.  Unable to appreciate DP pulses    Abdomen: "     Musculoskeletal: No clubbing, cyanosis, edema.    Skin: Feet warm.  Toe nails thickened.   No skin breakdown on feet    Lymph:     Neuro:   Deferred given memory -- concerned he could lack judgement when testing strength     Psych: Mood euthymic. Affect congruent.      Other:     Labs/Studies  Oct 2017    CRN 1.71  B12 200s  WBC 9.5    Spring 2017  PSA 40s    Oct 2017  MRI brain without contrast  No mass    Assessment and Plan  1. Benign essential HTN  High today but generally fine at home  Watch for now  Checking orthostatics as occ gets LH  - ORTHOSTATIC BLOOD PRESSURE  - BASIC METABOLIC PANEL; Future    2. CKD (chronic kidney disease) stage 4, GFR 15-29 ml/min (CMS-HCC)  Sees neph  Stable   Monitor   Avoid nephrotoxic agents  Renally dose medications   - BASIC METABOLIC PANEL; Future    3. Hyponatremia  134 on SSRI    4. Anxiety  Off Xanax and Ativan  Better since rads tx  Better in last month  On  - citalopram (CELEXA) 10 MG tablet; Take 1 Tab by mouth every bedtime.  Dispense: 30 Tab; Refill: 2  Using less hydroxyzine  Hold off on EKG per pt preference    5. Cognitive impairment  Becoming more of an issue since hosp stay  Progression of presumed dementia given functional impairment  Supportive care for now  Tx B12  Tx anxiety    6. B12 deficiency  Started on B12  Check in few months  - VITAMIN B12; Future    7. Insomnia, unspecified type  Sleeping ok off meds    8. Gait instability  In past, when able to get more of history, seemed to be having 2 processes at least going on  1. The imbalance is related to decreased muscle strength/frailty and possibly meds  2. Occ he will get LH which will make imbalance worse  EKG done in past was NSR with no arrythmia  Orthostatics were negative in past but there could still be a component as he gets LH with standing  Defer further work up as burden of care too great given GOC  No recent falls  occ unsteady on feet  Check today  - ORTHOSTATIC BLOOD  PRESSURE  Declines PT -- will think about it    9. History of skin cancer  Squamous cell carcinoma of nose  S/p rads with marked improvement clinically   Needs to f/u with derm    10. Chronic nonintractable headache, unspecified headache type  Long standing but maybe worse  Had brain MRI without contrast that was negative for mass  Will hold off on brain MRI with contrast given GOC  Step son thinks it's related to stress   Controlled with Tylenol     11. CLL (chronic lymphocytic leukemia) (CMS-HCC)  Presumed given smudge cells in past  Watch WBC count  - CBC WITH DIFFERENTIAL; Future    12. Anemia, unspecified type  Low grade and stable  Watch for now  - CBC WITH DIFFERENTIAL; Future    13. Prostate cancer (CMS-HCC)  Wants to trend PSA but not interested in treatment  - PROSTATE SPECIFIC AG SCREENING; Future  Reports LUTS are tolerable and doesn't want to try other meds - failed tamsulosin 2/2 GI upset    14. Onychomycosis  Due for toe nail clipping      Imbalance  Light headedness    Arthralgia, unspecified joint  rec Tylenol renally dosed over NSAIDs  - acetaminophen (TYLENOL) 325 MG Tab; Take 2 Tabs by mouth every 6 hours as needed.    Gastroesophageal reflux disease, esophagitis presence not specified  Stable on PPI     Health maintenance   See immunizations   Got Prevnar and Pneumovax  TDAP UTD  Could get shingles shot  Flu shot 2017  Consider DEXA given fall and fracture - declines  Defer Ca screenings given age and comorbidities    ACP  Pt is DNR/DNI - limited med intervention ok.  Completed POLST Spring/Summer 2017.    Open to treating issues that are easily treatable with minimal burden of care.   Wants to avoid burdensome care.  Doesn't want to eval or treat for possible cancer (prostate and CLL) -- wants to focus on QOL.     Patient Instructions   Check your blood pressure weekly. Keep the log and bring it to your next appointment. Call the office sooner if your blood pressure is > 180/90 on 1 day, >  160/90 on 3 days, or > 140/90 persistently. Follow a low-salt diet.  Consider the DASH diet as well.      Cont Celexa.   Cut down on hydroxyzine.    Let me know if you want to do PT.      Make appt with nephrology, pods and derm.    Please get the following vaccines at a local pharmacy.    I recommend a Zostavax (shingles vaccine).      Come back in 3-4 months.  Come back sooner if needed.     I spent over 40 minutes in face-to-face time with this patient of which > 50% was devoted to counseling.  Topics included BP, anxiety, memory, CKD, foot issues.    Follow-up  Return in about 3 months (around 1/26/2018).    Signed by: Stacy Cm M.D.    Of note, pt left prior to orthostatics.

## 2017-10-26 NOTE — PATIENT INSTRUCTIONS
Check your blood pressure weekly. Keep the log and bring it to your next appointment. Call the office sooner if your blood pressure is > 180/90 on 1 day, > 160/90 on 3 days, or > 140/90 persistently. Follow a low-salt diet.  Consider the DASH diet as well.      Cont Celexa.   Cut down on hydroxyzine.    Let me know if you want to do PT.      Make appt with nephrology, pods and derm.    Please get the following vaccines at a local pharmacy.    I recommend a Zostavax (shingles vaccine).      Come back in 3-4 months.  Come back sooner if needed.

## 2018-01-23 ENCOUNTER — HOSPITAL ENCOUNTER (OUTPATIENT)
Dept: LAB | Facility: MEDICAL CENTER | Age: 83
End: 2018-01-23
Attending: INTERNAL MEDICINE
Payer: MEDICARE

## 2018-01-23 DIAGNOSIS — K21.9 GASTROESOPHAGEAL REFLUX DISEASE WITHOUT ESOPHAGITIS: Chronic | ICD-10-CM

## 2018-01-23 LAB
ALBUMIN SERPL BCP-MCNC: 4.1 G/DL (ref 3.2–4.9)
ALBUMIN/GLOB SERPL: 1.5 G/DL
ALP SERPL-CCNC: 102 U/L (ref 30–99)
ALT SERPL-CCNC: 11 U/L (ref 2–50)
ANION GAP SERPL CALC-SCNC: 4 MMOL/L (ref 0–11.9)
APPEARANCE UR: CLEAR
AST SERPL-CCNC: 10 U/L (ref 12–45)
BACTERIA #/AREA URNS HPF: NEGATIVE /HPF
BASOPHILS # BLD AUTO: 0.3 % (ref 0–1.8)
BASOPHILS # BLD: 0.03 K/UL (ref 0–0.12)
BILIRUB SERPL-MCNC: 0.9 MG/DL (ref 0.1–1.5)
BILIRUB UR QL STRIP.AUTO: NEGATIVE
BUN SERPL-MCNC: 37 MG/DL (ref 8–22)
CALCIUM SERPL-MCNC: 9.2 MG/DL (ref 8.5–10.5)
CHLORIDE SERPL-SCNC: 103 MMOL/L (ref 96–112)
CO2 SERPL-SCNC: 33 MMOL/L (ref 20–33)
COLOR UR: YELLOW
CREAT SERPL-MCNC: 2.02 MG/DL (ref 0.5–1.4)
EOSINOPHIL # BLD AUTO: 0.07 K/UL (ref 0–0.51)
EOSINOPHIL NFR BLD: 0.6 % (ref 0–6.9)
EPI CELLS #/AREA URNS HPF: NEGATIVE /HPF
ERYTHROCYTE [DISTWIDTH] IN BLOOD BY AUTOMATED COUNT: 53.9 FL (ref 35.9–50)
GLOBULIN SER CALC-MCNC: 2.8 G/DL (ref 1.9–3.5)
GLUCOSE SERPL-MCNC: 124 MG/DL (ref 65–99)
GLUCOSE UR STRIP.AUTO-MCNC: NEGATIVE MG/DL
HCT VFR BLD AUTO: 43.2 % (ref 42–52)
HGB BLD-MCNC: 13.8 G/DL (ref 14–18)
HYALINE CASTS #/AREA URNS LPF: ABNORMAL /LPF
IMM GRANULOCYTES # BLD AUTO: 0.03 K/UL (ref 0–0.11)
IMM GRANULOCYTES NFR BLD AUTO: 0.3 % (ref 0–0.9)
KETONES UR STRIP.AUTO-MCNC: NEGATIVE MG/DL
LEUKOCYTE ESTERASE UR QL STRIP.AUTO: NEGATIVE
LYMPHOCYTES # BLD AUTO: 5.02 K/UL (ref 1–4.8)
LYMPHOCYTES NFR BLD: 45.5 % (ref 22–41)
MCH RBC QN AUTO: 33.2 PG (ref 27–33)
MCHC RBC AUTO-ENTMCNC: 31.9 G/DL (ref 33.7–35.3)
MCV RBC AUTO: 103.8 FL (ref 81.4–97.8)
MICRO URNS: ABNORMAL
MONOCYTES # BLD AUTO: 0.48 K/UL (ref 0–0.85)
MONOCYTES NFR BLD AUTO: 4.4 % (ref 0–13.4)
NEUTROPHILS # BLD AUTO: 5.4 K/UL (ref 1.82–7.42)
NEUTROPHILS NFR BLD: 48.9 % (ref 44–72)
NITRITE UR QL STRIP.AUTO: NEGATIVE
NRBC # BLD AUTO: 0 K/UL
NRBC BLD-RTO: 0 /100 WBC
PH UR STRIP.AUTO: 6 [PH]
PHOSPHATE SERPL-MCNC: 3.9 MG/DL (ref 2.5–4.5)
PLATELET # BLD AUTO: 189 K/UL (ref 164–446)
PMV BLD AUTO: 9.1 FL (ref 9–12.9)
POTASSIUM SERPL-SCNC: 4.4 MMOL/L (ref 3.6–5.5)
PROT SERPL-MCNC: 6.9 G/DL (ref 6–8.2)
PROT UR QL STRIP: 100 MG/DL
PTH-INTACT SERPL-MCNC: 145.7 PG/ML (ref 14–72)
RBC # BLD AUTO: 4.16 M/UL (ref 4.7–6.1)
RBC # URNS HPF: ABNORMAL /HPF
RBC UR QL AUTO: NEGATIVE
SODIUM SERPL-SCNC: 140 MMOL/L (ref 135–145)
SP GR UR STRIP.AUTO: 1.01
UROBILINOGEN UR STRIP.AUTO-MCNC: 0.2 MG/DL
WBC # BLD AUTO: 11 K/UL (ref 4.8–10.8)
WBC #/AREA URNS HPF: ABNORMAL /HPF

## 2018-01-23 PROCEDURE — 36415 COLL VENOUS BLD VENIPUNCTURE: CPT

## 2018-01-23 PROCEDURE — 83970 ASSAY OF PARATHORMONE: CPT

## 2018-01-23 PROCEDURE — 84100 ASSAY OF PHOSPHORUS: CPT

## 2018-01-23 PROCEDURE — 80053 COMPREHEN METABOLIC PANEL: CPT

## 2018-01-23 PROCEDURE — 85025 COMPLETE CBC W/AUTO DIFF WBC: CPT

## 2018-01-23 PROCEDURE — 81001 URINALYSIS AUTO W/SCOPE: CPT

## 2018-01-23 RX ORDER — OMEPRAZOLE 20 MG/1
CAPSULE, DELAYED RELEASE ORAL
Qty: 90 CAP | Refills: 3 | Status: SHIPPED | OUTPATIENT
Start: 2018-01-23 | End: 2019-04-10 | Stop reason: SDUPTHER

## 2018-01-23 NOTE — TELEPHONE ENCOUNTER
Last seen: 10/26/17 by Dr. Cm  Next appt: 03/01/18 with Dr. Cm    Was the patient seen in the last year in this department? Yes   Does patient have an active prescription for medications requested? No   Received Request Via: Pharmacy

## 2018-02-04 DIAGNOSIS — F41.9 ANXIETY: ICD-10-CM

## 2018-02-05 RX ORDER — CITALOPRAM HYDROBROMIDE 10 MG/1
TABLET ORAL
Qty: 90 TAB | Refills: 1 | Status: SHIPPED | OUTPATIENT
Start: 2018-02-05 | End: 2018-06-01 | Stop reason: SDUPTHER

## 2018-02-22 ENCOUNTER — TELEPHONE (OUTPATIENT)
Dept: INTERNAL MEDICINE | Facility: MEDICAL CENTER | Age: 83
End: 2018-02-22

## 2018-02-22 NOTE — TELEPHONE ENCOUNTER
1. Caller Name: Da                      Call Back Number: [phone] (home)       2. Message: 02/18/18-Da called and l/m. Requesting a lab order for pt to get done prior to appt.    3. Patient approves office to leave a detailed voicemail/MyChart message: N\A    Called and spoke with Da. Notified Da, pt does have lab orders inside his chart. That the [facility] should be able to pull those off. He understood

## 2018-02-23 ENCOUNTER — HOSPITAL ENCOUNTER (OUTPATIENT)
Dept: LAB | Facility: MEDICAL CENTER | Age: 83
End: 2018-02-23
Attending: INTERNAL MEDICINE
Payer: MEDICARE

## 2018-02-23 DIAGNOSIS — D64.9 ANEMIA, UNSPECIFIED TYPE: ICD-10-CM

## 2018-02-23 DIAGNOSIS — I10 BENIGN ESSENTIAL HTN: ICD-10-CM

## 2018-02-23 DIAGNOSIS — C61 PROSTATE CANCER (HCC): ICD-10-CM

## 2018-02-23 DIAGNOSIS — N18.4 CKD (CHRONIC KIDNEY DISEASE) STAGE 4, GFR 15-29 ML/MIN (HCC): ICD-10-CM

## 2018-02-23 DIAGNOSIS — C91.10 CLL (CHRONIC LYMPHOCYTIC LEUKEMIA) (HCC): ICD-10-CM

## 2018-02-23 DIAGNOSIS — E53.8 B12 DEFICIENCY: ICD-10-CM

## 2018-02-23 LAB
ANION GAP SERPL CALC-SCNC: 9 MMOL/L (ref 0–11.9)
BASOPHILS # BLD AUTO: 0.2 % (ref 0–1.8)
BASOPHILS # BLD: 0.02 K/UL (ref 0–0.12)
BUN SERPL-MCNC: 32 MG/DL (ref 8–22)
CALCIUM SERPL-MCNC: 9.1 MG/DL (ref 8.5–10.5)
CHLORIDE SERPL-SCNC: 102 MMOL/L (ref 96–112)
CO2 SERPL-SCNC: 27 MMOL/L (ref 20–33)
CREAT SERPL-MCNC: 1.96 MG/DL (ref 0.5–1.4)
EOSINOPHIL # BLD AUTO: 0.08 K/UL (ref 0–0.51)
EOSINOPHIL NFR BLD: 0.8 % (ref 0–6.9)
ERYTHROCYTE [DISTWIDTH] IN BLOOD BY AUTOMATED COUNT: 55.5 FL (ref 35.9–50)
GLUCOSE SERPL-MCNC: 105 MG/DL (ref 65–99)
HCT VFR BLD AUTO: 42 % (ref 42–52)
HGB BLD-MCNC: 14 G/DL (ref 14–18)
IMM GRANULOCYTES # BLD AUTO: 0.02 K/UL (ref 0–0.11)
IMM GRANULOCYTES NFR BLD AUTO: 0.2 % (ref 0–0.9)
LYMPHOCYTES # BLD AUTO: 4.13 K/UL (ref 1–4.8)
LYMPHOCYTES NFR BLD: 42.4 % (ref 22–41)
MCH RBC QN AUTO: 35.1 PG (ref 27–33)
MCHC RBC AUTO-ENTMCNC: 33.3 G/DL (ref 33.7–35.3)
MCV RBC AUTO: 105.3 FL (ref 81.4–97.8)
MONOCYTES # BLD AUTO: 0.82 K/UL (ref 0–0.85)
MONOCYTES NFR BLD AUTO: 8.4 % (ref 0–13.4)
NEUTROPHILS # BLD AUTO: 4.67 K/UL (ref 1.82–7.42)
NEUTROPHILS NFR BLD: 48 % (ref 44–72)
NRBC # BLD AUTO: 0 K/UL
NRBC BLD-RTO: 0 /100 WBC
PLATELET # BLD AUTO: 162 K/UL (ref 164–446)
PMV BLD AUTO: 9.3 FL (ref 9–12.9)
POTASSIUM SERPL-SCNC: 4.4 MMOL/L (ref 3.6–5.5)
PSA SERPL-MCNC: 132.48 NG/ML (ref 0–4)
RBC # BLD AUTO: 3.99 M/UL (ref 4.7–6.1)
SODIUM SERPL-SCNC: 138 MMOL/L (ref 135–145)
VIT B12 SERPL-MCNC: 709 PG/ML (ref 211–911)
WBC # BLD AUTO: 9.7 K/UL (ref 4.8–10.8)

## 2018-02-23 PROCEDURE — 82607 VITAMIN B-12: CPT

## 2018-02-23 PROCEDURE — 84153 ASSAY OF PSA TOTAL: CPT

## 2018-02-23 PROCEDURE — 80048 BASIC METABOLIC PNL TOTAL CA: CPT

## 2018-02-23 PROCEDURE — 36415 COLL VENOUS BLD VENIPUNCTURE: CPT

## 2018-02-23 PROCEDURE — 85025 COMPLETE CBC W/AUTO DIFF WBC: CPT

## 2018-03-01 ENCOUNTER — OFFICE VISIT (OUTPATIENT)
Dept: INTERNAL MEDICINE | Facility: MEDICAL CENTER | Age: 83
End: 2018-03-01
Payer: MEDICARE

## 2018-03-01 VITALS
OXYGEN SATURATION: 94 % | HEIGHT: 62 IN | HEART RATE: 66 BPM | TEMPERATURE: 97.8 F | SYSTOLIC BLOOD PRESSURE: 102 MMHG | BODY MASS INDEX: 21.37 KG/M2 | WEIGHT: 116.13 LBS | DIASTOLIC BLOOD PRESSURE: 54 MMHG

## 2018-03-01 DIAGNOSIS — R97.20 ELEVATED PSA: ICD-10-CM

## 2018-03-01 DIAGNOSIS — E53.9 VITAMIN B DEFICIENCY: ICD-10-CM

## 2018-03-01 DIAGNOSIS — F41.9 ANXIETY: ICD-10-CM

## 2018-03-01 DIAGNOSIS — N18.4 CKD (CHRONIC KIDNEY DISEASE) STAGE 4, GFR 15-29 ML/MIN (HCC): ICD-10-CM

## 2018-03-01 DIAGNOSIS — R41.0 TRANSIENT DISORIENTATION: ICD-10-CM

## 2018-03-01 DIAGNOSIS — D72.829 LEUKOCYTOSIS, UNSPECIFIED TYPE: ICD-10-CM

## 2018-03-01 DIAGNOSIS — G47.00 INSOMNIA, UNSPECIFIED TYPE: ICD-10-CM

## 2018-03-01 PROCEDURE — 99214 OFFICE O/P EST MOD 30 MIN: CPT | Performed by: INTERNAL MEDICINE

## 2018-03-01 RX ORDER — HYDROXYZINE HYDROCHLORIDE 25 MG/1
12.5 TABLET, FILM COATED ORAL
Status: SHIPPED | DISCHARGE
Start: 2018-03-01 | End: 2018-06-01

## 2018-03-01 RX ORDER — MELATONIN 10 MG
1 CAPSULE ORAL DAILY
Status: SHIPPED | DISCHARGE
Start: 2018-03-01 | End: 2018-06-01

## 2018-03-01 ASSESSMENT — PATIENT HEALTH QUESTIONNAIRE - PHQ9: CLINICAL INTERPRETATION OF PHQ2 SCORE: 0

## 2018-03-01 NOTE — PROGRESS NOTES
"Follow-up     No chief complaint on file.      HPI  History was obtained from the patient, merlin Da and a medical record review.    Sitting up at table, turned to R and smiled at someone.    Turned head back to take and tried to  knife and was grasping at air.   Took deep breath in and then was fine.    A few days later, son found a note re: the event where pt wrote, \"loss of normal thought and body control.  Rats running in mind.\"    Short term memory an issue -- better though since last visit.  On Celexa.  Hasn't been using much hydroxyzine for anxiety.  None some months.  Last month - used 1/2 tab.    Seeing derm for BCCa nose -- coming back.  Needs to f/u with derm      Short term lan -- will forget that he has MD appt even though takes notes and step son reminds him.  CG helping prep pillbox.  Does bills - merlin oversees a little.  No driving.    Still with gait imbalance in last several months.  No recent falls.    THREE CHRONIC CONDITIONS  CKD, stble  H/o asthma, stable  LUTS, stable    Past Medical History:   Diagnosis Date   • Cognitive impairment    • Fracture of left femur (CMS-[facility])    • GERD (gastroesophageal reflux disease)    • History of asthma    • Kidney disease     with 2/2 hyperPTH   • Lower urinary tract symptoms 7/7/2016   • Prostate CA (CMS-HCC)    • Rib fracture     personal history of fall on ice 2015   • Skin cancer        Past Surgical History:   Procedure Laterality Date   • APPENDECTOMY     • CATARACT EXTRACTION WITH IOL     • OTHER      nasal growth removal x 2   • OTHER ORTHOPEDIC SURGERY      L femur repair   • TONSILLECTOMY         Current Outpatient Prescriptions   Medication Sig Dispense Refill   • B Complex Vitamins (B COMPLEX 50) Tab Take 1 Tab by mouth 2 Times a Day.     • hydrOXYzine HCl (ATARAX) 25 MG Tab Take 0.5 Tabs by mouth 1 time daily as needed.     • Melatonin 10 MG Cap Take 1 Cap by mouth every day.     • citalopram (CELEXA) 10 MG tablet TAKE ONE TABLET BY " "MOUTH AT BEDTIME  90 Tab 1   • omeprazole (PRILOSEC) 20 MG delayed-release capsule TAKE ONE CAPSULE BY MOUTH ONE TIME DAILY  90 Cap 3   • Docusate Calcium (STOOL SOFTENER PO) Take 1 Tab by mouth 2 Times a Day.     • Cyanocobalamin (VITAMIN B-12) 1000 MCG Tab Take 1,000 mcg by mouth every day.     • acetaminophen (TYLENOL) 325 MG Tab Take 1.5-3 Tabs by mouth every 6 hours as needed. 30 Tab      No current facility-administered medications for this visit.        Allergies as of 2018   • (No Known Allergies)       Social History     Social History   • Marital status:      Spouse name: N/A   • Number of children: N/A   • Years of education: N/A     Occupational History   • Not on file.     Social History Main Topics   • Smoking status: Former Smoker     Types: Cigarettes     Quit date: 1970   • Smokeless tobacco: Never Used      Comment: 1/2 ppd 40 years; stopped ; still chews   • Alcohol use No   • Drug use: No   • Sexual activity: No     Other Topics Concern   • Not on file     Social History Narrative    Lives in [facility] [facility].      [facility].     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    ROS:   Diff to obtain 2/2 memory impairment   No CP, heart palp  No dizziness  No SOB  Anxiety ok.   /54   Pulse 66   Temp 36.6 °C (97.8 °F)   Ht 1.575 m (5' 2\")   Wt 52.7 kg (116 lb 2 oz)   SpO2 94%   BMI 21.24 kg/m²     Physical Exam  General:  Alert and oriented.  No apparent distress.    Eyes: No scleral icterus. No conjunctival injection.  PERRL    Ears:  [place]    ENMT: L lateral  nose mishapen with new subcm scab    Moist mucous membranes. Oropharynx clear. No erythema or exudates noted.     Neck: Supple. Trachea midline.    Resp: Clear to auscultation and percussion bilaterally. No rales, rhonchi, or wheezes.    Cardiovascular: Regular " rate and normal rhythm. No murmurs, rubs or gallops. 2+ radial  pulses.    Feet warm    Abdomen:     Musculoskeletal: No clubbing, cyanosis, edema.    Skin: Feet warm.  Toe nails well kept  No skin breakdown on feet    Lymph:     Neuro:     Psych: Mood euthymic. Affect congruent.      Other:     Labs/Studies  Hospital Outpatient Visit on 02/23/2018   Component Date Value Ref Range Status   • Vitamin B12 -True Cobalamin 02/23/2018 709  211 - 911 pg/mL Final   • Sodium 02/23/2018 138  135 - 145 mmol/L Final   • Potassium 02/23/2018 4.4  3.6 - 5.5 mmol/L Final   • Chloride 02/23/2018 102  96 - 112 mmol/L Final   • Co2 02/23/2018 27  20 - 33 mmol/L Final   • Glucose 02/23/2018 105* 65 - 99 mg/dL Final   • Bun 02/23/2018 32* 8 - 22 mg/dL Final   • Creatinine 02/23/2018 1.96* 0.50 - 1.40 mg/dL Final   • Calcium 02/23/2018 9.1  8.5 - 10.5 mg/dL Final   • Anion Gap 02/23/2018 9.0  0.0 - 11.9 Final   • WBC 02/23/2018 9.7  4.8 - 10.8 K/uL Final   • RBC 02/23/2018 3.99* 4.70 - 6.10 M/uL Final   • Hemoglobin 02/23/2018 14.0  14.0 - 18.0 g/dL Final   • Hematocrit 02/23/2018 42.0  42.0 - 52.0 % Final   • MCV 02/23/2018 105.3* 81.4 - 97.8 fL Final   • MCH 02/23/2018 35.1* 27.0 - 33.0 pg Final   • MCHC 02/23/2018 33.3* 33.7 - 35.3 g/dL Final   • RDW 02/23/2018 55.5* 35.9 - 50.0 fL Final   • Platelet Count 02/23/2018 162* 164 - 446 K/uL Final   • MPV 02/23/2018 9.3  9.0 - 12.9 fL Final   • Neutrophils-Polys 02/23/2018 48.00  44.00 - 72.00 % Final   • Lymphocytes 02/23/2018 42.40* 22.00 - 41.00 % Final   • Monocytes 02/23/2018 8.40  0.00 - 13.40 % Final   • Eosinophils 02/23/2018 0.80  0.00 - 6.90 % Final   • Basophils 02/23/2018 0.20  0.00 - 1.80 % Final   • Immature Granulocytes 02/23/2018 0.20  0.00 - 0.90 % Final   • Nucleated RBC 02/23/2018 0.00  /100 WBC Final   • Neutrophils (Absolute) 02/23/2018 4.67  1.82 - 7.42 K/uL Final   • Lymphs (Absolute) 02/23/2018 4.13  1.00 - 4.80 K/uL Final   • Monos (Absolute) 02/23/2018 0.82   0.00 - 0.85 K/uL Final   • Eos (Absolute) 02/23/2018 0.08  0.00 - 0.51 K/uL Final   • Baso (Absolute) 02/23/2018 0.02  0.00 - 0.12 K/uL Final   • Immature Granulocytes (abs) 02/23/2018 0.02  0.00 - 0.11 K/uL Final   • NRBC (Absolute) 02/23/2018 0.00  K/uL Final   • Prostatic Specific Antigen Tot 02/23/2018 132.48* 0.00 - 4.00 ng/mL Final    Comment: The Access Hybritech PSA assay is a paramagnetic particle,  chemiluminescent immunoassay for the quantitative determination  of total prostate specific antigen (PSA) levels using the  Access Immunoassay System. Values obtained with different  methods cannot be used interchangeably for patient monitoring.     • GFR If  02/23/2018 39* >60 mL/min/1.73 m 2 Final   • GFR If Non  02/23/2018 32* >60 mL/min/1.73 m 2 Final   Hospital Outpatient Visit on 01/23/2018   Component Date Value Ref Range Status   • Phosphorus 01/23/2018 3.9  2.5 - 4.5 mg/dL Final   • Color 01/23/2018 Yellow   Final   • Character 01/23/2018 Clear   Final   • Specific Gravity 01/23/2018 1.013  <1.035 Final   • Ph 01/23/2018 6.0  5.0 - 8.0 Final   • Glucose 01/23/2018 Negative  Negative mg/dL Final   • Ketones 01/23/2018 Negative  Negative mg/dL Final   • Protein 01/23/2018 100* Negative mg/dL Final   • Bilirubin 01/23/2018 Negative  Negative Final   • Urobilinogen, Urine 01/23/2018 0.2  Negative Final   • Nitrite 01/23/2018 Negative  Negative Final   • Leukocyte Esterase 01/23/2018 Negative  Negative Final   • Occult Blood 01/23/2018 Negative  Negative Final   • Micro Urine Req 01/23/2018 Microscopic   Final   • WBC 01/23/2018 11.0* 4.8 - 10.8 K/uL Final   • RBC 01/23/2018 4.16* 4.70 - 6.10 M/uL Final   • Hemoglobin 01/23/2018 13.8* 14.0 - 18.0 g/dL Final   • Hematocrit 01/23/2018 43.2  42.0 - 52.0 % Final   • MCV 01/23/2018 103.8* 81.4 - 97.8 fL Final   • MCH 01/23/2018 33.2* 27.0 - 33.0 pg Final   • MCHC 01/23/2018 31.9* 33.7 - 35.3 g/dL Final   • RDW 01/23/2018 53.9* 35.9  - 50.0 fL Final   • Platelet Count 01/23/2018 189  164 - 446 K/uL Final   • MPV 01/23/2018 9.1  9.0 - 12.9 fL Final   • Neutrophils-Polys 01/23/2018 48.90  44.00 - 72.00 % Final   • Lymphocytes 01/23/2018 45.50* 22.00 - 41.00 % Final   • Monocytes 01/23/2018 4.40  0.00 - 13.40 % Final   • Eosinophils 01/23/2018 0.60  0.00 - 6.90 % Final   • Basophils 01/23/2018 0.30  0.00 - 1.80 % Final   • Immature Granulocytes 01/23/2018 0.30  0.00 - 0.90 % Final   • Nucleated RBC 01/23/2018 0.00  /100 WBC Final   • Neutrophils (Absolute) 01/23/2018 5.40  1.82 - 7.42 K/uL Final   • Lymphs (Absolute) 01/23/2018 5.02* 1.00 - 4.80 K/uL Final   • Monos (Absolute) 01/23/2018 0.48  0.00 - 0.85 K/uL Final   • Eos (Absolute) 01/23/2018 0.07  0.00 - 0.51 K/uL Final   • Baso (Absolute) 01/23/2018 0.03  0.00 - 0.12 K/uL Final   • Immature Granulocytes (abs) 01/23/2018 0.03  0.00 - 0.11 K/uL Final   • NRBC (Absolute) 01/23/2018 0.00  K/uL Final   • Pth, Intact 01/23/2018 145.7* 14.0 - 72.0 pg/mL Final   • Calcium 01/23/2018 9.2  8.5 - 10.5 mg/dL Final   • Sodium 01/23/2018 140  135 - 145 mmol/L Final   • Potassium 01/23/2018 4.4  3.6 - 5.5 mmol/L Final   • Chloride 01/23/2018 103  96 - 112 mmol/L Final   • Co2 01/23/2018 33  20 - 33 mmol/L Final   • Anion Gap 01/23/2018 4.0  0.0 - 11.9 Final   • Glucose 01/23/2018 124* 65 - 99 mg/dL Final   • Bun 01/23/2018 37* 8 - 22 mg/dL Final   • Creatinine 01/23/2018 2.02* 0.50 - 1.40 mg/dL Final   • AST(SGOT) 01/23/2018 10* 12 - 45 U/L Final   • ALT(SGPT) 01/23/2018 11  2 - 50 U/L Final   • Alkaline Phosphatase 01/23/2018 102* 30 - 99 U/L Final   • Total Bilirubin 01/23/2018 0.9  0.1 - 1.5 mg/dL Final   • Albumin 01/23/2018 4.1  3.2 - 4.9 g/dL Final   • Total Protein 01/23/2018 6.9  6.0 - 8.2 g/dL Final   • Globulin 01/23/2018 2.8  1.9 - 3.5 g/dL Final   • A-G Ratio 01/23/2018 1.5  g/dL Final   • WBC 01/23/2018 0-2* /hpf Final    Comment: Female  <12 Yr 0-2  >12 Yr 0-5  Male   None     • RBC 01/23/2018  0-2* /hpf Final    Comment: Female  >12 Yr 0-2  Male   None     • Bacteria 01/23/2018 Negative  None /hpf Final   • Epithelial Cells 01/23/2018 Negative  /hpf Final   • Hyaline Cast 01/23/2018 0-2  /lpf Final   • GFR If  01/23/2018 37* >60 mL/min/1.73 m 2 Final   • GFR If Non  01/23/2018 31* >60 mL/min/1.73 m 2 Final         Oct 2017    CRN 1.71  B12 200s  WBC 9.5    Spring 2017  PSA 40s    Oct 2017  MRI brain without contrast  No mass    Assessment and Plan  1. Anxiety  2. Insomnia, unspecified type  Doing better on SSRI  Very occ takes - hydrOXYzine HCl (ATARAX) 25 MG Tab; Take 0.5 Tabs by mouth 1 time daily as needed.  Also on 10mg of melatonin (went down from 45mg)     3. Elevated PSA  Presume prostate cancer (CMS-HCC)  Wants to trend PSA but not interested in treatment -- consider stopping checking PSA given pt not going to get treated and doing well; wants to focus on QOL  - Denies LUTS at this time - in past failed tamsulosin 2/2 GI upset    Anemia, unspecified type  Low grade at times and stable  Watch     4. Leukocytosis, unspecified type  Presumed CLL (chronic lymphocytic leukemia) (CMS-HCC)   given smudge cells in past  Watch - CBC WITH DIFFERENTIAL; Future    5. Vitamin B deficiency  On B12    6. CKD (chronic kidney disease) stage 4, GFR 15-29 ml/min (CMS-HCC)  Stable   Monitor   Avoid nephrotoxic agents  Renally dose medications   Sees renal    7. Transient disorientation  1 episode  No other s/sx and back to normal  Expectant management    Benign essential HTN  Off meds    Hyponatremia  watch    Cognitive impairment  Become more of an issue since hosp stay but getting better  Progression of presumed dementia given functional impairment  Supportive care for now  Tx B12  Tx anxiety    Gait instability -- still an issue  Imbalance -- not an issue now  Light headedness -- not an issue now  In past, when able to get more of history, seemed to be having 2 processes at  least going on  1. The imbalance is related to decreased muscle strength/frailty and possibly meds  2. Occ he will get LH which will make imbalance worse  EKG done in past was NSR with no arrythmia  Orthostatics were negative in past but there could still be a component as he gets LH with standing  Defer further work up as burden of care too great given GOC  No recent falls  occ unsteady on feet  Declines PT -- will think about it  rec cane    History of skin cancer  Squamous cell carcinoma of nose  S/p rads with marked improvement clinically   Needs to f/u with derm as it looks like it might be recurring    Chronic nonintractable headache, unspecified headache type  Long standing but maybe worse -- not an issue recently   Had brain MRI without contrast that was negative for mass  Will hold off on brain MRI with contrast given GOC  Step son thinks it's related to stress   Controlled with Tylenol     Onychomycosis  Son doing foot care    Arthralgia, unspecified joint  rec Tylenol renally dosed over NSAIDs  - acetaminophen (TYLENOL) 325 MG Tab; Take 2 Tabs by mouth every 6 hours as needed.    Gastroesophageal reflux disease, esophagitis presence not specified  Stable on PPI     Health maintenance   See immunizations   Got Prevnar and Pneumovax  TDAP UTD  Could get shingles shot  Flu shot 2017  Consider DEXA given fall and fracture - declines  Defer Ca screenings given age and comorbidities    ACP  Pt is DNR/DNI - limited med intervention ok.  Completed POLST Spring/Summer 2017.    Open to treating issues that are easily treatable with minimal burden of care.   Wants to avoid burdensome care.  Doesn't want to eval or treat for possible cancer (prostate and CLL) -- wants to focus on QOL.     Patient Instructions   Make sure you have follow up with derm.      Stay the course.     I recommend the Shingrix vaccine series to prevent shingles.  Please get the vaccines at a local pharmacy.        I spent over 30 minutes in  face-to-face time with this patient and/or family and/or friends of which > 50% was devoted to counseling.  Topics included GOC, bp, ckd, elevated PSA, blood counts, mood and sleep.    Follow-up  Return in about 3 months (around 6/1/2018).    Signed by: Stacy Cm M.D.

## 2018-03-01 NOTE — PATIENT INSTRUCTIONS
Make sure you have follow up with derm.      Stay the course.     I recommend the Shingrix vaccine series to prevent shingles.  Please get the vaccines at a local pharmacy.

## 2018-03-12 ENCOUNTER — APPOINTMENT (RX ONLY)
Dept: URBAN - METROPOLITAN AREA CLINIC 20 | Facility: CLINIC | Age: 83
Setting detail: DERMATOLOGY
End: 2018-03-12

## 2018-03-12 DIAGNOSIS — D18.0 HEMANGIOMA: ICD-10-CM

## 2018-03-12 DIAGNOSIS — L81.4 OTHER MELANIN HYPERPIGMENTATION: ICD-10-CM

## 2018-03-12 DIAGNOSIS — L82.1 OTHER SEBORRHEIC KERATOSIS: ICD-10-CM

## 2018-03-12 DIAGNOSIS — D22 MELANOCYTIC NEVI: ICD-10-CM

## 2018-03-12 DIAGNOSIS — L57.8 OTHER SKIN CHANGES DUE TO CHRONIC EXPOSURE TO NONIONIZING RADIATION: ICD-10-CM

## 2018-03-12 PROBLEM — D18.01 HEMANGIOMA OF SKIN AND SUBCUTANEOUS TISSUE: Status: ACTIVE | Noted: 2018-03-12

## 2018-03-12 PROBLEM — C44.311 BASAL CELL CARCINOMA OF SKIN OF NOSE: Status: ACTIVE | Noted: 2018-03-12

## 2018-03-12 PROBLEM — Z85.828 PERSONAL HISTORY OF OTHER MALIGNANT NEOPLASM OF SKIN: Status: ACTIVE | Noted: 2018-03-12

## 2018-03-12 PROBLEM — D22.5 MELANOCYTIC NEVI OF TRUNK: Status: ACTIVE | Noted: 2018-03-12

## 2018-03-12 PROCEDURE — ? ADDITIONAL NOTES

## 2018-03-12 PROCEDURE — ? COUNSELING

## 2018-03-12 PROCEDURE — 99214 OFFICE O/P EST MOD 30 MIN: CPT

## 2018-03-12 PROCEDURE — ? PHOTO-DOCUMENTATION

## 2018-03-12 ASSESSMENT — LOCATION DETAILED DESCRIPTION DERM
LOCATION DETAILED: RIGHT ULNAR DORSAL HAND
LOCATION DETAILED: LEFT SUPERIOR ANTERIOR NECK
LOCATION DETAILED: RIGHT INFERIOR CENTRAL MALAR CHEEK
LOCATION DETAILED: RIGHT SUPERIOR MEDIAL UPPER BACK
LOCATION DETAILED: LEFT ULNAR DORSAL HAND
LOCATION DETAILED: RIGHT SUPERIOR UPPER BACK

## 2018-03-12 ASSESSMENT — LOCATION SIMPLE DESCRIPTION DERM
LOCATION SIMPLE: LEFT HAND
LOCATION SIMPLE: RIGHT UPPER BACK
LOCATION SIMPLE: RIGHT CHEEK
LOCATION SIMPLE: LEFT ANTERIOR NECK
LOCATION SIMPLE: RIGHT HAND

## 2018-03-12 ASSESSMENT — LOCATION ZONE DERM
LOCATION ZONE: NECK
LOCATION ZONE: HAND
LOCATION ZONE: TRUNK
LOCATION ZONE: FACE

## 2018-03-12 NOTE — PROCEDURE: ADDITIONAL NOTES
Additional Notes: Patient states he is 89 and does not want surgery or radiation.  He is ok if we observe area.

## 2018-03-12 NOTE — HPI: SKIN LESION (BASAL CELL CARCINOMA)
Is This A New Presentation, Or A Follow-Up?: Skin Lesion
Location From Outside Provider (Will Override Previously Chosen Location): Left nasal ala

## 2018-04-06 ENCOUNTER — PATIENT OUTREACH (OUTPATIENT)
Dept: HEALTH INFORMATION MANAGEMENT | Facility: OTHER | Age: 83
End: 2018-04-06

## 2018-04-06 NOTE — PROGRESS NOTES
1. Attempt #: FINAL    2. [facility] Verified: no    3. Verify PCP: yes    4. Care Team Updated:       •   DME Company (gait device, O2, CPAP, etc.): NO       •   Other Specialists (eye doctor, derm, GYN, cardiology, endo, etc): YES    5.  Reviewed/Updated the following with patient:       •   Communication Preference Obtained? YES       •   Preferred Pharmacy? YES       •   Preferred Lab? YES       •   Family History (document living status of immediate family members and if + hx of cancer, diabetes, hypertension, hyperlipidemia, heart attack, stroke) NO/ SPOKE TO PT'S STEP SON RAIN    6. [employer] Activation: declined    7. [employer] Anay: no    8. Annual Wellness Visit Scheduling  Scheduling Status:Not Scheduled. Patient states they are not interested/RAIN STATED THAT PT SEES PCP EVERY 4 MONTHS.      9. Care Gap Scheduling (Attempt to Schedule EACH Overdue Care Gap!)     Health Maintenance Due   Topic Date Due   • Annual Wellness Visit  11/30/1921   • IMM ZOSTER VACCINE  11/30/1981

## 2018-06-01 ENCOUNTER — OFFICE VISIT (OUTPATIENT)
Dept: INTERNAL MEDICINE | Facility: MEDICAL CENTER | Age: 83
End: 2018-06-01
Payer: MEDICARE

## 2018-06-01 VITALS
TEMPERATURE: 97.4 F | HEIGHT: 62 IN | SYSTOLIC BLOOD PRESSURE: 124 MMHG | BODY MASS INDEX: 20.7 KG/M2 | OXYGEN SATURATION: 94 % | HEART RATE: 52 BPM | WEIGHT: 112.5 LBS | DIASTOLIC BLOOD PRESSURE: 60 MMHG

## 2018-06-01 DIAGNOSIS — C91.10 CLL (CHRONIC LYMPHOCYTIC LEUKEMIA) (HCC): ICD-10-CM

## 2018-06-01 DIAGNOSIS — E78.5 HYPERLIPIDEMIA, UNSPECIFIED HYPERLIPIDEMIA TYPE: ICD-10-CM

## 2018-06-01 DIAGNOSIS — F41.9 ANXIETY AND DEPRESSION: ICD-10-CM

## 2018-06-01 DIAGNOSIS — R41.89 IMPAIRED COGNITION: ICD-10-CM

## 2018-06-01 DIAGNOSIS — F32.A ANXIETY AND DEPRESSION: ICD-10-CM

## 2018-06-01 DIAGNOSIS — Z12.5 SCREENING FOR PROSTATE CANCER: ICD-10-CM

## 2018-06-01 DIAGNOSIS — R26.81 GAIT INSTABILITY: ICD-10-CM

## 2018-06-01 DIAGNOSIS — N18.4 STAGE 4 CHRONIC KIDNEY DISEASE (HCC): ICD-10-CM

## 2018-06-01 DIAGNOSIS — D72.829 LEUKOCYTOSIS, UNSPECIFIED TYPE: ICD-10-CM

## 2018-06-01 DIAGNOSIS — I10 BENIGN ESSENTIAL HTN: Primary | ICD-10-CM

## 2018-06-01 PROCEDURE — 99215 OFFICE O/P EST HI 40 MIN: CPT | Performed by: INTERNAL MEDICINE

## 2018-06-01 RX ORDER — LANOLIN ALCOHOL/MO/W.PET/CERES
3 CREAM (GRAM) TOPICAL
COMMUNITY

## 2018-06-01 RX ORDER — CITALOPRAM HYDROBROMIDE 10 MG/1
5 TABLET ORAL DAILY
Status: SHIPPED | DISCHARGE
Start: 2018-06-01 | End: 2018-10-19

## 2018-06-01 NOTE — PATIENT INSTRUCTIONS
Labs in October.  Come back in Oct.     Decrease Celexa 5mg daily.     Consider PT.     I recommend the Shingrix vaccine series to prevent shingles.  Please get the vaccines at a local pharmacy.

## 2018-06-01 NOTE — LETTER
[email or web address]  Stacy Cm M.D.  [address]  [place] [address]  Fax: [phone]   Authorization for Release/Disclosure of   Protected Health Information   Name: LUIS M ARCINIEGA : 1921 SSN: [id number]   Address: [address] [place] [address] [address]  [place] [address] Phone:    [phone] (home)    I authorize the entity listed below to release/disclose the PHI below to:   [email or web address]/Stacy Cm M.D. and Stacy Cm M.D.   Provider or Entity Name: [facility]     Address   City, State, Zip   Phone:[phone]      Fax:[phone]     Reason for request: continuity of care   Information to be released:    [  ] LAST COLONOSCOPY,  including any PATH REPORT and follow-up  [  ] LAST FIT/COLOGUARD RESULT [  ] LAST DEXA  [  ] LAST MAMMOGRAM  [  ] LAST PAP  [  ] LAST LABS [  ] RETINA EXAM REPORT  [  ] IMMUNIZATION RECORDS  [ X ] Release all info      [  ] Check here and initial the line next to each item to release ALL health information INCLUDING  _____ Care and treatment for drug and / or alcohol abuse  _____ HIV testing, infection status, or AIDS  _____ Genetic Testing    DATES OF SERVICE OR TIME PERIOD TO BE DISCLOSED: _____________  I understand and acknowledge that:  * This Authorization may be revoked at any time by you in writing, except if your health information has already been used or disclosed.  * Your health information that will be used or disclosed as a result of you signing this authorization could be re-disclosed by the recipient. If this occurs, your re-disclosed health information may no longer be protected by State or Federal laws.  * You may refuse to sign this Authorization. Your refusal will not affect your ability to obtain treatment.  * This Authorization becomes effective upon signing and will  on (date) __________.      If no date is indicated, this Authorization will  one (1) year from the signature date.    Name: Luis M Orellana  Bradley    Signature:   Date:     6/1/2018       PLEASE FAX REQUESTED RECORDS BACK TO: [phone]

## 2018-06-01 NOTE — PROGRESS NOTES
Follow-up     Chief Complaint   Patient presents with   • Difficulty Walking     instability.    • Medication Management     Discuss citalopram. 1/2 tablet?       HPI  History was obtained from the patient, melrin Roberson and a medical record review.      Memory gradually declining.   Son went out of town and pt needed reminding where son was.   Son sees him twice weekly.   CG walks dog and sees him daily.   No driving.  Able to ADLs.  Son helps with meds and finances.   Mood ok.  Off hydroxyzine.  On Celexa.     Pt reports gait instability.  Feels unsteady at times.        THREE CHRONIC CONDITIONS  CKD, stble  H/o asthma, stable  LUTS, stable    Past Medical History:   Diagnosis Date   • Cognitive impairment    • Fracture of left femur (HCC)    • GERD (gastroesophageal reflux disease)    • History of asthma    • Kidney disease     with 2/2 hyperPTH   • Lower urinary tract symptoms 7/7/2016   • Prostate CA (HCC)    • Rib fracture     personal history of fall on ice 2015   • Skin cancer        Past Surgical History:   Procedure Laterality Date   • APPENDECTOMY     • CATARACT EXTRACTION WITH IOL     • OTHER      nasal growth removal x 2   • OTHER ORTHOPEDIC SURGERY      L femur repair   • TONSILLECTOMY         Current Outpatient Prescriptions   Medication Sig Dispense Refill   • melatonin 3 MG Tab Take 3 mg by mouth every bedtime.     • citalopram (CELEXA) 10 MG tablet Take 0.5 Tabs by mouth every day.     • B Complex Vitamins (B COMPLEX 50) Tab Take 1 Tab by mouth 2 Times a Day.     • omeprazole (PRILOSEC) 20 MG delayed-release capsule TAKE ONE CAPSULE BY MOUTH ONE TIME DAILY  90 Cap 3   • Docusate Calcium (STOOL SOFTENER PO) Take 1 Tab by mouth 2 Times a Day.     • Cyanocobalamin (VITAMIN B-12) 1000 MCG Tab Take 1,000 mcg by mouth every day.     • acetaminophen (TYLENOL) 325 MG Tab Take 1.5-3 Tabs by mouth every 6 hours as needed. 30 Tab      No current facility-administered medications for this visit.        Allergies  "as of 2018   • (No Known Allergies)       Social History     Social History   • Marital status:      Spouse name: N/A   • Number of children: N/A   • Years of education: N/A     Occupational History   • Not on file.     Social History Main Topics   • Smoking status: Former Smoker     Types: Cigarettes     Quit date: 1970   • Smokeless tobacco: Never Used      Comment: 1/2 ppd 40 years; stopped ; still chews   • Alcohol use No   • Drug use: No   • Sexual activity: No     Other Topics Concern   • Not on file     Social History Narrative    Lives in [facility] alone.      [facility].     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    ROS:   Diff to obtain 2/2 memory impairment  No CP, heart palp  No dizziness or LH.   No SOB  Anxiety ok.   /60   Pulse (!) 52 Comment: Recheck Pulse: Pt laying. Took Manually  Temp 36.3 °C (97.4 °F)   Ht 1.575 m (5' 2\")   Wt 51 kg (112 lb 8 oz)   SpO2 94%   BMI 20.58 kg/m²     Physical Exam  General:  Alert and oriented.  No apparent distress.    Eyes: No scleral icterus. No conjunctival injection.  PERRL    Ears:  [place] IRA    ENMT: L lateral  nose mishapen with subcm scab    Moist mucous membranes. Oropharynx clear. No erythema or exudates noted.     Neck: Supple. Trachea midline.    Resp: Clear to auscultation and percussion bilaterally. No rales, rhonchi, or wheezes.    Cardiovascular: Regular rate and normal rhythm. No murmurs, rubs or gallops. 2+ radial  pulses.    Feet warm  2+ DP     Abdomen:     Musculoskeletal: No clubbing, cyanosis, edema.  Uses cane  Stance is wide  Steps symmetric    Skin: Feet warm.  Toe nails well kept  No skin breakdown on feet  SKs    Lymph:     Neuro:   5/5 strength in legs   intact sens to light touch  Could not check reflexes    Psych: Mood euthymic. Affect " congruent.      Other:     Labs/Studies  Hospital Outpatient Visit on 02/23/2018   Component Date Value Ref Range Status   • Vitamin B12 -True Cobalamin 02/23/2018 709  211 - 911 pg/mL Final   • Sodium 02/23/2018 138  135 - 145 mmol/L Final   • Potassium 02/23/2018 4.4  3.6 - 5.5 mmol/L Final   • Chloride 02/23/2018 102  96 - 112 mmol/L Final   • Co2 02/23/2018 27  20 - 33 mmol/L Final   • Glucose 02/23/2018 105* 65 - 99 mg/dL Final   • Bun 02/23/2018 32* 8 - 22 mg/dL Final   • Creatinine 02/23/2018 1.96* 0.50 - 1.40 mg/dL Final   • Calcium 02/23/2018 9.1  8.5 - 10.5 mg/dL Final   • Anion Gap 02/23/2018 9.0  0.0 - 11.9 Final   • WBC 02/23/2018 9.7  4.8 - 10.8 K/uL Final   • RBC 02/23/2018 3.99* 4.70 - 6.10 M/uL Final   • Hemoglobin 02/23/2018 14.0  14.0 - 18.0 g/dL Final   • Hematocrit 02/23/2018 42.0  42.0 - 52.0 % Final   • MCV 02/23/2018 105.3* 81.4 - 97.8 fL Final   • MCH 02/23/2018 35.1* 27.0 - 33.0 pg Final   • MCHC 02/23/2018 33.3* 33.7 - 35.3 g/dL Final   • RDW 02/23/2018 55.5* 35.9 - 50.0 fL Final   • Platelet Count 02/23/2018 162* 164 - 446 K/uL Final   • MPV 02/23/2018 9.3  9.0 - 12.9 fL Final   • Neutrophils-Polys 02/23/2018 48.00  44.00 - 72.00 % Final   • Lymphocytes 02/23/2018 42.40* 22.00 - 41.00 % Final   • Monocytes 02/23/2018 8.40  0.00 - 13.40 % Final   • Eosinophils 02/23/2018 0.80  0.00 - 6.90 % Final   • Basophils 02/23/2018 0.20  0.00 - 1.80 % Final   • Immature Granulocytes 02/23/2018 0.20  0.00 - 0.90 % Final   • Nucleated RBC 02/23/2018 0.00  /100 WBC Final   • Neutrophils (Absolute) 02/23/2018 4.67  1.82 - 7.42 K/uL Final   • Lymphs (Absolute) 02/23/2018 4.13  1.00 - 4.80 K/uL Final   • Monos (Absolute) 02/23/2018 0.82  0.00 - 0.85 K/uL Final   • Eos (Absolute) 02/23/2018 0.08  0.00 - 0.51 K/uL Final   • Baso (Absolute) 02/23/2018 0.02  0.00 - 0.12 K/uL Final   • Immature Granulocytes (abs) 02/23/2018 0.02  0.00 - 0.11 K/uL Final   • NRBC (Absolute) 02/23/2018 0.00  K/uL Final   •  Prostatic Specific Antigen Tot 02/23/2018 132.48* 0.00 - 4.00 ng/mL Final    Comment: The Access Hybritech PSA assay is a paramagnetic particle,  chemiluminescent immunoassay for the quantitative determination  of total prostate specific antigen (PSA) levels using the  Access Immunoassay System. Values obtained with different  methods cannot be used interchangeably for patient monitoring.     • GFR If  02/23/2018 39* >60 mL/min/1.73 m 2 Final   • GFR If Non  02/23/2018 32* >60 mL/min/1.73 m 2 Final   Hospital Outpatient Visit on 01/23/2018   Component Date Value Ref Range Status   • Phosphorus 01/23/2018 3.9  2.5 - 4.5 mg/dL Final   • Color 01/23/2018 Yellow   Final   • Character 01/23/2018 Clear   Final   • Specific Gravity 01/23/2018 1.013  <1.035 Final   • Ph 01/23/2018 6.0  5.0 - 8.0 Final   • Glucose 01/23/2018 Negative  Negative mg/dL Final   • Ketones 01/23/2018 Negative  Negative mg/dL Final   • Protein 01/23/2018 100* Negative mg/dL Final   • Bilirubin 01/23/2018 Negative  Negative Final   • Urobilinogen, Urine 01/23/2018 0.2  Negative Final   • Nitrite 01/23/2018 Negative  Negative Final   • Leukocyte Esterase 01/23/2018 Negative  Negative Final   • Occult Blood 01/23/2018 Negative  Negative Final   • Micro Urine Req 01/23/2018 Microscopic   Final   • WBC 01/23/2018 11.0* 4.8 - 10.8 K/uL Final   • RBC 01/23/2018 4.16* 4.70 - 6.10 M/uL Final   • Hemoglobin 01/23/2018 13.8* 14.0 - 18.0 g/dL Final   • Hematocrit 01/23/2018 43.2  42.0 - 52.0 % Final   • MCV 01/23/2018 103.8* 81.4 - 97.8 fL Final   • MCH 01/23/2018 33.2* 27.0 - 33.0 pg Final   • MCHC 01/23/2018 31.9* 33.7 - 35.3 g/dL Final   • RDW 01/23/2018 53.9* 35.9 - 50.0 fL Final   • Platelet Count 01/23/2018 189  164 - 446 K/uL Final   • MPV 01/23/2018 9.1  9.0 - 12.9 fL Final   • Neutrophils-Polys 01/23/2018 48.90  44.00 - 72.00 % Final   • Lymphocytes 01/23/2018 45.50* 22.00 - 41.00 % Final   • Monocytes 01/23/2018 4.40   0.00 - 13.40 % Final   • Eosinophils 01/23/2018 0.60  0.00 - 6.90 % Final   • Basophils 01/23/2018 0.30  0.00 - 1.80 % Final   • Immature Granulocytes 01/23/2018 0.30  0.00 - 0.90 % Final   • Nucleated RBC 01/23/2018 0.00  /100 WBC Final   • Neutrophils (Absolute) 01/23/2018 5.40  1.82 - 7.42 K/uL Final   • Lymphs (Absolute) 01/23/2018 5.02* 1.00 - 4.80 K/uL Final   • Monos (Absolute) 01/23/2018 0.48  0.00 - 0.85 K/uL Final   • Eos (Absolute) 01/23/2018 0.07  0.00 - 0.51 K/uL Final   • Baso (Absolute) 01/23/2018 0.03  0.00 - 0.12 K/uL Final   • Immature Granulocytes (abs) 01/23/2018 0.03  0.00 - 0.11 K/uL Final   • NRBC (Absolute) 01/23/2018 0.00  K/uL Final   • Pth, Intact 01/23/2018 145.7* 14.0 - 72.0 pg/mL Final   • Calcium 01/23/2018 9.2  8.5 - 10.5 mg/dL Final   • Sodium 01/23/2018 140  135 - 145 mmol/L Final   • Potassium 01/23/2018 4.4  3.6 - 5.5 mmol/L Final   • Chloride 01/23/2018 103  96 - 112 mmol/L Final   • Co2 01/23/2018 33  20 - 33 mmol/L Final   • Anion Gap 01/23/2018 4.0  0.0 - 11.9 Final   • Glucose 01/23/2018 124* 65 - 99 mg/dL Final   • Bun 01/23/2018 37* 8 - 22 mg/dL Final   • Creatinine 01/23/2018 2.02* 0.50 - 1.40 mg/dL Final   • AST(SGOT) 01/23/2018 10* 12 - 45 U/L Final   • ALT(SGPT) 01/23/2018 11  2 - 50 U/L Final   • Alkaline Phosphatase 01/23/2018 102* 30 - 99 U/L Final   • Total Bilirubin 01/23/2018 0.9  0.1 - 1.5 mg/dL Final   • Albumin 01/23/2018 4.1  3.2 - 4.9 g/dL Final   • Total Protein 01/23/2018 6.9  6.0 - 8.2 g/dL Final   • Globulin 01/23/2018 2.8  1.9 - 3.5 g/dL Final   • A-G Ratio 01/23/2018 1.5  g/dL Final   • WBC 01/23/2018 0-2* /hpf Final    Comment: Female  <12 Yr 0-2  >12 Yr 0-5  Male   None     • RBC 01/23/2018 0-2* /hpf Final    Comment: Female  >12 Yr 0-2  Male   None     • Bacteria 01/23/2018 Negative  None /hpf Final   • Epithelial Cells 01/23/2018 Negative  /hpf Final   • Hyaline Cast 01/23/2018 0-2  /lpf Final   • GFR If  01/23/2018 37* >60  mL/min/1.73 m 2 Final   • GFR If Non  01/23/2018 31* >60 mL/min/1.73 m 2 Final         Oct 2017    CRN 1.71  B12 200s  WBC 9.5    Spring 2017  PSA 40s    Oct 2017  MRI brain without contrast  No mass    Assessment and Plan  1. Gait instability  Light headedness -- not an issue now  In past, when able to get more of history, seemed to be having 2 processes at least going on  1. The imbalance/instability is related to decreased muscle strength/frailty and possibly meds  2. Occ he will get LH which will make imbalance worse  EKG done in past was NSR with no arrhythmia  Orthostatics were negative in past and today but there could still be a component as he gets LH with standing  Defer further work up as burden of care too great given C  No recent falls  Persistent in last year  Today discussed   Explained to son how I was hopeful it was debility; however, in theory worst case scenario, it could be prostate cancer met to bone/cord; think that is unlikely as pt without other sxs/signs (good strength, no b/b incont)  Will take a more conservative route given C  Will do PT and watch   - ORTHOSTATIC BLOOD PRESSURE  - REFERRAL TO PHYSICAL THERAPY Reason for Therapy: Eval/Treat/Report    2. Stage 4 chronic kidney disease (HCC)  Stable   Monitor   Avoid nephrotoxic agents  Renally dose medications   - COMP METABOLIC PANEL; Future  - CBC WITH DIFFERENTIAL; Future  - VITAMIN D,25 HYDROXY; Future    3. Benign essential HTN  Ok off meds  - COMP METABOLIC PANEL; Future  - CBC WITH DIFFERENTIAL; Future  - TSH WITH REFLEX TO FT4; Future    4. Hyperlipidemia, unspecified hyperlipidemia type  Watch   - COMP METABOLIC PANEL; Future  - LIPID PROFILE; Future    5. Anxiety and depression  Doing ok on Celexa only   Will cut dose in half given theoretic r/o falls with SSRI  Avoid hydroxyzine (son to take away from pt; if anxiety gets worse on low dose Celexa, then, will increase Celexa to previous dose)  - COMP  METABOLIC PANEL; Future  - CBC WITH DIFFERENTIAL; Future  - TSH WITH REFLEX TO FT4; Future  - VITAMIN B12; Future  - FOLATE; Future  - citalopram (CELEXA) 10 MG tablet; Take 0.5 Tabs by mouth every day.    6. Impaired cognition  Become more of an issue in last year  Progression of presumed dementia given functional impairment  Supportive care for now  Tx B12  Tx anxiety    7. Screening for prostate cancer  Presumed prostate Ca  Wants to trend PSA but not interested in treatment   Denies LUTS at this time - in past failed tamsulosin 2/2 GI upset  Watch   - PROSTATE SPECIFIC AG SCREENING; Future    8. CLL (chronic lymphocytic leukemia) (HCC) presumed with smudge cells in past  9. Leukocytosis, unspecified type  Watch   Declines treatment  - CBC WITH DIFFERENTIAL; Future    Anemia, unspecified type  Low grade at times and stable  Watch     Vitamin B deficiency  On B12    History of skin cancer  Squamous cell carcinoma of nose  S/p rads with marked improvement clinically   Sees derm    Chronic nonintractable headache, unspecified headache type  Long standing but maybe worse -- not an issue recently   Had brain MRI without contrast that was negative for mass  Will hold off on brain MRI with contrast given GOC  Step son thinks it's related to stress   Controlled with Tylenol     Onychomycosis  Son doing foot care    Arthralgia, unspecified joint  rec Tylenol renally dosed over NSAIDs  - acetaminophen (TYLENOL) 325 MG Tab; Take 2 Tabs by mouth every 6 hours as needed.    Gastroesophageal reflux disease, esophagitis presence not specified  Stable on PPI     Health maintenance   See immunizations   Got Prevnar and Pneumovax  TDAP UTD  Could get shingles shot  Flu shot 2017  Consider DEXA given fall and fracture - declines  Defer Ca screenings given age and comorbidities    ACP  Pt is DNR/DNI - limited med intervention ok.  Completed POLST Spring/Summer 2017.    Open to treating issues that are easily treatable with minimal  burden of care.   Wants to avoid burdensome care.  Doesn't want to eval or treat for possible cancer (prostate and CLL) -- wants to focus on QOL.     Patient Instructions   Labs in October.  Come back in Oct.     Decrease Celexa 5mg daily.     Consider PT.     I recommend the Shingrix vaccine series to prevent shingles.  Please get the vaccines at a local pharmacy.        I spent over 40 minutes in face-to-face time with this patient and/or family and/or friends of which > 50% was devoted to counseling.  Topics included balance, meds, memory  .  Follow-up  Return in about 4 months (around 10/1/2018).    Signed by: Stacy Cm M.D.

## 2018-09-10 ENCOUNTER — APPOINTMENT (RX ONLY)
Dept: URBAN - METROPOLITAN AREA CLINIC 20 | Facility: CLINIC | Age: 83
Setting detail: DERMATOLOGY
End: 2018-09-10

## 2018-09-10 PROBLEM — C44.311 BASAL CELL CARCINOMA OF SKIN OF NOSE: Status: ACTIVE | Noted: 2018-09-10

## 2018-09-10 PROCEDURE — ? COUNSELING

## 2018-09-10 PROCEDURE — 99213 OFFICE O/P EST LOW 20 MIN: CPT

## 2018-09-10 PROCEDURE — ? ADDITIONAL NOTES

## 2018-09-10 NOTE — HPI: SKIN LESION (BASAL CELL CARCINOMA)
How Severe Is Your Skin Cancer?: moderate
Is This A New Presentation, Or A Follow-Up?: Follow Up Basal Cell Carcinoma
Additional History: Pt has his nephew with him today.

## 2018-09-10 NOTE — PROCEDURE: ADDITIONAL NOTES
Detail Level: Simple
Additional Notes: Area has increased in size, discussed all treatment options with pt.\\n\\nPt agrees to IL injection, in hopes to shrink, understands may not be 100% effective. \\n\\nSchedule appt with Dr. Liao for chemo injections.

## 2018-09-26 ENCOUNTER — APPOINTMENT (RX ONLY)
Dept: URBAN - METROPOLITAN AREA CLINIC 36 | Facility: CLINIC | Age: 83
Setting detail: DERMATOLOGY
End: 2018-09-26

## 2018-09-26 PROBLEM — C44.91 BASAL CELL CARCINOMA OF SKIN, UNSPECIFIED: Status: ACTIVE | Noted: 2018-09-26

## 2018-09-26 PROCEDURE — 11900 INJECT SKIN LESIONS </W 7: CPT

## 2018-09-26 PROCEDURE — ? INJECTION

## 2018-09-26 NOTE — PROCEDURE: INJECTION
Dose Administered (Numbers Only - Mg, G, Mcg, Units, Cc): 0.4
Procedure Information: Please note that the numeric value listed in the Medication (1) and associated J-code units and Medication (2) and associated J-code units variables are j-code amounts and do not represent either the concentration or the total amount of the medications injected.  I strongly recommend selecting no to the Render J-code information in note question. This will allow your note to be more clear. If you are billing j-codes with your injection codes you need to document the total amount of the medication injected. This amount should match the j-code units. For example, if you are injecting Triamcinolone 40mg as an intramuscular injection you would select 40 for the dose field and mg for the units. This would allow you to document  with 4 units (40mg = 10mg x 4). The total volume is not used to calculate j-codes only the amount of the medication administered.
units
Detail Level: None
Route: IL
Post-Care Instructions: I reviewed with the patient in detail post-care instructions. Patient understands to keep the injection sites clean and call the clinic if there is any redness, swelling or pain.
Treatment Number: 1
Total Volume Injected In Cc (Will Not Affected Billing): .2
Consent: The risks of the medication was reviewed with the patient.
Additional Comments: Previous path number [id number]. Patient previously treated with erividge and radiation at site. Due to patient’s age he does not want any more surgery or radiation treatments
Dose Administered (Numbers Only - Mg, G, Mcg, Units, Cc): 0
Bill J-Code: yes
Medication (1) And Associated J-Code Units: Bleomycin, 15 units
Units: mg

## 2018-10-04 ENCOUNTER — TELEPHONE (OUTPATIENT)
Dept: INTERNAL MEDICINE | Facility: MEDICAL CENTER | Age: 83
End: 2018-10-04

## 2018-10-19 ENCOUNTER — OFFICE VISIT (OUTPATIENT)
Dept: INTERNAL MEDICINE | Facility: MEDICAL CENTER | Age: 83
End: 2018-10-19
Payer: MEDICARE

## 2018-10-19 VITALS
OXYGEN SATURATION: 95 % | DIASTOLIC BLOOD PRESSURE: 62 MMHG | WEIGHT: 113 LBS | BODY MASS INDEX: 20.8 KG/M2 | HEIGHT: 62 IN | SYSTOLIC BLOOD PRESSURE: 152 MMHG | TEMPERATURE: 97.6 F | HEART RATE: 58 BPM

## 2018-10-19 DIAGNOSIS — Z23 NEED FOR IMMUNIZATION AGAINST INFLUENZA: ICD-10-CM

## 2018-10-19 DIAGNOSIS — M25.50 ARTHRALGIA, UNSPECIFIED JOINT: ICD-10-CM

## 2018-10-19 DIAGNOSIS — K21.9 GASTROESOPHAGEAL REFLUX DISEASE, ESOPHAGITIS PRESENCE NOT SPECIFIED: ICD-10-CM

## 2018-10-19 DIAGNOSIS — F32.A ANXIETY AND DEPRESSION: ICD-10-CM

## 2018-10-19 DIAGNOSIS — K59.00 CONSTIPATION, UNSPECIFIED CONSTIPATION TYPE: ICD-10-CM

## 2018-10-19 DIAGNOSIS — R25.2 LEG CRAMPS: ICD-10-CM

## 2018-10-19 DIAGNOSIS — B35.1 ONYCHOMYCOSIS: ICD-10-CM

## 2018-10-19 DIAGNOSIS — Z79.899 POLYPHARMACY: ICD-10-CM

## 2018-10-19 DIAGNOSIS — F41.9 ANXIETY AND DEPRESSION: ICD-10-CM

## 2018-10-19 DIAGNOSIS — R26.9 GAIT DIFFICULTY: ICD-10-CM

## 2018-10-19 DIAGNOSIS — R41.89 IMPAIRED COGNITION: ICD-10-CM

## 2018-10-19 PROCEDURE — G0008 ADMIN INFLUENZA VIRUS VAC: HCPCS | Performed by: INTERNAL MEDICINE

## 2018-10-19 PROCEDURE — 90662 IIV NO PRSV INCREASED AG IM: CPT | Performed by: INTERNAL MEDICINE

## 2018-10-19 PROCEDURE — 99214 OFFICE O/P EST MOD 30 MIN: CPT | Mod: 25 | Performed by: INTERNAL MEDICINE

## 2018-10-19 RX ORDER — ACETAMINOPHEN 325 MG/1
325 TABLET ORAL EVERY 6 HOURS PRN
Qty: 30 TAB | COMMUNITY
Start: 2018-10-19

## 2018-10-19 RX ORDER — CITALOPRAM HYDROBROMIDE 10 MG/1
10 TABLET ORAL DAILY
Qty: 30 TAB | Status: SHIPPED | DISCHARGE
Start: 2018-10-19 | End: 2018-10-22
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
## 2018-10-19 NOTE — NON-PROVIDER
"Emmanuel Huerta is a 89 y.o. male here for a non-provider visit for:   FLU    Reason for immunization: Annual Flu Vaccine  Immunization records indicate need for vaccine: Yes, confirmed with Epic  Minimum interval has been met for this vaccine: Yes  ABN completed: Not Indicated    Order and dose verified by: CARITO  VIS Dated  08/07/15 was given to patient: Yes  All IAC Questionnaire questions were answered \"No.\"    Patient tolerated injection and no adverse effects were observed or reported: Yes    Pt scheduled for next dose in series: Not Indicated    "

## 2018-10-19 NOTE — PATIENT INSTRUCTIONS
Get labs.     Stop B complex.  Take B12 for now.   Ok to take Celexa 10mg for now.   Clarify stool softener name and dose.     I recommend the Shingrix vaccine series to prevent shingles.  Please get the vaccines at a local pharmacy.        Use the walker.   Consider PT.     See dermatology.    See podiatry.    [address]  [place] [id number]  [phone]  ELIDIA LAWRENCE

## 2018-10-20 DIAGNOSIS — F41.9 ANXIETY: ICD-10-CM

## 2018-10-22 RX ORDER — CITALOPRAM HYDROBROMIDE 10 MG/1
TABLET ORAL
Qty: 90 TAB | Refills: 0 | Status: SHIPPED | OUTPATIENT
Start: 2018-10-22 | End: 2019-02-02 | Stop reason: SDUPTHER

## 2018-10-22 NOTE — TELEPHONE ENCOUNTER
Was the patient seen in the last year in this department? Yes   Last seen: 10/19/18 by Dr. Cm  Next appt: 11/27/18 with Dr. Cm      Does patient have an active prescription for medications requested? Yes-It was just filled for a 30 day supply on 10/19/18    Received Request Via: Pharmacy

## 2018-10-24 ENCOUNTER — APPOINTMENT (RX ONLY)
Dept: URBAN - METROPOLITAN AREA CLINIC 36 | Facility: CLINIC | Age: 83
Setting detail: DERMATOLOGY
End: 2018-10-24

## 2018-10-24 PROBLEM — C44.311 BASAL CELL CARCINOMA OF SKIN OF NOSE: Status: ACTIVE | Noted: 2018-10-24

## 2018-10-24 PROCEDURE — 11900 INJECT SKIN LESIONS </W 7: CPT

## 2018-10-24 PROCEDURE — ? INJECTION

## 2018-10-24 NOTE — PROCEDURE: INJECTION
Route: IL
Units: mg
Dose Administered (Numbers Only - Mg, G, Mcg, Units, Cc): 0
units
Bill J-Code: yes
Procedure Information: Please note that the numeric value listed in the Medication (1) and associated J-code units and Medication (2) and associated J-code units variables are j-code amounts and do not represent either the concentration or the total amount of the medications injected.  I strongly recommend selecting no to the Render J-code information in note question. This will allow your note to be more clear. If you are billing j-codes with your injection codes you need to document the total amount of the medication injected. This amount should match the j-code units. For example, if you are injecting Triamcinolone 40mg as an intramuscular injection you would select 40 for the dose field and mg for the units. This would allow you to document  with 4 units (40mg = 10mg x 4). The total volume is not used to calculate j-codes only the amount of the medication administered.
Medication (1) And Associated J-Code Units: Bleomycin, 15 units
Treatment Number: 2
Total Volume Injected In Cc (Will Not Affected Billing): .2
Consent: The risks of the medication was reviewed with the patient.
Detail Level: Detailed
Post-Care Instructions: I reviewed with the patient in detail post-care instructions. Patient understands to keep the injection sites clean and call the clinic if there is any redness, swelling or pain.

## 2019-01-09 ENCOUNTER — HOSPITAL ENCOUNTER (OUTPATIENT)
Dept: RADIOLOGY | Facility: MEDICAL CENTER | Age: 84
End: 2019-01-09

## 2019-02-02 DIAGNOSIS — F41.9 ANXIETY: ICD-10-CM

## 2019-02-04 RX ORDER — CITALOPRAM HYDROBROMIDE 10 MG/1
TABLET ORAL
Qty: 30 TAB | Refills: 0 | Status: SHIPPED | OUTPATIENT
Start: 2019-02-04 | End: 2019-03-12 | Stop reason: SDUPTHER

## 2019-02-04 NOTE — TELEPHONE ENCOUNTER
Last seen: 10/19/18 by Dr. Cm  Next appt: 02/28/19 with Dr. Cm    Was the patient seen in the last year in this department? Yes   Does patient have an active prescription for medications requested? No   Received Request Via: Pharmacy

## 2019-02-04 NOTE — TELEPHONE ENCOUNTER
Let family know  Labs are nearly 1 year old  Needs to get labs for refills.   Will ok 30 days if needed

## 2019-02-04 NOTE — TELEPHONE ENCOUNTER
Called #[phone] for Dewayne Perez. From you OV note. But the voicemail say other names than Chris. Did not leave message.  Called #[phone] for Cindy TIWARI/albino. Asking for her to return our call or/and also was trying to get a hold of Chris.

## 2019-03-05 ENCOUNTER — TELEPHONE (OUTPATIENT)
Dept: INTERNAL MEDICINE | Facility: MEDICAL CENTER | Age: 84
End: 2019-03-05

## 2019-03-05 NOTE — TELEPHONE ENCOUNTER
1. Caller Name: Wiley-Dtr                      Call Back Number: [phone]    2. Message: 03/04/19-Wiley called and l/m. Got a month old message from us this weekend.  This is concerning/upsetting that she got the message this old on this number.     3. Patient approves office to leave a detailed voicemail/MyChart message: N\A      Called and spoke with Wiley. Notified her the phone call was about a medication refill request and pt is due for lab work.  She notified me pt has an assistant that helps him.  Her name is Carmen Westluis e and her phone number is #[phone].  They are still working on pt's POA.   >Updated pt's chart with phone numbers above.      Called and spoke with Carmen. Notified pt is due for Labs and also for an appt.(no availability on your schedule). She said she is going to try to get pt's labs done this Thursday. Scheduled pt an appt next week with Dr Martinez at 10am. Also scheduled pt an appt on 07/26/19 on your schedule(Dr Cm)

## 2019-03-06 NOTE — TELEPHONE ENCOUNTER
If there is no resident in [facility], then Urbano needs to open up my clinic from 9-10 and  on this Monday.   Ok to offer him a 40 min appt for one of those slots.

## 2019-03-06 NOTE — TELEPHONE ENCOUNTER
Done. Urbano opened your schedule.    Called and spoke with Carmen. R/s pt for this Monday at 9am on your schedule.(long appt)

## 2019-03-07 ENCOUNTER — HOSPITAL ENCOUNTER (OUTPATIENT)
Dept: LAB | Facility: MEDICAL CENTER | Age: 84
End: 2019-03-07
Attending: INTERNAL MEDICINE
Payer: MEDICARE

## 2019-03-07 DIAGNOSIS — F41.9 ANXIETY AND DEPRESSION: ICD-10-CM

## 2019-03-07 DIAGNOSIS — E78.5 HYPERLIPIDEMIA, UNSPECIFIED HYPERLIPIDEMIA TYPE: ICD-10-CM

## 2019-03-07 DIAGNOSIS — D72.829 LEUKOCYTOSIS, UNSPECIFIED TYPE: ICD-10-CM

## 2019-03-07 DIAGNOSIS — N18.4 STAGE 4 CHRONIC KIDNEY DISEASE (HCC): ICD-10-CM

## 2019-03-07 DIAGNOSIS — I10 BENIGN ESSENTIAL HTN: ICD-10-CM

## 2019-03-07 DIAGNOSIS — Z12.5 SCREENING FOR PROSTATE CANCER: ICD-10-CM

## 2019-03-07 DIAGNOSIS — F32.A ANXIETY AND DEPRESSION: ICD-10-CM

## 2019-03-07 LAB
25(OH)D3 SERPL-MCNC: 29 NG/ML (ref 30–100)
ALBUMIN SERPL BCP-MCNC: 3.9 G/DL (ref 3.2–4.9)
ALBUMIN/GLOB SERPL: 1.7 G/DL
ALP SERPL-CCNC: 98 U/L (ref 30–99)
ALT SERPL-CCNC: 21 U/L (ref 2–50)
ANION GAP SERPL CALC-SCNC: 3 MMOL/L (ref 0–11.9)
ANISOCYTOSIS BLD QL SMEAR: ABNORMAL
AST SERPL-CCNC: 13 U/L (ref 12–45)
BASOPHILS # BLD AUTO: 0 % (ref 0–1.8)
BASOPHILS # BLD: 0 K/UL (ref 0–0.12)
BILIRUB SERPL-MCNC: 0.9 MG/DL (ref 0.1–1.5)
BUN SERPL-MCNC: 34 MG/DL (ref 8–22)
CALCIUM SERPL-MCNC: 9 MG/DL (ref 8.5–10.5)
CHLORIDE SERPL-SCNC: 105 MMOL/L (ref 96–112)
CHOLEST SERPL-MCNC: 216 MG/DL (ref 100–199)
CO2 SERPL-SCNC: 30 MMOL/L (ref 20–33)
CREAT SERPL-MCNC: 1.76 MG/DL (ref 0.5–1.4)
EOSINOPHIL # BLD AUTO: 0 K/UL (ref 0–0.51)
EOSINOPHIL NFR BLD: 0 % (ref 0–6.9)
ERYTHROCYTE [DISTWIDTH] IN BLOOD BY AUTOMATED COUNT: 55.1 FL (ref 35.9–50)
FASTING STATUS PATIENT QL REPORTED: NORMAL
FOLATE SERPL-MCNC: 16.1 NG/ML
GLOBULIN SER CALC-MCNC: 2.3 G/DL (ref 1.9–3.5)
GLUCOSE SERPL-MCNC: 91 MG/DL (ref 65–99)
HCT VFR BLD AUTO: 40 % (ref 42–52)
HDLC SERPL-MCNC: 59 MG/DL
HGB BLD-MCNC: 12.9 G/DL (ref 14–18)
LDLC SERPL CALC-MCNC: 141 MG/DL
LYMPHOCYTES # BLD AUTO: 2.84 K/UL (ref 1–4.8)
LYMPHOCYTES NFR BLD: 29 % (ref 22–41)
MACROCYTES BLD QL SMEAR: ABNORMAL
MANUAL DIFF BLD: NORMAL
MCH RBC QN AUTO: 34.7 PG (ref 27–33)
MCHC RBC AUTO-ENTMCNC: 32.3 G/DL (ref 33.7–35.3)
MCV RBC AUTO: 107.5 FL (ref 81.4–97.8)
MONOCYTES # BLD AUTO: 0.59 K/UL (ref 0–0.85)
MONOCYTES NFR BLD AUTO: 6 % (ref 0–13.4)
MORPHOLOGY BLD-IMP: NORMAL
NEUTROPHILS # BLD AUTO: 6.37 K/UL (ref 1.82–7.42)
NEUTROPHILS NFR BLD: 65 % (ref 44–72)
NRBC # BLD AUTO: 0 K/UL
NRBC BLD-RTO: 0 /100 WBC
PLATELET # BLD AUTO: 140 K/UL (ref 164–446)
PLATELET BLD QL SMEAR: NORMAL
PMV BLD AUTO: 9.1 FL (ref 9–12.9)
POTASSIUM SERPL-SCNC: 4.7 MMOL/L (ref 3.6–5.5)
PROT SERPL-MCNC: 6.2 G/DL (ref 6–8.2)
PSA SERPL-MCNC: 143 NG/ML (ref 0–4)
RBC # BLD AUTO: 3.72 M/UL (ref 4.7–6.1)
RBC BLD AUTO: PRESENT
SODIUM SERPL-SCNC: 138 MMOL/L (ref 135–145)
TRIGL SERPL-MCNC: 81 MG/DL (ref 0–149)
TSH SERPL DL<=0.005 MIU/L-ACNC: 2.67 UIU/ML (ref 0.38–5.33)
VIT B12 SERPL-MCNC: 1149 PG/ML (ref 211–911)
WBC # BLD AUTO: 9.8 K/UL (ref 4.8–10.8)

## 2019-03-07 PROCEDURE — 80053 COMPREHEN METABOLIC PANEL: CPT

## 2019-03-07 PROCEDURE — 80061 LIPID PANEL: CPT

## 2019-03-07 PROCEDURE — 82607 VITAMIN B-12: CPT

## 2019-03-07 PROCEDURE — 84443 ASSAY THYROID STIM HORMONE: CPT

## 2019-03-07 PROCEDURE — 82746 ASSAY OF FOLIC ACID SERUM: CPT

## 2019-03-07 PROCEDURE — 85007 BL SMEAR W/DIFF WBC COUNT: CPT

## 2019-03-07 PROCEDURE — 82306 VITAMIN D 25 HYDROXY: CPT

## 2019-03-07 PROCEDURE — 36415 COLL VENOUS BLD VENIPUNCTURE: CPT

## 2019-03-07 PROCEDURE — 85027 COMPLETE CBC AUTOMATED: CPT

## 2019-03-07 PROCEDURE — 84153 ASSAY OF PSA TOTAL: CPT

## 2019-03-11 ENCOUNTER — TELEPHONE (OUTPATIENT)
Dept: INTERNAL MEDICINE | Facility: MEDICAL CENTER | Age: 84
End: 2019-03-11

## 2019-03-11 ENCOUNTER — OFFICE VISIT (OUTPATIENT)
Dept: INTERNAL MEDICINE | Facility: MEDICAL CENTER | Age: 84
End: 2019-03-11
Payer: MEDICARE

## 2019-03-11 VITALS
HEIGHT: 62 IN | OXYGEN SATURATION: 96 % | WEIGHT: 110.38 LBS | SYSTOLIC BLOOD PRESSURE: 150 MMHG | DIASTOLIC BLOOD PRESSURE: 70 MMHG | HEART RATE: 65 BPM | TEMPERATURE: 97.4 F | BODY MASS INDEX: 20.31 KG/M2

## 2019-03-11 DIAGNOSIS — I10 BENIGN ESSENTIAL HTN: ICD-10-CM

## 2019-03-11 DIAGNOSIS — C91.10 CLL (CHRONIC LYMPHOCYTIC LEUKEMIA) (HCC): ICD-10-CM

## 2019-03-11 DIAGNOSIS — R97.20 ELEVATED PSA: ICD-10-CM

## 2019-03-11 DIAGNOSIS — R41.89 IMPAIRED COGNITION: ICD-10-CM

## 2019-03-11 DIAGNOSIS — N18.30 CKD (CHRONIC KIDNEY DISEASE) STAGE 3, GFR 30-59 ML/MIN (HCC): ICD-10-CM

## 2019-03-11 DIAGNOSIS — R26.9 GAIT DISORDER: ICD-10-CM

## 2019-03-11 DIAGNOSIS — K59.00 CONSTIPATION, UNSPECIFIED CONSTIPATION TYPE: ICD-10-CM

## 2019-03-11 DIAGNOSIS — R42 DIZZINESS: ICD-10-CM

## 2019-03-11 DIAGNOSIS — K21.9 GASTROESOPHAGEAL REFLUX DISEASE, ESOPHAGITIS PRESENCE NOT SPECIFIED: ICD-10-CM

## 2019-03-11 DIAGNOSIS — G47.00 INSOMNIA, UNSPECIFIED TYPE: ICD-10-CM

## 2019-03-11 DIAGNOSIS — F41.9 ANXIETY: ICD-10-CM

## 2019-03-11 PROCEDURE — 99214 OFFICE O/P EST MOD 30 MIN: CPT | Performed by: INTERNAL MEDICINE

## 2019-03-11 ASSESSMENT — PATIENT HEALTH QUESTIONNAIRE - PHQ9: CLINICAL INTERPRETATION OF PHQ2 SCORE: 0

## 2019-03-11 NOTE — PATIENT INSTRUCTIONS
I will call daughter.     I recommend the Shingrix vaccine series to prevent shingles.  Please get the vaccines at a local pharmacy.        Use the walker.   I wrote you for a Rollator.     See dermatology.    See podiatry.    [address]  [place] [id number]  [phone]  ELIDIA LAWRENCE

## 2019-03-11 NOTE — PROGRESS NOTES
Spoke to NYLA Jama today.   She is working on conservatorship.  Currently she is med DPOA.   Da in no longer involved.   Reviewe labs.   Discussed concern for CLL, prostate cancer in the past and how we have decided to hold off on eval as quality of life was most important to Don -- she is ok with that.   Discussed CKD, walking issues, need for walker, onychomycosis, need for podiatry foot care.    Discussed how POLST was signed off in 2016 -- she thinks DNR/DNI is reasonable too.   All questions were answered to the patient's and/or family's satisfaction and patient and/or family was/were appreciative of the phone call.     MAs -- could you reach out to NYLA Jama and give her info to set up [facility].  She is listed as one of DPOAs on last AD we have on file.

## 2019-03-12 DIAGNOSIS — F41.9 ANXIETY: ICD-10-CM

## 2019-03-12 RX ORDER — CITALOPRAM HYDROBROMIDE 10 MG/1
TABLET ORAL
Qty: 90 TAB | Refills: 1 | Status: SHIPPED | OUTPATIENT
Start: 2019-03-12

## 2019-03-12 NOTE — PROGRESS NOTES
Follow-up     Chief Complaint   Patient presents with   • Results     Labs   • Dizziness     When just stadning up   • Hyperlipidemia   • Hypertension   • Orders Needed     4-wheel walker with seat   • Tired     When walking long distance       HPI  History was obtained from the patient, CG, and DTR (after visit) and a medical record review.    Here with CG, Carmen Juany.  X 3 years.  Had been helping with dog.  Now, helping with shopping, med management, doctor's appt.  Pt able to do hygiene and personal care.    Carmen thinks pt is doing well.   Still with some gait imbalance.  Uses walker.  Wants one with seat and wheels.   No falls.   Mood ok on SSRI.    Better since Da out of picture.   Fam working on getting conservatorship for pt with .     Memory an issue.   Able to ADLs.  Fam helps finances.   CG helps with meds, doc appts.     THREE CHRONIC CONDITIONS  CKD, stble  H/o asthma, stable  LUTS, stable    Past Medical History:   Diagnosis Date   • Cognitive impairment    • Fracture of left femur (HCC)    • GERD (gastroesophageal reflux disease)    • History of asthma    • Kidney disease     with 2/2 hyperPTH   • Lower urinary tract symptoms 7/7/2016   • Prostate CA (HCC)    • Rib fracture     personal history of fall on ice 2015   • Skin cancer        Past Surgical History:   Procedure Laterality Date   • APPENDECTOMY     • CATARACT EXTRACTION WITH IOL     • OTHER      nasal growth removal x 2   • OTHER ORTHOPEDIC SURGERY      L femur repair   • TONSILLECTOMY         Current Outpatient Prescriptions   Medication Sig Dispense Refill   • citalopram (CELEXA) 10 MG tablet TAKE ONE TABLET BY MOUTH EVERY NIGHT AT BEDTIME.  30 Tab 0   • acetaminophen (TYLENOL) 325 MG Tab Take 2 Tabs by mouth every 6 hours as needed. 30 Tab    • melatonin 3 MG Tab Take 3 mg by mouth every bedtime.     • omeprazole (PRILOSEC) 20 MG delayed-release capsule TAKE ONE CAPSULE BY MOUTH ONE TIME DAILY  90 Cap 3   • Docusate  "Calcium (STOOL SOFTENER PO) Take 1 Tab by mouth 2 Times a Day.     • Cyanocobalamin (VITAMIN B-12) 1000 MCG Tab Take 1,000 mcg by mouth every day.       No current facility-administered medications for this visit.        Allergies as of 2019   • (No Known Allergies)       Social History     Social History   • Marital status:      Spouse name: N/A   • Number of children: N/A   • Years of education: N/A     Occupational History   • Not on file.     Social History Main Topics   • Smoking status: Former Smoker     Types: Cigarettes     Quit date: 1970   • Smokeless tobacco: Never Used      Comment:  ppd 40 years; stopped ; still chews   • Alcohol use No   • Drug use: No   • Sexual activity: No     Other Topics Concern   • Not on file     Social History Narrative    Lives in [facility] alone.      [facility].     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    ROS:   Diff to obtain 2/2 memory impairment  No CP, heart palp  No dizziness or LH.   No SOB  Anxiety ok.   /70 (BP Location: Right arm, Patient Position: Standing, BP Cuff Size: Adult) Comment (Patient Position): 1MIN  Pulse 65   Temp 36.3 °C (97.4 °F) (Temporal)   Ht 1.575 m (5' 2\")   Wt 50.1 kg (110 lb 6 oz)   SpO2 96%   BMI 20.19 kg/m²     Physical Exam  General:  Alert and oriented.  No apparent distress.    Eyes: No scleral icterus. No conjunctival injection.  PERRL    Ears:  Circle    ENMT: L lateral  nose mishapen with subcm scab    Moist mucous membranes. Oropharynx clear. No erythema or exudates noted.    Neck: Supple. Trachea midline.    Resp: Clear to auscultation and percussion bilaterally. No rales, rhonchi, or wheezes.    Cardiovascular: Regular rate and normal rhythm. No murmurs, rubs or gallops. 2+ radial  pulses.    Feet warm  1 + DP "     Abdomen:     Musculoskeletal: No clubbing, cyanosis, edema.  Uses walker  Stance is wide  Steps symmetric    Skin: Feet warm.  Toe nails long  No skin breakdown on feet  SKs    Lymph:     Neuro:     Psych: Mood euthymic. Affect congruent.      Other:     Labs/Studies  Hospital Outpatient Visit on 03/07/2019   Component Date Value Ref Range Status   • Sodium 03/07/2019 138  135 - 145 mmol/L Final   • Potassium 03/07/2019 4.7  3.6 - 5.5 mmol/L Final   • Chloride 03/07/2019 105  96 - 112 mmol/L Final   • Co2 03/07/2019 30  20 - 33 mmol/L Final   • Anion Gap 03/07/2019 3.0  0.0 - 11.9 Final   • Glucose 03/07/2019 91  65 - 99 mg/dL Final   • Bun 03/07/2019 34* 8 - 22 mg/dL Final   • Creatinine 03/07/2019 1.76* 0.50 - 1.40 mg/dL Final   • Calcium 03/07/2019 9.0  8.5 - 10.5 mg/dL Final   • AST(SGOT) 03/07/2019 13  12 - 45 U/L Final   • ALT(SGPT) 03/07/2019 21  2 - 50 U/L Final   • Alkaline Phosphatase 03/07/2019 98  30 - 99 U/L Final   • Total Bilirubin 03/07/2019 0.9  0.1 - 1.5 mg/dL Final   • Albumin 03/07/2019 3.9  3.2 - 4.9 g/dL Final   • Total Protein 03/07/2019 6.2  6.0 - 8.2 g/dL Final   • Globulin 03/07/2019 2.3  1.9 - 3.5 g/dL Final   • A-G Ratio 03/07/2019 1.7  g/dL Final   • WBC 03/07/2019 9.8  4.8 - 10.8 K/uL Final   • RBC 03/07/2019 3.72* 4.70 - 6.10 M/uL Final   • Hemoglobin 03/07/2019 12.9* 14.0 - 18.0 g/dL Final   • Hematocrit 03/07/2019 40.0* 42.0 - 52.0 % Final   • MCV 03/07/2019 107.5* 81.4 - 97.8 fL Final   • MCH 03/07/2019 34.7* 27.0 - 33.0 pg Final   • MCHC 03/07/2019 32.3* 33.7 - 35.3 g/dL Final   • RDW 03/07/2019 55.1* 35.9 - 50.0 fL Final   • Platelet Count 03/07/2019 140* 164 - 446 K/uL Final   • MPV 03/07/2019 9.1  9.0 - 12.9 fL Final   • Neutrophils-Polys 03/07/2019 65.00  44.00 - 72.00 % Final   • Lymphocytes 03/07/2019 29.00  22.00 - 41.00 % Final   • Monocytes 03/07/2019 6.00  0.00 - 13.40 % Final   • Eosinophils 03/07/2019 0.00  0.00 - 6.90 % Final   • Basophils 03/07/2019 0.00  0.00 -  1.80 % Final   • Nucleated RBC 03/07/2019 0.00  /100 WBC Final   • Neutrophils (Absolute) 03/07/2019 6.37  1.82 - 7.42 K/uL Final    Includes immature neutrophils, if present.   • Lymphs (Absolute) 03/07/2019 2.84  1.00 - 4.80 K/uL Final   • Monos (Absolute) 03/07/2019 0.59  0.00 - 0.85 K/uL Final   • Eos (Absolute) 03/07/2019 0.00  0.00 - 0.51 K/uL Final   • Baso (Absolute) 03/07/2019 0.00  0.00 - 0.12 K/uL Final   • NRBC (Absolute) 03/07/2019 0.00  K/uL Final   • Anisocytosis 03/07/2019 1+   Final   • Macrocytosis 03/07/2019 1+   Final   • TSH 03/07/2019 2.670  0.380 - 5.330 uIU/mL Final    Comment: Please note new reference ranges effective 12/14/2017 10:00 AM  Pregnant Females, 1st Trimester  0.050-3.700  Pregnant Females, 2nd Trimester  0.310-4.350  Pregnant Females, 3rd Trimester  0.410-5.180     • Vitamin B12 -True Cobalamin 03/07/2019 1149* 211 - 911 pg/mL Final   • Folate -Folic Acid 03/07/2019 16.1  >4.0 ng/mL Final   • 25-Hydroxy   Vitamin D 25 03/07/2019 29* 30 - 100 ng/mL Final    Comment: Adult Ranges:   <20 ng/mL - Deficiency  20-29 ng/mL - Insufficiency   ng/mL - Sufficiency  The [employer] Centaur Vitamin D Assay is standardized to the  [employer] reference measurement procedures, a  reference method for the Vitamin D Standardization Program  (VDSP).  The VDSP aligns patient results among 25 (OH)  Vitamin D methods.     • Cholesterol,Tot 03/07/2019 216* 100 - 199 mg/dL Final   • Triglycerides 03/07/2019 81  0 - 149 mg/dL Final   • HDL 03/07/2019 59  >=40 mg/dL Final   • LDL 03/07/2019 141* <100 mg/dL Final   • Prostatic Specific Antigen Tot 03/07/2019 143.00* 0.00 - 4.00 ng/mL Final    Comment: The Access Hybritech PSA assay is a paramagnetic particle,  chemiluminescent immunoassay for the quantitative determination  of total prostate specific antigen (PSA) levels using the  Access Immunoassay System. Values obtained with different  methods cannot be used interchangeably for patient  monitoring.     • Fasting Status 03/07/2019 Fasting   Final   • GFR If  03/07/2019 44* >60 mL/min/1.73 m 2 Final   • GFR If Non  03/07/2019 36* >60 mL/min/1.73 m 2 Final   • Manual Diff Status 03/07/2019 PERFORMED   Final   • Peripheral Smear Review 03/07/2019 see below   Final    Comment: Due to instrument suspect flags, further review of peripheral smear is  indicated on this patient sample. This review may or may not result in  abnormal findings.     • Plt Estimation 03/07/2019 Decreased   Final   • RBC Morphology 03/07/2019 Present   Final     Oct 2017  MRI brain without contrast  No mass    Assessment and Plan  1. Benign essential HTN  Borderline high but will allow in case orthostasis is contributing to gait imbalance    2. Gait disorder  3. Dizziness  ok'd Rollator  - DME Walker    in past, when able to get more of history, seemed to be having 2 processes at least going on  1. The imbalance/instability is related to decreased muscle strength/frailty and possibly meds  2. Occ he will get LH which will make imbalance worse  EKG done in past was NSR with no arrhythmia  Orthostatics were negative in past and but there could still be a component as he gets LH with standing  Deferred further work up as burden of care too great given GOC  No recent falls  Previous visit - Explained to son how I was hopeful it was debility; however, in theory worst case scenario, it could be prostate cancer met to bone/cord; think that is unlikely as pt without other sxs/signs (good strength, no b/b incont)  Will take a more conservative route given GOC  Today, at times pt reports dizziness but diff to get a history   Now using walker   Watch for now  Has done PT in past    4. CKD (chronic kidney disease) stage 3, GFR 30-59 ml/min (HCC)  Stable   Monitor   Avoid nephrotoxic agents  Renally dose medications     5. CLL (chronic lymphocytic leukemia) (HCC)  presumed  With low grade anemia and TCP  Watch  for now  Holding off on eval and treatment given GOC    Presumed prostate Ca  Wants to trend PSA but not interested in treatment   Denies LUTS at this time - in past failed tamsulosin 2/2 GI upset  Watch   - PROSTATE SPECIFIC AG SCREENING; Future    6. Elevated PSA  Presumed prostate Ca  In past wanted to trend PSA but not interested in treatment   Denies LUTS at this time - in past failed tamsulosin 2/2 GI upset    7. Impaired cognition  MCI v early dementia as needing help with iadls  In past, did not eval given GOC  Working on guardianship     8. Constipation, unspecified constipation type  Ok on stool softener    9. Anxiety  Ok on   - citalopram (CELEXA) 10 MG tablet; Take 1 Tab by mouth every day.  Dispense: 30 Tab  No longer on hydroxyzine or bzd    10. Insomnia, unspecified type  Ok on melatonin     11. Gastroesophageal reflux disease, esophagitis presence not specified  Ok on ppi     Arthralgia, unspecified joint  rec Tylenol over nsaids  - acetaminophen (TYLENOL) 325 MG Tab; Take 2 Tabs by mouth every 6 hours as needed.  Dispense: 30 Tab    Leg cramps  Diff to get history   Not an issue today    Polypharmacy  Reviewed all meds    Onychomycosis  Needs to go to [facility]    Hyperlipidemia, unspecified hyperlipidemia type  Watch     Vitamin B deficiency  On B12    History of skin cancer  Squamous cell carcinoma of nose  S/p rads with marked improvement clinically   Sees derm    Chronic nonintractable headache, unspecified headache type  Long standing but maybe worse -- not an issue recently   Had brain MRI without contrast that was negative for mass  Will hold off on brain MRI with contrast given GOC  Step son thinks it's related to stress   Controlled with Tylenol   Not an issue today     Health maintenance   See immunizations   Got Prevnar and Pneumovax  TDAP UTD  Could get shingles shot  Flu shot 2019  Consider DEXA given fall and fracture - declines  Defer Ca screenings given age and  comorbidities    ACP  Pt is DNR/DNI - limited med intervention ok.  Completed POLST Spring/Summer 2017.    Open to treating issues that are easily treatable with minimal burden of care.   Wants to avoid burdensome care.  Doesn't want to eval or treat for possible cancer (prostate and CLL) -- wants to focus on QOL.     Patient Instructions   I will call daughter.     I recommend the Shingrix vaccine series to prevent shingles.  Please get the vaccines at a local pharmacy.        Use the walker.   I wrote you for a Rollator.     See dermatology.    See podiatry.    [id number]  [facility] [address]  [phone]  ELIDIA LAWRENCE     Follow-up  Return in about 3 months (around 6/11/2019).    Signed by: Stacy Cm M.D.

## 2019-03-12 NOTE — TELEPHONE ENCOUNTER
Last seen: 03/11/19 by Dr. Cm  Next appt: 07/26/19 with Dr. Cm    Was the patient seen in the last year in this department? Yes   Does patient have an active prescription for medications requested? No   Received Request Via: Pharmacy

## 2019-03-12 NOTE — TELEPHONE ENCOUNTER
[facility] is not contracted with [facility]    Called and spoke with [facility]. They do accept [facility].\    FAXED Dme order to [facility]  Fax#[phone]    Called and spoke with Carmen. Notified her of this.

## 2019-04-10 DIAGNOSIS — K21.9 GASTROESOPHAGEAL REFLUX DISEASE WITHOUT ESOPHAGITIS: Chronic | ICD-10-CM

## 2019-04-10 RX ORDER — OMEPRAZOLE 20 MG/1
CAPSULE, DELAYED RELEASE ORAL
Qty: 90 CAP | Refills: 1 | Status: SHIPPED | OUTPATIENT
Start: 2019-04-10

## 2020-01-07 ENCOUNTER — HOSPITAL ENCOUNTER (OUTPATIENT)
Dept: RADIATION ONCOLOGY | Facility: MEDICAL CENTER | Age: 85
End: 2020-01-31
Attending: RADIOLOGY
Payer: MEDICARE

## 2020-01-07 VITALS
DIASTOLIC BLOOD PRESSURE: 56 MMHG | TEMPERATURE: 97.8 F | SYSTOLIC BLOOD PRESSURE: 147 MMHG | HEART RATE: 87 BPM | OXYGEN SATURATION: 92 %

## 2020-01-07 DIAGNOSIS — C44.321 SQUAMOUS CELL CARCINOMA OF NOSE: ICD-10-CM

## 2020-01-07 PROCEDURE — 99212 OFFICE O/P EST SF 10 MIN: CPT | Performed by: RADIOLOGY

## 2020-01-07 PROCEDURE — 99214 OFFICE O/P EST MOD 30 MIN: CPT | Performed by: RADIOLOGY

## 2020-01-07 ASSESSMENT — PAIN SCALES - GENERAL: PAINLEVEL: NO PAIN

## 2020-01-07 NOTE — NON-PROVIDER
Patient was seen today in clinic with Dr. Gleason for Follow up.  Vitals signs and weight were obtained and pain assessment was completed.  Allergies and medications were reviewed with the patient.  Review of systems completed.     Vitals/Pain:  Vitals:    01/07/20 1046   BP: 147/56   Pulse: 87   Temp: 36.6 °C (97.8 °F)   SpO2: 92%   Pain Score: No pain        Allergies:   Patient has no known allergies.    Current Medications:  Current Outpatient Medications   Medication Sig Dispense Refill   • omeprazole (PRILOSEC) 20 MG delayed-release capsule TAKE ONE CAPSULE BY MOUTH ONE TIME DAILY  90 Cap 1   • citalopram (CELEXA) 10 MG tablet TAKE ONE TABLET BY MOUTH EVERY NIGHT AT BEDTIME. 90 Tab 1   • acetaminophen (TYLENOL) 325 MG Tab Take 2 Tabs by mouth every 6 hours as needed. 30 Tab    • melatonin 3 MG Tab Take 3 mg by mouth every bedtime.     • Docusate Calcium (STOOL SOFTENER PO) Take 1 Tab by mouth 2 Times a Day.     • Cyanocobalamin (VITAMIN B-12) 1000 MCG Tab Take 1,000 mcg by mouth every day.       No current facility-administered medications for this encounter.          PCP:  Toi Estrada, Med Ass't

## 2020-01-07 NOTE — PROGRESS NOTES
RADIATION ONCOLOGY FOLLOW UP    DATE OF SERVICE: 1/7/2020    IDENTIFICATION:   Stage II (T2 N0 M0), recurrent basal cell carcinoma of the left   nasal ala, status post Erivedge followed by external beam radiotherapy to 5750   cGy in 23 fractions, completing in February 2017.  Now with recurrence in the left nasal giovanna    INTERVAL HISTORY: I had the pleasure of seeing Mr. Huerta today in follow up for his basal cell carcinoma.   Patient is now a 89-year-old gentleman for whom I have not seen in several years.  He states after completing the radiotherapy he had good response to what was a bulky lesion at that time.  Unfortunately more recently this is started to recur.  I asked to see the patient to evaluate whether this is within the field of radiotherapy or just outside.  He states it is becoming more uncomfortable.    PHYSICAL EXAM:    Vitals:    01/07/20 1046   BP: 147/56   Pulse: 87   Temp: 36.6 °C (97.8 °F)   SpO2: 92%   Pain Score: No pain      2= Ambulatory and capable of all self care, but unable to carry out any work activities.  Up and about more than 50% of waking hours.    GENERAL: No apparent distress.  HEENT:  Pupils are equal, round, and reactive to light.  Extraocular muscles   are intact. Sclerae nonicteric.  Conjunctivae pink.  Oral cavity, tongue   protrudes midline.  Patient has a roughly 1 cm scaly raised lesion on the left nasal giovanna  NECK:  Supple without evidence of thyromegaly.  NODES:  No peripheral adenopathy of the neck, supraclavicular fossa or axillae   bilaterally.  LUNGS:  Clear to ascultation bilaterally   HEART:  Regular rate and rhythm.  No murmur appreciated  ABDOMEN:  Soft. No evidence of hepatosplenomegaly.  Positive bowel sounds.  EXTREMITIES:  Without Edema.  NEUROLOGIC:  Cranial nerves II through XII were intact. Normal stance and gait motor and sensory grossly within normal limits      IMPRESSION:   Stage II (T2 N0 M0), recurrent basal cell carcinoma of the left   nasal ala,  status post Erivedge followed by external beam radiotherapy to 5750   cGy in 23 fractions, completing in February 2017. Now with recurrence in the left nasal giovanna        RECOMMENDATIONS:   I discussed with the patient his findings over a 35 minute time period, 95% of that time dedicated to an ongoing treatment plan.  Unfortunately patient's lesion is directly within the previous radiation field.  Given the size of disease and location he was at risk of recurrence.  He now appears to be exhibiting that recurrence.  He is also not an ideal surgical candidate.  He would also like to avoid surgery if at all possible.  I will send a referral back to medical oncology to see if further targeted therapy is a possibility for the patient.        If patient has any questions or concerns, he should feel free to contact me.

## 2020-03-11 ENCOUNTER — HOSPITAL ENCOUNTER (OUTPATIENT)
Dept: LAB | Facility: MEDICAL CENTER | Age: 85
End: 2020-03-11
Attending: INTERNAL MEDICINE
Payer: MEDICARE

## 2020-03-11 PROCEDURE — 36415 COLL VENOUS BLD VENIPUNCTURE: CPT

## 2020-03-11 PROCEDURE — 82550 ASSAY OF CK (CPK): CPT

## 2020-03-11 PROCEDURE — 80053 COMPREHEN METABOLIC PANEL: CPT

## 2020-03-12 LAB
ALBUMIN SERPL BCP-MCNC: 4 G/DL (ref 3.2–4.9)
ALBUMIN/GLOB SERPL: 2 G/DL
ALP SERPL-CCNC: 84 U/L (ref 30–99)
ALT SERPL-CCNC: 19 U/L (ref 2–50)
ANION GAP SERPL CALC-SCNC: 8 MMOL/L (ref 7–16)
AST SERPL-CCNC: 14 U/L (ref 12–45)
BILIRUB SERPL-MCNC: 0.8 MG/DL (ref 0.1–1.5)
BUN SERPL-MCNC: 45 MG/DL (ref 8–22)
CALCIUM SERPL-MCNC: 9 MG/DL (ref 8.5–10.5)
CHLORIDE SERPL-SCNC: 104 MMOL/L (ref 96–112)
CK SERPL-CCNC: 65 U/L (ref 0–154)
CO2 SERPL-SCNC: 27 MMOL/L (ref 20–33)
CREAT SERPL-MCNC: 2.08 MG/DL (ref 0.5–1.4)
GLOBULIN SER CALC-MCNC: 2 G/DL (ref 1.9–3.5)
GLUCOSE SERPL-MCNC: 174 MG/DL (ref 65–99)
POTASSIUM SERPL-SCNC: 4.1 MMOL/L (ref 3.6–5.5)
PROT SERPL-MCNC: 6 G/DL (ref 6–8.2)
SODIUM SERPL-SCNC: 139 MMOL/L (ref 135–145)

## 2020-03-13 ENCOUNTER — HOSPITAL ENCOUNTER (OUTPATIENT)
Dept: RADIOLOGY | Facility: MEDICAL CENTER | Age: 85
End: 2020-03-13
Attending: INTERNAL MEDICINE
Payer: MEDICARE

## 2020-03-13 DIAGNOSIS — C44.311 BASAL CELL CARCINOMA OF NASOLABIAL GROOVE: ICD-10-CM

## 2020-03-13 PROCEDURE — 71260 CT THORAX DX C+: CPT

## 2020-03-13 PROCEDURE — 700117 HCHG RX CONTRAST REV CODE 255: Performed by: INTERNAL MEDICINE

## 2020-03-13 PROCEDURE — 70491 CT SOFT TISSUE NECK W/DYE: CPT

## 2020-03-13 RX ADMIN — IOHEXOL 75 ML: 350 INJECTION, SOLUTION INTRAVENOUS at 15:45

## 2020-03-30 ENCOUNTER — APPOINTMENT (RX ONLY)
Dept: URBAN - METROPOLITAN AREA CLINIC 36 | Facility: CLINIC | Age: 85
Setting detail: DERMATOLOGY
End: 2020-03-30

## 2020-03-30 PROBLEM — C44.311 BASAL CELL CARCINOMA OF SKIN OF NOSE: Status: ACTIVE | Noted: 2020-03-30

## 2020-03-30 PROCEDURE — 11900 INJECT SKIN LESIONS </W 7: CPT

## 2020-03-30 PROCEDURE — ? BLEOMYCIN

## 2020-03-30 PROCEDURE — ? DIAGNOSIS COMMENT

## 2020-03-30 PROCEDURE — ? ADDITIONAL NOTES

## 2020-03-30 NOTE — PROCEDURE: BLEOMYCIN
Anesthesia Type: normal saline
Post-Care Instructions: I reviewed with the patient in detail post-care instructions. The patient understands that the treated areas should be washed off 6 to 8 hours after application.
Lot # (Optional): [id number]
Method Of Treatment: injection
Bill J-Code: yes
Add 52 Modifier (Optional): no
Concentration (Units/Cc): 1
Total Volume (Ccs): 0.2
Expiration Date (Optional): 5/1/2020
Consent: The patient's consent was obtained including but not limited to risks of crusting, scabbing, scarring, blistering, flagellate hyperpigmentation, local vasospasm, darker or lighter pigmentary change, recurrence, incomplete removal and infection.
Detail Level: Detailed

## 2020-03-30 NOTE — PROCEDURE: ADDITIONAL NOTES
Detail Level: Zone
Additional Notes: I discussed options with Shemar and his caregiver including monthly bleomycin injections with the goal of hopefully keeping this lesion from progressing and keeping it stable to the point of not causing breathing obstruction and from opening up on the ala anymore than it has vs debulking which may be necessary if the tumor grows. I explained that complete removal would be a major surgery with a lot of morbidity and potential for complications. Shemar does not want to do surgery. I also don’t think at 89 that he is a great candidate for erivedge however it can be used in elderly patients and might need to be considered if it is growing. I will discuss with Dr. Iraheta the pet scan. Shemar is agreeable to doing bleomycin injections at this point

## 2020-04-03 ENCOUNTER — HOSPITAL ENCOUNTER (INPATIENT)
Facility: MEDICAL CENTER | Age: 85
LOS: 3 days | DRG: 699 | End: 2020-04-06
Attending: EMERGENCY MEDICINE | Admitting: INTERNAL MEDICINE
Payer: MEDICARE

## 2020-04-03 ENCOUNTER — APPOINTMENT (OUTPATIENT)
Dept: RADIOLOGY | Facility: MEDICAL CENTER | Age: 85
DRG: 699 | End: 2020-04-03
Attending: EMERGENCY MEDICINE
Payer: MEDICARE

## 2020-04-03 DIAGNOSIS — N17.9 AKI (ACUTE KIDNEY INJURY) (HCC): ICD-10-CM

## 2020-04-03 DIAGNOSIS — E86.0 DEHYDRATION: ICD-10-CM

## 2020-04-03 DIAGNOSIS — C79.9 MULTIPLE LESIONS OF METASTATIC MALIGNANCY (HCC): ICD-10-CM

## 2020-04-03 DIAGNOSIS — M25.559 HIP PAIN: ICD-10-CM

## 2020-04-03 PROBLEM — F03.90 DEMENTIA (HCC): Status: ACTIVE | Noted: 2020-04-03

## 2020-04-03 PROBLEM — N13.9 URINARY OBSTRUCTION: Status: ACTIVE | Noted: 2020-04-03

## 2020-04-03 LAB
ALBUMIN SERPL BCP-MCNC: 4 G/DL (ref 3.2–4.9)
ALBUMIN/GLOB SERPL: 1.5 G/DL
ALP SERPL-CCNC: 184 U/L (ref 30–99)
ALT SERPL-CCNC: 21 U/L (ref 2–50)
ANION GAP SERPL CALC-SCNC: 10 MMOL/L (ref 7–16)
APPEARANCE UR: CLEAR
AST SERPL-CCNC: 18 U/L (ref 12–45)
BASOPHILS # BLD AUTO: 0.3 % (ref 0–1.8)
BASOPHILS # BLD: 0.03 K/UL (ref 0–0.12)
BILIRUB SERPL-MCNC: 0.6 MG/DL (ref 0.1–1.5)
BILIRUB UR QL STRIP.AUTO: NEGATIVE
BUN SERPL-MCNC: 59 MG/DL (ref 8–22)
CALCIUM SERPL-MCNC: 8.7 MG/DL (ref 8.4–10.2)
CHLORIDE SERPL-SCNC: 101 MMOL/L (ref 96–112)
CO2 SERPL-SCNC: 27 MMOL/L (ref 20–33)
COLOR UR: YELLOW
CREAT SERPL-MCNC: 1.9 MG/DL (ref 0.5–1.4)
EKG IMPRESSION: NORMAL
EOSINOPHIL # BLD AUTO: 0.12 K/UL (ref 0–0.51)
EOSINOPHIL NFR BLD: 1.2 % (ref 0–6.9)
ERYTHROCYTE [DISTWIDTH] IN BLOOD BY AUTOMATED COUNT: 52.8 FL (ref 35.9–50)
GLOBULIN SER CALC-MCNC: 2.6 G/DL (ref 1.9–3.5)
GLUCOSE SERPL-MCNC: 125 MG/DL (ref 65–99)
GLUCOSE UR STRIP.AUTO-MCNC: NEGATIVE MG/DL
HCT VFR BLD AUTO: 34.8 % (ref 42–52)
HGB BLD-MCNC: 11.3 G/DL (ref 14–18)
HYALINE CASTS #/AREA URNS LPF: ABNORMAL /LPF
IMM GRANULOCYTES # BLD AUTO: 0.07 K/UL (ref 0–0.11)
IMM GRANULOCYTES NFR BLD AUTO: 0.7 % (ref 0–0.9)
KETONES UR STRIP.AUTO-MCNC: NEGATIVE MG/DL
LEUKOCYTE ESTERASE UR QL STRIP.AUTO: NEGATIVE
LYMPHOCYTES # BLD AUTO: 3.18 K/UL (ref 1–4.8)
LYMPHOCYTES NFR BLD: 32.2 % (ref 22–41)
MAGNESIUM SERPL-MCNC: 2.4 MG/DL (ref 1.5–2.5)
MCH RBC QN AUTO: 33.8 PG (ref 27–33)
MCHC RBC AUTO-ENTMCNC: 32.5 G/DL (ref 33.7–35.3)
MCV RBC AUTO: 104.2 FL (ref 81.4–97.8)
MICRO URNS: ABNORMAL
MONOCYTES # BLD AUTO: 0.82 K/UL (ref 0–0.85)
MONOCYTES NFR BLD AUTO: 8.3 % (ref 0–13.4)
MUCOUS THREADS #/AREA URNS HPF: ABNORMAL /HPF
NEUTROPHILS # BLD AUTO: 5.67 K/UL (ref 1.82–7.42)
NEUTROPHILS NFR BLD: 57.3 % (ref 44–72)
NITRITE UR QL STRIP.AUTO: NEGATIVE
NRBC # BLD AUTO: 0 K/UL
NRBC BLD-RTO: 0 /100 WBC
PH UR STRIP.AUTO: 6.5 [PH] (ref 5–8)
PLATELET # BLD AUTO: 176 K/UL (ref 164–446)
PMV BLD AUTO: 8.4 FL (ref 9–12.9)
POTASSIUM SERPL-SCNC: 5 MMOL/L (ref 3.6–5.5)
PROT SERPL-MCNC: 6.6 G/DL (ref 6–8.2)
PROT UR QL STRIP: 100 MG/DL
RBC # BLD AUTO: 3.34 M/UL (ref 4.7–6.1)
RBC UR QL AUTO: NEGATIVE
SODIUM SERPL-SCNC: 138 MMOL/L (ref 135–145)
SP GR UR STRIP.AUTO: 1.01
TROPONIN T SERPL-MCNC: 46 NG/L (ref 6–19)
WBC # BLD AUTO: 9.9 K/UL (ref 4.8–10.8)
WBC #/AREA URNS HPF: ABNORMAL /HPF

## 2020-04-03 PROCEDURE — 93005 ELECTROCARDIOGRAM TRACING: CPT | Performed by: EMERGENCY MEDICINE

## 2020-04-03 PROCEDURE — 81001 URINALYSIS AUTO W/SCOPE: CPT

## 2020-04-03 PROCEDURE — 99223 1ST HOSP IP/OBS HIGH 75: CPT | Mod: AI | Performed by: INTERNAL MEDICINE

## 2020-04-03 PROCEDURE — 99285 EMERGENCY DEPT VISIT HI MDM: CPT

## 2020-04-03 PROCEDURE — 700105 HCHG RX REV CODE 258: Performed by: EMERGENCY MEDICINE

## 2020-04-03 PROCEDURE — 83735 ASSAY OF MAGNESIUM: CPT

## 2020-04-03 PROCEDURE — 700101 HCHG RX REV CODE 250: Performed by: EMERGENCY MEDICINE

## 2020-04-03 PROCEDURE — 84484 ASSAY OF TROPONIN QUANT: CPT

## 2020-04-03 PROCEDURE — 770006 HCHG ROOM/CARE - MED/SURG/GYN SEMI*

## 2020-04-03 PROCEDURE — 303105 HCHG CATHETER EXTRA

## 2020-04-03 PROCEDURE — 70450 CT HEAD/BRAIN W/O DYE: CPT

## 2020-04-03 PROCEDURE — 72192 CT PELVIS W/O DYE: CPT

## 2020-04-03 PROCEDURE — 51702 INSERT TEMP BLADDER CATH: CPT

## 2020-04-03 PROCEDURE — 71045 X-RAY EXAM CHEST 1 VIEW: CPT

## 2020-04-03 PROCEDURE — 85025 COMPLETE CBC W/AUTO DIFF WBC: CPT

## 2020-04-03 PROCEDURE — 80053 COMPREHEN METABOLIC PANEL: CPT

## 2020-04-03 RX ORDER — LABETALOL HYDROCHLORIDE 5 MG/ML
10 INJECTION, SOLUTION INTRAVENOUS EVERY 4 HOURS PRN
Status: DISCONTINUED | OUTPATIENT
Start: 2020-04-03 | End: 2020-04-06 | Stop reason: HOSPADM

## 2020-04-03 RX ORDER — POLYETHYLENE GLYCOL 3350 17 G/17G
1 POWDER, FOR SOLUTION ORAL
Status: DISCONTINUED | OUTPATIENT
Start: 2020-04-03 | End: 2020-04-06 | Stop reason: HOSPADM

## 2020-04-03 RX ORDER — SODIUM CHLORIDE 9 MG/ML
1000 INJECTION, SOLUTION INTRAVENOUS ONCE
Status: COMPLETED | OUTPATIENT
Start: 2020-04-03 | End: 2020-04-04

## 2020-04-03 RX ORDER — ONDANSETRON 4 MG/1
4 TABLET, ORALLY DISINTEGRATING ORAL EVERY 4 HOURS PRN
Status: DISCONTINUED | OUTPATIENT
Start: 2020-04-03 | End: 2020-04-06 | Stop reason: HOSPADM

## 2020-04-03 RX ORDER — LIDOCAINE HYDROCHLORIDE 20 MG/ML
JELLY TOPICAL ONCE
Status: COMPLETED | OUTPATIENT
Start: 2020-04-03 | End: 2020-04-03

## 2020-04-03 RX ORDER — AMOXICILLIN 250 MG
2 CAPSULE ORAL 2 TIMES DAILY
Status: DISCONTINUED | OUTPATIENT
Start: 2020-04-03 | End: 2020-04-06 | Stop reason: HOSPADM

## 2020-04-03 RX ORDER — ACETAMINOPHEN 325 MG/1
650 TABLET ORAL EVERY 6 HOURS PRN
Status: DISCONTINUED | OUTPATIENT
Start: 2020-04-03 | End: 2020-04-06 | Stop reason: HOSPADM

## 2020-04-03 RX ORDER — SODIUM CHLORIDE, SODIUM LACTATE, POTASSIUM CHLORIDE, CALCIUM CHLORIDE 600; 310; 30; 20 MG/100ML; MG/100ML; MG/100ML; MG/100ML
INJECTION, SOLUTION INTRAVENOUS CONTINUOUS
Status: DISCONTINUED | OUTPATIENT
Start: 2020-04-03 | End: 2020-04-05

## 2020-04-03 RX ORDER — OMEPRAZOLE 20 MG/1
20 CAPSULE, DELAYED RELEASE ORAL DAILY
Status: DISCONTINUED | OUTPATIENT
Start: 2020-04-04 | End: 2020-04-06 | Stop reason: HOSPADM

## 2020-04-03 RX ORDER — ONDANSETRON 2 MG/ML
4 INJECTION INTRAMUSCULAR; INTRAVENOUS EVERY 4 HOURS PRN
Status: DISCONTINUED | OUTPATIENT
Start: 2020-04-03 | End: 2020-04-06 | Stop reason: HOSPADM

## 2020-04-03 RX ORDER — CITALOPRAM 20 MG/1
10 TABLET ORAL DAILY
Status: DISCONTINUED | OUTPATIENT
Start: 2020-04-04 | End: 2020-04-06 | Stop reason: HOSPADM

## 2020-04-03 RX ORDER — BISACODYL 10 MG
10 SUPPOSITORY, RECTAL RECTAL
Status: DISCONTINUED | OUTPATIENT
Start: 2020-04-03 | End: 2020-04-06 | Stop reason: HOSPADM

## 2020-04-03 RX ADMIN — LIDOCAINE HYDROCHLORIDE 5 ML: 20 JELLY TOPICAL at 22:30

## 2020-04-03 RX ADMIN — SODIUM CHLORIDE 1000 ML: 900 INJECTION, SOLUTION INTRAVENOUS at 21:15

## 2020-04-03 ASSESSMENT — FIBROSIS 4 INDEX: FIB4 SCORE: 2.25

## 2020-04-04 ENCOUNTER — APPOINTMENT (OUTPATIENT)
Dept: RADIOLOGY | Facility: MEDICAL CENTER | Age: 85
DRG: 699 | End: 2020-04-04
Attending: INTERNAL MEDICINE
Payer: MEDICARE

## 2020-04-04 LAB
ANION GAP SERPL CALC-SCNC: 12 MMOL/L (ref 7–16)
BUN SERPL-MCNC: 52 MG/DL (ref 8–22)
CALCIUM SERPL-MCNC: 8.8 MG/DL (ref 8.4–10.2)
CHLORIDE SERPL-SCNC: 102 MMOL/L (ref 96–112)
CO2 SERPL-SCNC: 25 MMOL/L (ref 20–33)
CREAT SERPL-MCNC: 1.76 MG/DL (ref 0.5–1.4)
ERYTHROCYTE [DISTWIDTH] IN BLOOD BY AUTOMATED COUNT: 52.4 FL (ref 35.9–50)
GLUCOSE SERPL-MCNC: 158 MG/DL (ref 65–99)
HCT VFR BLD AUTO: 35 % (ref 42–52)
HGB BLD-MCNC: 11.4 G/DL (ref 14–18)
MCH RBC QN AUTO: 34.1 PG (ref 27–33)
MCHC RBC AUTO-ENTMCNC: 32.6 G/DL (ref 33.7–35.3)
MCV RBC AUTO: 104.8 FL (ref 81.4–97.8)
PLATELET # BLD AUTO: 177 K/UL (ref 164–446)
PMV BLD AUTO: 8.9 FL (ref 9–12.9)
POTASSIUM SERPL-SCNC: 4.4 MMOL/L (ref 3.6–5.5)
PSA SERPL-MCNC: 758 NG/ML (ref 0–4)
RBC # BLD AUTO: 3.34 M/UL (ref 4.7–6.1)
SODIUM SERPL-SCNC: 139 MMOL/L (ref 135–145)
WBC # BLD AUTO: 9.9 K/UL (ref 4.8–10.8)

## 2020-04-04 PROCEDURE — 85027 COMPLETE CBC AUTOMATED: CPT

## 2020-04-04 PROCEDURE — 700105 HCHG RX REV CODE 258: Performed by: INTERNAL MEDICINE

## 2020-04-04 PROCEDURE — 99497 ADVNCD CARE PLAN 30 MIN: CPT | Performed by: INTERNAL MEDICINE

## 2020-04-04 PROCEDURE — 72148 MRI LUMBAR SPINE W/O DYE: CPT

## 2020-04-04 PROCEDURE — 97535 SELF CARE MNGMENT TRAINING: CPT

## 2020-04-04 PROCEDURE — 770001 HCHG ROOM/CARE - MED/SURG/GYN PRIV*

## 2020-04-04 PROCEDURE — 36415 COLL VENOUS BLD VENIPUNCTURE: CPT

## 2020-04-04 PROCEDURE — 72146 MRI CHEST SPINE W/O DYE: CPT

## 2020-04-04 PROCEDURE — 700111 HCHG RX REV CODE 636 W/ 250 OVERRIDE (IP): Performed by: INTERNAL MEDICINE

## 2020-04-04 PROCEDURE — 84153 ASSAY OF PSA TOTAL: CPT

## 2020-04-04 PROCEDURE — 80048 BASIC METABOLIC PNL TOTAL CA: CPT

## 2020-04-04 PROCEDURE — 99233 SBSQ HOSP IP/OBS HIGH 50: CPT | Mod: 25 | Performed by: INTERNAL MEDICINE

## 2020-04-04 PROCEDURE — A9270 NON-COVERED ITEM OR SERVICE: HCPCS | Performed by: INTERNAL MEDICINE

## 2020-04-04 PROCEDURE — 97166 OT EVAL MOD COMPLEX 45 MIN: CPT

## 2020-04-04 PROCEDURE — 700102 HCHG RX REV CODE 250 W/ 637 OVERRIDE(OP): Performed by: INTERNAL MEDICINE

## 2020-04-04 RX ORDER — TAMSULOSIN HYDROCHLORIDE 0.4 MG/1
0.4 CAPSULE ORAL
Status: DISCONTINUED | OUTPATIENT
Start: 2020-04-04 | End: 2020-04-06 | Stop reason: HOSPADM

## 2020-04-04 RX ORDER — LORAZEPAM 2 MG/ML
2 INJECTION INTRAMUSCULAR ONCE
Status: COMPLETED | OUTPATIENT
Start: 2020-04-04 | End: 2020-04-04

## 2020-04-04 RX ORDER — HALOPERIDOL 5 MG/ML
2-5 INJECTION INTRAMUSCULAR EVERY 4 HOURS PRN
Status: DISCONTINUED | OUTPATIENT
Start: 2020-04-04 | End: 2020-04-06 | Stop reason: HOSPADM

## 2020-04-04 RX ADMIN — SODIUM CHLORIDE, POTASSIUM CHLORIDE, SODIUM LACTATE AND CALCIUM CHLORIDE: 600; 310; 30; 20 INJECTION, SOLUTION INTRAVENOUS at 00:49

## 2020-04-04 RX ADMIN — TAMSULOSIN HYDROCHLORIDE 0.4 MG: 0.4 CAPSULE ORAL at 08:54

## 2020-04-04 RX ADMIN — SODIUM CHLORIDE, POTASSIUM CHLORIDE, SODIUM LACTATE AND CALCIUM CHLORIDE: 600; 310; 30; 20 INJECTION, SOLUTION INTRAVENOUS at 14:03

## 2020-04-04 RX ADMIN — LORAZEPAM 2 MG: 2 INJECTION INTRAMUSCULAR; INTRAVENOUS at 10:51

## 2020-04-04 RX ADMIN — HALOPERIDOL LACTATE 5 MG: 5 INJECTION, SOLUTION INTRAMUSCULAR at 01:04

## 2020-04-04 ASSESSMENT — COGNITIVE AND FUNCTIONAL STATUS - GENERAL
SUGGESTED CMS G CODE MODIFIER DAILY ACTIVITY: CL
MOVING FROM LYING ON BACK TO SITTING ON SIDE OF FLAT BED: A LITTLE
SUGGESTED CMS G CODE MODIFIER DAILY ACTIVITY: CK
TOILETING: A LITTLE
HELP NEEDED FOR BATHING: A LITTLE
DRESSING REGULAR UPPER BODY CLOTHING: A LITTLE
HELP NEEDED FOR BATHING: A LOT
DRESSING REGULAR LOWER BODY CLOTHING: A LITTLE
MOVING TO AND FROM BED TO CHAIR: A LOT
EATING MEALS: A LITTLE
DAILY ACTIVITIY SCORE: 18
PERSONAL GROOMING: A LITTLE
EATING MEALS: A LITTLE
DRESSING REGULAR UPPER BODY CLOTHING: A LOT
CLIMB 3 TO 5 STEPS WITH RAILING: A LITTLE
DRESSING REGULAR LOWER BODY CLOTHING: A LOT
TOILETING: A LOT
DAILY ACTIVITIY SCORE: 13
TURNING FROM BACK TO SIDE WHILE IN FLAT BAD: A LOT
STANDING UP FROM CHAIR USING ARMS: A LITTLE
PERSONAL GROOMING: A LOT

## 2020-04-04 ASSESSMENT — PATIENT HEALTH QUESTIONNAIRE - PHQ9
2. FEELING DOWN, DEPRESSED, IRRITABLE, OR HOPELESS: NOT AT ALL
SUM OF ALL RESPONSES TO PHQ9 QUESTIONS 1 AND 2: 0
1. LITTLE INTEREST OR PLEASURE IN DOING THINGS: NOT AT ALL

## 2020-04-04 ASSESSMENT — ACTIVITIES OF DAILY LIVING (ADL): TOILETING: UNABLE TO DETERMINE AT THIS TIME

## 2020-04-04 NOTE — PROGRESS NOTES
Hospitalist MD made rounds, seen pt and aware of pts urine output from his fajardo cath tubing. Hospitalist mentioned just to observe pts  urine output for clots, and to  Re-paged him again if clots occurs.

## 2020-04-04 NOTE — PROGRESS NOTES
Pt refusing all morning medications. RN educated pt on risks of non-compliance. Pt still refusing.

## 2020-04-04 NOTE — ED PROVIDER NOTES
ED Provider Note    CHIEF COMPLAINT  Chief Complaint   Patient presents with   • Hip Pain   • Abdominal Pain     HPI  Mr. Huerta is a 89-year-old male who presents emergency room for evaluation of possible altered mentation and hip discomfort.  The patient has some element of dementia and is a poor historian.  Caregiver that had brought the patient into triage states that he fell approximately 2 days ago and had reported having left-sided hip discomfort at that time and was having difficulty walking.  There was also concerned that the patient had been slightly more confused beyond his baseline and they thought that his diaper may have smelled like a possible urinary tract infection.  The patient denies any acute pain complaints on my initial assessment, denies any nausea, vomiting or diarrhea and feels like he can urinate without difficulty.  He is limited in his ability to provide history    REVIEW OF SYSTEMS  Limited secondary to the patient's baseline confusion.  Denies fevers or body aches, no acute chest pain or shortness of breath or hemoptysis.  Denies dizziness or abdominal pain.  See HPI for all other review of systems are negative    PAST MEDICAL HISTORY   has a past medical history of Cognitive impairment, Fracture of left femur (HCC), GERD (gastroesophageal reflux disease), History of asthma, Kidney disease, Lower urinary tract symptoms (2016), Prostate CA (HCC), Rib fracture, and Skin cancer.    SOCIAL HISTORY  Social History     Tobacco Use   • Smoking status: Former Smoker     Types: Cigarettes     Last attempt to quit: 1970     Years since quittin.8   • Smokeless tobacco: Never Used   • Tobacco comment:  ppd 40 years; stopped ; still chews   Substance and Sexual Activity   • Alcohol use: No     Alcohol/week: 0.0 oz   • Drug use: No   • Sexual activity: Never       SURGICAL HISTORY   has a past surgical history that includes appendectomy; tonsillectomy; other orthopedic surgery;  "other; and cataract extraction with iol.    CURRENT MEDICATIONS  Home Medications     Reviewed by Iglesia [place] R.N. (Registered Nurse) on 04/03/20 at 2000  Med List Status: Not Addressed   Medication Last Dose Status   acetaminophen (TYLENOL) 325 MG Tab  Active   citalopram (CELEXA) 10 MG tablet  Active   Cyanocobalamin (VITAMIN B-12) 1000 MCG Tab  Active   Docusate Calcium (STOOL SOFTENER PO)  Active   melatonin 3 MG Tab  Active   omeprazole (PRILOSEC) 20 MG delayed-release capsule  Active              ALLERGIES  No Known Allergies    PHYSICAL EXAM  VITAL SIGNS: /50   Pulse (!) 59   Temp 37.3 °C (99.1 °F) (Temporal)   Resp 17   Ht 1.473 m (4' 10\")   Wt 49.5 kg (109 lb 0.3 oz)   SpO2 95%   BMI 22.78 kg/m²   Pulse ox interpretation: I interpret this pulse ox as normal.  Genl: pleasantly demented elderly Male, sitting in gurney comfortably, speaking clearly, appears in no acute distress  Head: NC/AT   ENT: Mucous membranes dry, posterior pharynx clear, uvula midline, nares patent bilaterally   Eyes: Normal sclera, pupils equal round reactive to light  Neck: Supple, FROM, no LAD appreciated   Pulmonary: Lungs are clear to auscultation bilaterally  Chest: No TTP  CV:  RRR, no murmur appreciated, pulses 2+ in both upper and lower extremities,  Abdomen: soft, grimacing with palpation of the Suprapubic region.  Pelvis Sable.  ND; no rebound/guarding, no masses palpated, no HSM   : no CVA or suprapubic tenderness   Musculoskeletal: Pain free ROM of the neck. Moving upper and lower extremities and spontaneous in coordinated fashion  Neuro: A&Ox4 (person, place, time, situation), speech fluent, gait not assessed, no focal deficits appreciated, No cerebellar signs. Sensation is grossly intact in the distal upper and lower extremities.  5/5 strength in  and dorsiflexion/plantar flexion of the ankles.  Mild pain with internal and external rotation of left leg  Psych: Patient has an appropriate affect and " behavior  Skin: No rash or lesions.  No pallor or jaundice.  No cyanosis.  Warm and dry.     DIAGNOSTIC STUDIES / PROCEDURES    EKG  EKG at 2030 shows: rate of 54, rhythm sinus bradycardia, axis normal, intervals normal, QRS narrow, ST/T waves without acute ischemia    LABS  Labs Reviewed   CBC WITH DIFFERENTIAL - Abnormal; Notable for the following components:       Result Value    RBC 3.34 (*)     Hemoglobin 11.3 (*)     Hematocrit 34.8 (*)     .2 (*)     MCH 33.8 (*)     MCHC 32.5 (*)     RDW 52.8 (*)     MPV 8.4 (*)     All other components within normal limits   COMP METABOLIC PANEL - Abnormal; Notable for the following components:    Glucose 125 (*)     Bun 59 (*)     Creatinine 1.90 (*)     Alkaline Phosphatase 184 (*)     All other components within normal limits   TROPONIN - Abnormal; Notable for the following components:    Troponin T 46 (*)     All other components within normal limits   ESTIMATED GFR - Abnormal; Notable for the following components:    GFR If  40 (*)     GFR If Non  33 (*)     All other components within normal limits   MAGNESIUM   URINALYSIS,CULTURE IF INDICATED   URINE MICROSCOPIC (W/UA)     RADIOLOGY  DX-CHEST-PORTABLE (1 VIEW)   Final Result      Suspicious for a right seventh posterior rib osteoblastic metastasis. Recommend clinical correlation, including PSA      Otherwise stable chest with aortic ectasia but no consolidation      CT-PELVIS W/O   Final Result      1.  Inferior right pubic ramus subacute fracture with slightly expansile bony change. This is adjacent to an area of bony sclerosis. This is worrisome for an osteolytic metastasis. An isolated inferior ramus subacute fracture is possible but would be    uncommon unless there were a direct blow to this region. Recommend clinical correlation      2.  Right parasymphyseal and left inferior pubic ramus sclerosis raises possibility of osteoblastic metastases.      3.  Severely enlarged  prostate gland could be from BPH. Underlying prostate carcinoma is possible. There is evidence of chronic bladder outlet obstruction with severe trabeculation, moderate to severe bladder dilatation, multiple diverticula. Mucosal    sessile malignancy not excluded      4.  Mild circumferential rectal wall thickening could be from increased distention although recommend clinical correlation for guaiac positive stool as malignancy or proctitis are in the differential      CT-HEAD W/O   Final Result      No noncontrast CT evidence of acute intracranial hemorrhage.      Stable moderate white matter hypodensity is present.  This is a nonspecific finding which usually is found to represent chronic microvascular disease in patient's of this demographic.  Demyelination, age indeterminant ischemia and gliosis are also common    possibilities.      Age-appropriate cerebral volume loss.      MR-LUMBAR SPINE-W/O    (Results Pending)   MR-THORACIC SPINE-W/O    (Results Pending)     COURSE & MEDICAL DECISION MAKING  Pertinent Labs & Imaging studies reviewed. (See chart for details)    DDX  UTI  Sepsis  Metabolic derangement  Kidney dysfunction  SDH/closed head injury  dysrhythmia  Hypoglycemia  Dehydration  Polypharmacy  Medication side efect  Substance abuse    MDM    Initial evaluation at 2030:  Patient presents emergency room for symptoms as described above.  The patient is chronically but pleasantly demented and at the time of my evaluation the majority of the history comes from the patient's caregiver.  Chart review was excessively reviewed as the patient has multiple chronic problems that have likely not been treated to the patient's advanced age and wishes.  My initial concern after my primary assessment is the presumed diagnosis of prostate cancer and the possibility of a bladder outlet obstruction, UTI or progression to kidney infection or pyelonephritis along with the possibility of metastatic lesions involving the  pelvis and causing the patient's associated pain with standing.    Labs have been obtained for the differential as described above.  It is noted that he has a anemia, macrocytic, with borderline thrombo-osman.  Patient has elevated creatinine and appears clinically dehydrated.  IV fluid resuscitation was pursued.  He has an elevated troponin of 46 which may be due to his impaired creatinine and clearance/decreased GFR.  There is no acute evidence of ischemia or infarction on EKG.  Urinalysis was eventually obtained after the placement of a Conley catheter due to difficulty with urinary retention.  Chest x-ray shows concern for osteoblastic lesion.  This is similar with the CT of the pelvis which also shows multiple areas of small osteolytic and osteoblastic lesions in the pelvic ring.  There is no areas of fracture to suggest need for emergent orthopedic evaluation and some of these may be acute on chronic in nature.  There is evidence of chronic urinary obstruction.    Urologic consultation is obtained and they would recommend that while he has an enlarged prostate that may be causing an outlet obstruction we cannot exclude the possibility of cord compression secondary to metastatic lesions.  He would recommend admission and treatment of the ANNE-MARIE in addition to MRI of the thoracic and lumbar spine.  This is discussed with the patient who has limited understanding and initial phone call to the medical power of , daughter, is initially not returned.  The patient is understanding of the need for admission and case is discussed with hospitalist who will evaluate the patient for admission.    CRITICAL CARE:  I saw and evaluated this patient. I personally provided 30 minutes of critical care time to the patient excluding billable procedures and directly and personally provided the following treatment and critical care management:  Critical Care Interventions  Multispecialty coordination, Multiple bedside  assessments, coordination of care with family and other historical sources, Serial neurologic exams and Fluid resuscitation      HYDRATION: Based on the patient's presentation of Stefani QUINTERO the patient was given IV fluids. IV Hydration was used because oral hydration was not adequate alone. Upon recheck following hydration, the patient was improved.    FINAL IMPRESSION  Visit Diagnoses     ICD-10-CM   1. ANNE-MARIE (acute kidney injury) (HCC) N17.9   2. Dehydration E86.0   3. Hip pain M25.559   4. 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

## 2020-04-04 NOTE — H&P
Hospital Medicine History & Physical Note    Date of Service  4/3/2020    Primary Care Physician  Stacy Cm M.D.    Consultants  Urology    Code Status  Full, unable to have competent conversation with him about this    Chief Complaint  Abdominal pain    History of Presenting Illness  89 y.o. male who presented 4/3/2020 with abdominal and hip pain.  Patient does not remember why he is here, he is unable to give me any information.  Apparently patient has been having difficulty urinating and having abdominal and hip pain.  Upon arrival, he was noted to have bladder outlet obstruction as well as changes concerning for metastatic lesions on his hips.  Urology was consulted by ERP, they did request MRI of the thoracic and lumbar spine.  I did discuss the case including labs and imaging with the ER physician.    Review of Systems  Review of Systems   Unable to perform ROS: Dementia       Past Medical History   has a past medical history of Cognitive impairment, Fracture of left femur (HCC), GERD (gastroesophageal reflux disease), History of asthma, Kidney disease, Lower urinary tract symptoms (7/7/2016), Prostate CA (HCC), Rib fracture, and Skin cancer.    Surgical History   has a past surgical history that includes appendectomy; tonsillectomy; other orthopedic surgery; other; and cataract extraction with iol.     Family History  Unable to obtain due to patient's current mental status    Social History   reports that he quit smoking about 49 years ago. His smoking use included cigarettes. He has never used smokeless tobacco. He reports that he does not drink alcohol or use drugs.    Allergies  No Known Allergies    Medications  Prior to Admission Medications   Prescriptions Last Dose Informant Patient Reported? Taking?   Cyanocobalamin (VITAMIN B-12) 1000 MCG Tab   Yes No   Sig: Take 1,000 mcg by mouth every day.   Docusate Calcium (STOOL SOFTENER PO)   Yes No   Sig: Take 1 Tab by mouth 2 Times a Day.   acetaminophen  (TYLENOL) 325 MG Tab   Yes No   Sig: Take 325 mg by mouth every 6 hours as needed for Moderate Pain.   citalopram (CELEXA) 10 MG tablet   No No   Sig: TAKE ONE TABLET BY MOUTH EVERY NIGHT AT BEDTIME.   melatonin 3 MG Tab   Yes No   Sig: Take 3 mg by mouth every bedtime.   omeprazole (PRILOSEC) 20 MG delayed-release capsule   No No   Sig: TAKE ONE CAPSULE BY MOUTH ONE TIME DAILY       Facility-Administered Medications: None       Physical Exam  Temp:  [37.3 °C (99.1 °F)] 37.3 °C (99.1 °F)  Pulse:  [59] 59  Resp:  [17] 17  BP: (140)/(50) 140/50  SpO2:  [95 %] 95 %    Physical Exam  Vitals signs and nursing note reviewed.   Constitutional:       General: He is not in acute distress.     Appearance: He is well-developed. He is cachectic. He is not toxic-appearing or diaphoretic.   HENT:      Head: Normocephalic and atraumatic.      Right Ear: External ear normal.      Left Ear: External ear normal.      Nose: Nose normal. No congestion or rhinorrhea.      Mouth/Throat:      Mouth: Mucous membranes are dry.      Pharynx: No oropharyngeal exudate.   Eyes:      General: No scleral icterus.        Right eye: No discharge.         Left eye: No discharge.      Extraocular Movements: Extraocular movements intact.   Neck:      Musculoskeletal: Normal range of motion and neck supple. No edema or erythema.      Trachea: No tracheal deviation.   Cardiovascular:      Rate and Rhythm: Normal rate and regular rhythm.      Heart sounds: No murmur. No friction rub. No gallop.    Pulmonary:      Effort: Pulmonary effort is normal. No respiratory distress.      Breath sounds: Normal breath sounds. No stridor. No wheezing or rales.   Abdominal:      General: Bowel sounds are normal. There is no distension.      Palpations: Abdomen is soft.   Musculoskeletal:      Right lower leg: No edema.      Left lower leg: No edema.   Lymphadenopathy:      Cervical: No cervical adenopathy.   Skin:     General: Skin is warm and dry.      Findings: No  erythema or rash.   Neurological:      Comments: Awake, confused    Psychiatric:         Cognition and Memory: Cognition is impaired. Memory is impaired.         Laboratory:  Recent Labs     04/03/20 2040   WBC 9.9   RBC 3.34*   HEMOGLOBIN 11.3*   HEMATOCRIT 34.8*   .2*   MCH 33.8*   MCHC 32.5*   RDW 52.8*   PLATELETCT 176   MPV 8.4*     Recent Labs     04/03/20 2040   SODIUM 138   POTASSIUM 5.0   CHLORIDE 101   CO2 27   GLUCOSE 125*   BUN 59*   CREATININE 1.90*   CALCIUM 8.7     Recent Labs     04/03/20 2040   ALTSGPT 21   ASTSGOT 18   ALKPHOSPHAT 184*   TBILIRUBIN 0.6   GLUCOSE 125*         No results for input(s): NTPROBNP in the last 72 hours.      Recent Labs     04/03/20 2040   TROPONINT 46*       Urinalysis:    No results found     Imaging:  DX-CHEST-PORTABLE (1 VIEW)   Final Result      Suspicious for a right seventh posterior rib osteoblastic metastasis. Recommend clinical correlation, including PSA      Otherwise stable chest with aortic ectasia but no consolidation      CT-PELVIS W/O   Final Result      1.  Inferior right pubic ramus subacute fracture with slightly expansile bony change. This is adjacent to an area of bony sclerosis. This is worrisome for an osteolytic metastasis. An isolated inferior ramus subacute fracture is possible but would be    uncommon unless there were a direct blow to this region. Recommend clinical correlation      2.  Right parasymphyseal and left inferior pubic ramus sclerosis raises possibility of osteoblastic metastases.      3.  Severely enlarged prostate gland could be from BPH. Underlying prostate carcinoma is possible. There is evidence of chronic bladder outlet obstruction with severe trabeculation, moderate to severe bladder dilatation, multiple diverticula. Mucosal    sessile malignancy not excluded      4.  Mild circumferential rectal wall thickening could be from increased distention although recommend clinical correlation for guaiac positive stool as  malignancy or proctitis are in the differential      CT-HEAD W/O   Final Result      No noncontrast CT evidence of acute intracranial hemorrhage.      Stable moderate white matter hypodensity is present.  This is a nonspecific finding which usually is found to represent chronic microvascular disease in patient's of this demographic.  Demyelination, age indeterminant ischemia and gliosis are also common    possibilities.      Age-appropriate cerebral volume loss.      MR-LUMBAR SPINE-W/O    (Results Pending)   MR-THORACIC SPINE-W/O    (Results Pending)         Assessment/Plan:  I anticipate this patient will require at least two midnights for appropriate medical management, necessitating inpatient admission.    Urinary obstruction- (present on admission)  Assessment & Plan  -Due to prostate enlargement  -Conley to be placed  -Kidney function is slightly worse than baseline  -Repeat BMP in the morning  -Additional recommendations per urology    Prostate cancer (HCC)- (present on admission)  Assessment & Plan  -With concern for metastatic disease  -Also with urinary obstruction  -Urology recommended MRI of the thoracic and lumbar spine which has been ordered    Dementia ([facility])- (present on admission)  Assessment & Plan  -Chronic, also with depression, continue Celexa  -Likely at baseline  -I did personally review his CT head, noted no acute intracranial abnormalities    Anemia, unspecified- (present on admission)  Assessment & Plan  -No sign of gross bleeding  -Repeat CBC in the morning    CKD (chronic kidney disease) stage 4, GFR 15-29 ml/min ([facility] [place])- (present on admission)  Assessment & Plan  -Chronic, near baseline  -Repeat BMP in the morning    GERD (gastroesophageal reflux disease)- (present on admission)  Assessment & Plan  -Continue home omeprazole      VTE prophylaxis: SCDs

## 2020-04-04 NOTE — ED TRIAGE NOTES
Pt presents to ED with caregiver via wheelchair. Caregiver states pt fell 2 days ago and is experiencing hip pain. Caregiver states she believes pt may have UTI because he has been confused x3 days and keeps pointing to lower abdomen stating it does not feel good. Denies N/V/D or blood in urine. Afebrile at this time.  Can stand on own but has not been due to hip pain. Hard of hearing and not answering questions appropriately due to recent confusion.  Caregiver informed she cannot go to room with pt. Her cell number is on sticky note in pt's pocket for reference.

## 2020-04-04 NOTE — DISCHARGE PLANNING
Received Choice form at 1230  Agency/Facility Name: [facility]  Referral sent per Choice form @ 124

## 2020-04-04 NOTE — CONSULTS
UROLOGY Consult Note:    Jaam Angeles P.A.-C.  Date & Time note created:    4/4/2020   10:35 AM     Referring MD:  Dr. Freeman    Patient ID:   Name:             Emmanuel Huerta   YOB: 1921  Age:                 89 y.o.  male   MRN:               [id number]                                                             Reason for Consult:      Metastatic prostate cancer with retention and ANNE-MARIE    History of Present Illness:    Consult requested by Dr. Freeman for this 89 yo with dementia. He is not a good historian so his history is entirely obtained from chart review and discussion with his daughter, Wiley, who is his primary medical decision maker. He was diagnosed with prostate cancer 15-20 years ago, but was informed that it was a slow growing cancer and that he would likely pass away before the cancer caused any problems. He was been doing well from that standpoint, without any recent  follow up. In fact, his daughter had forgotten he even had the prostate cancer. However, he has a caregiver at home, and recently he was apparently reporting pain in his abdomen and hip along with difficulty voiding. For these reasons he was evaluated in the ER and was found to have urinary retention with ANNE-MARIE. CT did reveal sclerotic lesions in the pelvis. Urology was consulted to assist with management.     Review of Systems:      Unable to complete ROS due to dementia.   All other systems were reviewed and are negative (AMA/CMS criteria)                Past Medical History:   Past Medical History:   Diagnosis Date   • Cognitive impairment    • Fracture of left femur (HCC)    • GERD (gastroesophageal reflux disease)    • History of asthma    • Kidney disease     with 2/2 hyperPTH   • Lower urinary tract symptoms 7/7/2016   • Prostate CA (HCC)    • Rib fracture     personal history of fall on ice 2015   • Skin cancer      Active Hospital Problems    Diagnosis   • Urinary obstruction [N13.9]     Priority:  High   • Prostate cancer (HCC) [C61]     Priority: Medium   • Dementia (HCC) [F03.90]   • DNR (do not resuscitate) [Z66]   • Anemia, unspecified [D64.9]   • CKD (chronic kidney disease) stage 4, GFR 15-29 ml/min ([facility]) [N18.4]   • GERD (gastroesophageal reflux disease) [K21.9]       Past Surgical History:  Past Surgical History:   Procedure Laterality Date   • APPENDECTOMY     • CATARACT EXTRACTION WITH IOL     • OTHER      nasal growth removal x 2   • OTHER ORTHOPEDIC SURGERY      L femur repair   • TONSILLECTOMY         Hospital Medications:    Current Facility-Administered Medications:   •  haloperidol lactate (HALDOL) injection 2-5 mg, 2-5 mg, Intravenous, Q4HRS PRN, Yasmani Freeman D.O., 5 mg at 04/04/20 0104  •  LORazepam (ATIVAN) injection 2 mg, 2 mg, Intravenous, Once, Sofia Lincoln M.D.  •  tamsulosin (FLOMAX) capsule 0.4 mg, 0.4 mg, Oral, AFTER BREAKFAST, Sofia Lincoln M.D., 0.4 mg at 04/04/20 0854  •  citalopram (CELEXA) tablet 10 mg, 10 mg, Oral, DAILY, Yasmani Freeman D.O.  •  omeprazole (PRILOSEC) capsule 20 mg, 20 mg, Oral, DAILY, Yasmani Freeman D.O.  •  senna-docusate (PERICOLACE or SENOKOT S) 8.6-50 MG per tablet 2 Tab, 2 Tab, Oral, BID **AND** polyethylene glycol/lytes (MIRALAX) PACKET 1 Packet, 1 Packet, Oral, QDAY PRN **AND** magnesium hydroxide (MILK OF MAGNESIA) suspension 30 mL, 30 mL, Oral, QDAY PRN **AND** bisacodyl (DULCOLAX) suppository 10 mg, 10 mg, Rectal, QDAY PRN, Yasmani Freeman D.O.  •  lactated ringers infusion, , Intravenous, Continuous, Yasmani Freeman D.O., Last Rate: 83 mL/hr at 04/04/20 0049  •  acetaminophen (TYLENOL) tablet 650 mg, 650 mg, Oral, Q6HRS PRN, Yasmani Freeman D.O.  •  ondansetron (ZOFRAN) syringe/vial injection 4 mg, 4 mg, Intravenous, Q4HRS PRN, Yasmani Freeman D.O.  •  ondansetron (ZOFRAN ODT) dispertab 4 mg, 4 mg, Oral, Q4HRS PRN, Yasmani Freeman D.O.  •  labetalol (NORMODYNE/TRANDATE) injection 10 mg, 10 mg, Intravenous, Q4HRS PRN, Yasmani ALVA  BLAIR Freeman    Current Outpatient Medications:  Medications Prior to Admission   Medication Sig Dispense Refill Last Dose   • omeprazole (PRILOSEC) 20 MG delayed-release capsule TAKE ONE CAPSULE BY MOUTH ONE TIME DAILY  90 Cap 1    • citalopram (CELEXA) 10 MG tablet TAKE ONE TABLET BY MOUTH EVERY NIGHT AT BEDTIME. 90 Tab 1    • acetaminophen (TYLENOL) 325 MG Tab Take 325 mg by mouth every 6 hours as needed for Moderate Pain. 30 Tab  Taking   • melatonin 3 MG Tab Take 3 mg by mouth every bedtime.   Taking   • Docusate Calcium (STOOL SOFTENER PO) Take 1 Tab by mouth 2 Times a Day.   Taking   • Cyanocobalamin (VITAMIN B-12) 1000 MCG Tab Take 1,000 mcg by mouth every day.   Taking       Medication Allergy:  No Known Allergies    Family History:  No family history on file.    Social History:  Social History     Socioeconomic History   • Marital status:      Spouse name: Not on file   • Number of children: Not on file   • Years of education: Not on file   • Highest education level: Not on file   Occupational History   • Not on file   Social Needs   • Financial resource strain: Not on file   • Food insecurity     Worry: Not on file     Inability: Not on file   • Transportation needs     Medical: Not on file     Non-medical: Not on file   Tobacco Use   • Smoking status: Former Smoker     Types: Cigarettes     Last attempt to quit: 1970     Years since quittin.8   • Smokeless tobacco: Never Used   • Tobacco comment:  ppd 40 years; stopped ; still chews   Substance and Sexual Activity   • Alcohol use: No     Alcohol/week: 0.0 oz   • Drug use: No   • Sexual activity: Never   Lifestyle   • Physical activity     Days per week: Not on file     Minutes per session: Not on file   • Stress: Not on file   Relationships   • Social connections     Talks on phone: Not on file     Gets together: Not on file     Attends [employer] service: Not on file     Active member of club or organization: Not on file      "Attends meetings of clubs or organizations: Not on file     Relationship status: Not on file   • Intimate partner violence     Fear of current or ex partner: Not on file     Emotionally abused: Not on file     Physically abused: Not on file     Forced sexual activity: Not on file   Other Topics Concern   • Not on file   Social History Narrative    Lives in [facility] alone.      Michael SCHWARTZF.     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  /69   Pulse 74   Temp 36.8 °C (98.2 °F) (Axillary)   Resp 18   Ht 1.473 m (4' 10\")   Wt 49.5 kg (109 lb 0.3 oz)   SpO2 90%   Vitals:    20 2014 20 0002 20 0046 20 0500   BP:  (!) 165/79 158/58 142/69   Pulse:  (!) 54  74   Resp:  (!) 10 18 18   Temp:   36.7 °C (98 °F) 36.8 °C (98.2 °F)   TempSrc:   Axillary Axillary   SpO2:  97% 96% 90%   Weight: 49.5 kg (109 lb 0.3 oz)      Height:         Oxygen Therapy:  Pulse Oximetry: 90 %, O2 (LPM): 0, O2 Delivery Device: None - Room Air    Constitutional:   Thin, appears stated age. Eyes closed and has agitated movements in bed.   HENMT:  Normocephalic, Atraumatic, Oropharynx moist mucous membranes, No oral exudates, Nose normal.  No thyromegaly.  Eyes:  EOMI, Conjunctiva normal, No discharge.  Neck:  Normal range of motion, No cervical tenderness.  Cardiovascular:  Normal heart rate, Normal rhythm, No murmurs, No rubs, No gallops.   Extremitites with intact distal pulses, no cyanosis, or edema.  Lungs:  Normal breath sounds, breath sounds clear to auscultation bilaterally,  no crackles, no wheezing.   Abdomen: Bowel sounds normal, Soft, No tenderness, No guarding, No rebound, No masses, No hepatosplenomegaly.  : Conley in place draining cherry colored urine without " clots.   Skin: Warm, Dry, No erythema, No rash, no induration.  Neurologic: Confused      MDM (Data Review):     Records reviewed and summarized in current documentation    Lab Data Review:  Recent Results (from the past 24 hour(s))   CBC w/ Differential    Collection Time: 04/03/20  8:40 PM   Result Value Ref Range    WBC 9.9 4.8 - 10.8 K/uL    RBC 3.34 (L) 4.70 - 6.10 M/uL    Hemoglobin 11.3 (L) 14.0 - 18.0 g/dL    Hematocrit 34.8 (L) 42.0 - 52.0 %    .2 (H) 81.4 - 97.8 fL    MCH 33.8 (H) 27.0 - 33.0 pg    MCHC 32.5 (L) 33.7 - 35.3 g/dL    RDW 52.8 (H) 35.9 - 50.0 fL    Platelet Count 176 164 - 446 K/uL    MPV 8.4 (L) 9.0 - 12.9 fL    Neutrophils-Polys 57.30 44.00 - 72.00 %    Lymphocytes 32.20 22.00 - 41.00 %    Monocytes 8.30 0.00 - 13.40 %    Eosinophils 1.20 0.00 - 6.90 %    Basophils 0.30 0.00 - 1.80 %    Immature Granulocytes 0.70 0.00 - 0.90 %    Nucleated RBC 0.00 /100 WBC    Neutrophils (Absolute) 5.67 1.82 - 7.42 K/uL    Lymphs (Absolute) 3.18 1.00 - 4.80 K/uL    Monos (Absolute) 0.82 0.00 - 0.85 K/uL    Eos (Absolute) 0.12 0.00 - 0.51 K/uL    Baso (Absolute) 0.03 0.00 - 0.12 K/uL    Immature Granulocytes (abs) 0.07 0.00 - 0.11 K/uL    NRBC (Absolute) 0.00 K/uL   Complete Metabolic Panel (CMP)    Collection Time: 04/03/20  8:40 PM   Result Value Ref Range    Sodium 138 135 - 145 mmol/L    Potassium 5.0 3.6 - 5.5 mmol/L    Chloride 101 96 - 112 mmol/L    Co2 27 20 - 33 mmol/L    Anion Gap 10.0 7.0 - 16.0    Glucose 125 (H) 65 - 99 mg/dL    Bun 59 (H) 8 - 22 mg/dL    Creatinine 1.90 (H) 0.50 - 1.40 mg/dL    Calcium 8.7 8.4 - 10.2 mg/dL    AST(SGOT) 18 12 - 45 U/L    ALT(SGPT) 21 2 - 50 U/L    Alkaline Phosphatase 184 (H) 30 - 99 U/L    Total Bilirubin 0.6 0.1 - 1.5 mg/dL    Albumin 4.0 3.2 - 4.9 g/dL    Total Protein 6.6 6.0 - 8.2 g/dL    Globulin 2.6 1.9 - 3.5 g/dL    A-G Ratio 1.5 g/dL   Troponin STAT    Collection Time: 04/03/20  8:40 PM   Result Value Ref Range    Troponin T 46 (H) 6 - 19 ng/L    Magnesium    Collection Time: 20  8:40 PM   Result Value Ref Range    Magnesium 2.4 1.5 - 2.5 mg/dL   ESTIMATED GFR    Collection Time: 20  8:40 PM   Result Value Ref Range    GFR If  40 (A) >60 mL/min/1.73 m 2    GFR If Non  33 (A) >60 mL/min/1.73 m 2   EKG    Collection Time: 20  9:23 PM   Result Value Ref Range    Report       [facility] Emergency Dept.    Test Date:  2020  Pt Name:    LUIS M ARCINIEGA                Department: [facility]  MRN:        [id number]                      [facility]  Gender:     Male                         Technician: GT  :        [id number]                   Requested By:TOMER GOFF  Order #:    [id number]                    Reading MD:    Measurements  Intervals                                Axis  Rate:       54                           P:          59  NH:         158                          QRS:        262  QRSD:       88                           T:          24  QT:         451  QTc:        428    Interpretive Statements  Sinus bradycardia  Abnormal R-wave progression, late transition  Inferior infarct, old  Compared to ECG 10/10/2017 20:09:56  Myocardial infarct finding now present  Sinus rhythm no longer present  Left anterior fascicular block no longer present     URINALYSIS,CULTURE IF INDICATED    Collection Time: 20 10:43 PM   Result Value Ref Range    Color Yellow     Character Clear     Specific Gravity 1.015 <1.035    Ph 6.5 5.0 - 8.0    Glucose Negative Negative mg/dL    Ketones Negative Negative mg/dL    Protein 100 (A) Negative mg/dL    Bilirubin Negative Negative    Nitrite Negative Negative    Leukocyte Esterase Negative Negative    Occult Blood Negative Negative    Micro Urine Req Microscopic    URINE MICROSCOPIC (W/UA)    Collection Time: 20 10:43 PM   Result Value Ref Range    WBC 0-2 (A) /hpf    Mucous Threads Few /hpf    Hyaline Cast 0-2 /lpf   CBC without  Differential    Collection Time: 04/04/20  3:18 AM   Result Value Ref Range    WBC 9.9 4.8 - 10.8 K/uL    RBC 3.34 (L) 4.70 - 6.10 M/uL    Hemoglobin 11.4 (L) 14.0 - 18.0 g/dL    Hematocrit 35.0 (L) 42.0 - 52.0 %    .8 (H) 81.4 - 97.8 fL    MCH 34.1 (H) 27.0 - 33.0 pg    MCHC 32.6 (L) 33.7 - 35.3 g/dL    RDW 52.4 (H) 35.9 - 50.0 fL    Platelet Count 177 164 - 446 K/uL    MPV 8.9 (L) 9.0 - 12.9 fL   Basic Metabolic Panel (BMP)    Collection Time: 04/04/20  3:18 AM   Result Value Ref Range    Sodium 139 135 - 145 mmol/L    Potassium 4.4 3.6 - 5.5 mmol/L    Chloride 102 96 - 112 mmol/L    Co2 25 20 - 33 mmol/L    Glucose 158 (H) 65 - 99 mg/dL    Bun 52 (H) 8 - 22 mg/dL    Creatinine 1.76 (H) 0.50 - 1.40 mg/dL    Calcium 8.8 8.4 - 10.2 mg/dL    Anion Gap 12.0 7.0 - 16.0   ESTIMATED GFR    Collection Time: 04/04/20  3:18 AM   Result Value Ref Range    GFR If  43 (A) >60 mL/min/1.73 m 2    GFR If Non  36 (A) >60 mL/min/1.73 m 2       Imaging/Procedures Review:    Reviewed    MDM (Assessment and Plan):     A 89 yo male with urinary retention and ANNE-MARIE with new bony metastatic lesions in the pelvis. I do not believe he has ever had a prostate biopsy to confirm prostate cancer, but with a PSA of 143 and sclerotic bony lesions we can quite accurately presume this is metastatic prostate cancer. I discussed options with his daughter which would include obtaining a tissue diagnosis via biopsy of one of the bony lesions. She would not like to proceed with that. We did discuss beginning ADT with Lupron with the presumptive diagnosis and she will give some thought to that. If she is in favor of this option, then we would begin Lupron in our office. Alternatively, patient could consider Hospice care. After discussion with the Hospitalist, this was considered in the past but there was apparently lack of qualifying criteria. A Hospice consult will be requested to consider now. An MRI of the T and  LS spine has been ordered. Conley can remain. He has developed gross hematuria, which is not too unusual following decompression of a distended bladder, but we will need to monitor and consider CBI if not improving or if having difficultly with clot obstruction. Renal functions have improved. Thank you for this consult. Case discussed with patient's daughter, Hospitalist and Urology-Dr. Denny who has directed this plan of care.

## 2020-04-04 NOTE — DISCHARGE PLANNING
Care Transition Team Assessment    Called pt's daughter Wiley, [phone] to complete assessment and discuss discharge plan. Pt lives at [facility]. Wiley states that prior to admission, pt was using a FWW and was fairly independent with his ADL's. Pt has a caregiver, Carmen who comes in and provides assistance with medications and ADL's as needed. Wiley unable to state how many hours Carmen visits and says it varies depending on pt's needs for that day. Carmen is unable to provide 24/7 care, therefore family is looking for other care giving options as Wiley anticipates pt will require 24/7 care upon d/c. She states she is considering hospice and would like a referral initiated to [facility].  Wiley lives in [place] and is pt's POA.    Choice form complted and faxed.     Hospice RN Emily ([phone]) aware of referral and has already been in contact with pt's daughter.       Information Source  Orientation : Disoriented to Event, Disoriented to Place, Disoriented to Time  Information Given By: Relative  Informant's Name: Wiley  Who is responsible for making decisions for patient? : POA  Name(s) of Primary Decision Maker: Wiley     Readmission Evaluation  Is this a readmission?: No    Elopement Risk  Legal Hold: No  Ambulatory or Self Mobile in Wheelchair: No-Not an Elopement Risk  Elopement Risk: Not at Risk for Elopement    Interdisciplinary Discharge Planning  Lives with - Patient's Self Care Capacity: Attendant / Paid Care Giver  Prior Services: Continuous (24 Hour) Care Giving Per Service    Discharge Preparedness  What is your plan after discharge?: Uncertain - pending medical team collaboration  What are your discharge supports?: Child(caregiver)  Difficulity with ADLs: Bathing, Brushing teeth, Dressing, Toileting, Walking  Difficulity with IADLs: Cooking, Driving, Keeping track of finances, Laundry, Managing medication, Shopping    Finances  Financial Barriers to  Discharge: No  Prescription Coverage: No    Vision / Hearing Impairment  Right Eye Vision: Impaired, Wears Glasses  Left Eye Vision: Impaired, Wears Glasses    Advance Directive  Advance Directive?: DPOA for Health Care, POLST    Domestic Abuse  Have you ever been the victim of abuse or violence?: No    Psychological Assessment  History of Substance Abuse: None  History of Psychiatric Problems: No  Non-compliant with Treatment: No  Newly Diagnosed Illness: Yes    Discharge Risks or Barriers  Discharge risks or barriers?: No    Anticipated Discharge Information  Anticipated discharge disposition: Home, Hospice

## 2020-04-04 NOTE — PROGRESS NOTES
Hospital Medicine Daily Progress Note    Date of Service  4/4/2020    Chief Complaint  89 y.o. male admitted 4/3/2020 with low abdominal pain    Hospital Course    This is a 89 y.o. male who presented 4/3/2020 with abdominal and hip pain.  He was brought in by his caregiver who noticed the patient was more confused than normal and he was grabbing his lower abdomen complaining of pain. He did fall two days prior to presentation. Apparently patient has been having difficulty urinating.  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  Urology was consulted by the emergency physician, they did request MRI of the thoracic and lumbar spine. He had a fajardo catheter placed and was started on iv fluids.      Interval Problem Update  The patient is confused overnight, he is declining medication  Fajardo catheter is in place    Consultants/Specialty  urology    Code Status  DNR confirmed    Disposition  Home with caregiver    Review of Systems  Review of Systems   Unable to perform ROS: Dementia        Physical Exam  Temp:  [36.7 °C (98 °F)-37.3 °C (99.1 °F)] 36.8 °C (98.2 °F)  Pulse:  [54-74] 74  Resp:  [10-18] 18  BP: (140-165)/(50-79) 142/69  SpO2:  [90 %-97 %] 90 %    Physical Exam  Vitals signs and nursing note reviewed.   Constitutional:       General: He is not in acute distress.  HENT:      Nose: Nose normal. No congestion.      Mouth/Throat:      Mouth: Mucous membranes are moist.      Pharynx: Oropharynx is clear.   Eyes:      General: No scleral icterus.     Conjunctiva/sclera: Conjunctivae normal.      Pupils: Pupils are equal, round, and reactive to light.   Neck:      Musculoskeletal: Neck supple. No neck rigidity.   Cardiovascular:      Rate and Rhythm: Normal rate and regular rhythm.      Heart sounds: Normal heart sounds. No friction rub.   Pulmonary:      Effort: No respiratory distress.      Breath sounds: No stridor. No  wheezing.   Abdominal:      General: Bowel sounds are normal. There is no distension.      Tenderness: There is no abdominal tenderness.   Musculoskeletal:         General: No tenderness.   Skin:     General: Skin is warm.      Coloration: Skin is not jaundiced.   Neurological:      Mental Status: He is alert. Mental status is at baseline. He is disoriented.      Motor: No weakness.   Psychiatric:         Mood and Affect: Mood normal.         Cognition and Memory: Cognition is impaired. Memory is impaired.         Judgment: Judgment is impulsive.         Fluids    Intake/Output Summary (Last 24 hours) at 4/4/2020 0821  Last data filed at 4/4/2020 0725  Gross per 24 hour   Intake 410 ml   Output 16576 ml   Net -69532 ml       Laboratory  Recent Labs     04/03/20 2040 04/04/20  0318   WBC 9.9 9.9   RBC 3.34* 3.34*   HEMOGLOBIN 11.3* 11.4*   HEMATOCRIT 34.8* 35.0*   .2* 104.8*   MCH 33.8* 34.1*   MCHC 32.5* 32.6*   RDW 52.8* 52.4*   PLATELETCT 176 177   MPV 8.4* 8.9*     Recent Labs     04/03/20 2040 04/04/20  0318   SODIUM 138 139   POTASSIUM 5.0 4.4   CHLORIDE 101 102   CO2 27 25   GLUCOSE 125* 158*   BUN 59* 52*   CREATININE 1.90* 1.76*   CALCIUM 8.7 8.8                   Imaging  DX-CHEST-PORTABLE (1 VIEW)   Final Result      Suspicious for a right seventh posterior rib osteoblastic metastasis. Recommend clinical correlation, including PSA      Otherwise stable chest with aortic ectasia but no consolidation      CT-PELVIS W/O   Final Result      1.  Inferior right pubic ramus subacute fracture with slightly expansile bony change. This is adjacent to an area of bony sclerosis. This is worrisome for an osteolytic metastasis. An isolated inferior ramus subacute fracture is possible but would be    uncommon unless there were a direct blow to this region. Recommend clinical correlation      2.  Right parasymphyseal and left inferior pubic ramus sclerosis raises possibility of osteoblastic metastases.      3.   Severely enlarged prostate gland could be from BPH. Underlying prostate carcinoma is possible. There is evidence of chronic bladder outlet obstruction with severe trabeculation, moderate to severe bladder dilatation, multiple diverticula. Mucosal    sessile malignancy not excluded      4.  Mild circumferential rectal wall thickening could be from increased distention although recommend clinical correlation for guaiac positive stool as malignancy or proctitis are in the differential      CT-HEAD W/O   Final Result      No noncontrast CT evidence of acute intracranial hemorrhage.      Stable moderate white matter hypodensity is present.  This is a nonspecific finding which usually is found to represent chronic microvascular disease in patient's of this demographic.  Demyelination, age indeterminant ischemia and gliosis are also common    possibilities.      Age-appropriate cerebral volume loss.      MR-LUMBAR SPINE-W/O    (Results Pending)   MR-THORACIC SPINE-W/O    (Results Pending)        Assessment/Plan  Urinary obstruction- (present on admission)  Assessment & Plan  Due to prostate enlargement  Continue fajardo catheter  Urology was called last night  Start flomax    Prostate cancer (HCC)- (present on admission)  Assessment & Plan  concern for metastatic disease on CT scan  MRI of thoracic and lumbar spine is ordered and pending  Urinary obstruction improved with fajardo catheter placement, I confirmed with his caregiver, Carmen, that the fajardo can be cared for on discharge home    Dementia (HCC)- (present on admission)  Assessment & Plan  Chronic, also with depression, continue Celexa      DNR (do not resuscitate)  Assessment & Plan  I reviewed the POLST on file from 2016, I also discussed code status with his caregiver, Carmen and daughter, show state he is still DNR and are aware of his wishes for no tube feeds or dialysis if indicated  Hospice is being considered if the diagnosis of metastatic cancer is most  likely    Anemia, unspecified- (present on admission)  Assessment & Plan  Macrocytic anemia on labs  Stable, will monitor    CKD (chronic kidney disease) stage 4, GFR 15-29 ml/min ([facility])- (present on admission)  Assessment & Plan  Chronic, improved with some fluids overnight  Monitor labs    GERD (gastroesophageal reflux disease)- (present on admission)  Assessment & Plan   omeprazole       In addition to the patient's examination and care I spent from 7:54 until 8:24 discussing end of life and advanced care planning such as tube feeds, CPR, intubation and dialysis with his caregiver who is also in direct communication with the patient's daughter and POLST is on file  VTE prophylaxis: ambulatory

## 2020-04-04 NOTE — ED NOTES
Pt was resting well in no apparent distress for a great deal of time. Staff rounded on room and found pt confused after waking, standing naked with indwelling urinary catheter intact and still secured by stat-lock. Pt removed IV and, when staff reoriented, became compliant with care. Pt was cleaned and new linens placed on stretcher. Sitter with pt while awaiting cleaned bed.

## 2020-04-04 NOTE — THERAPY
"Occupational Therapy Evaluation completed.   Functional Status:  Pt is a 89 y/o male, admit with recent fall , increasing confusion and abdominal pain. Pt lives with a caregiver, unclear on his PLOF- as at the time of eval he was confused and unable to give his history. Pt presents with confusion, agitation, decreased activity tolerance, requires Mod A for functional transfers and UB self care, Max A for LB. Pt will benefit from Acute OT services, as he is not at his reported baseline.  Plan of Care: Will benefit from Occupational Therapy 5 times per week  Discharge Recommendations:  Equipment: Will Continue to Assess for Equipment Needs. Post-acute therapy Recommend post-acute placement for additional occupational therapy services prior to discharge home, vs home with caregiver- dependent upon progress with therapy.    See \"Rehab Therapy-Acute\" Patient Summary Report for complete documentation.    "

## 2020-04-04 NOTE — PROGRESS NOTES
2 RN skin check. Scattered bruising to arms and legs. Sacrum is pink and blanching. Otherwise intact.

## 2020-04-04 NOTE — CARE PLAN
Received report from noc shift nurse.   Assumed pt care at approximately 0700.  Pt is A&Ox1, resting comfortably in bed.   Pt on r.a.. No signs of SOB/respiratory distress.   Labs noted, VSS.   Pt c/o no pain at this moment.   Assessment completed, soft restraints present with order on both wrists, assessment being done every 2 hours. Conley catheter present, urine color is red with small clots present.   Fall precautions in place.   Bed at lowest position.   Call light and personal belongings within reach. Continue to monitor         Problem: Psychosocial Needs:  Goal: Level of anxiety will decrease  Outcome: PROGRESSING AS EXPECTED     Problem: Urinary Elimination:  Goal: Ability to reestablish a normal urinary elimination pattern will improve  Outcome: PROGRESSING AS EXPECTED

## 2020-04-05 PROBLEM — N18.4 ACUTE RENAL FAILURE SUPERIMPOSED ON STAGE 4 CHRONIC KIDNEY DISEASE (HCC): Status: ACTIVE | Noted: 2020-04-05

## 2020-04-05 PROBLEM — M48.061 LUMBAR CANAL STENOSIS: Status: ACTIVE | Noted: 2020-04-05

## 2020-04-05 PROBLEM — N17.9 ACUTE RENAL FAILURE SUPERIMPOSED ON STAGE 4 CHRONIC KIDNEY DISEASE (HCC): Status: ACTIVE | Noted: 2020-04-05

## 2020-04-05 LAB
ANION GAP SERPL CALC-SCNC: 12 MMOL/L (ref 7–16)
BUN SERPL-MCNC: 39 MG/DL (ref 8–22)
CALCIUM SERPL-MCNC: 8.8 MG/DL (ref 8.4–10.2)
CHLORIDE SERPL-SCNC: 102 MMOL/L (ref 96–112)
CO2 SERPL-SCNC: 25 MMOL/L (ref 20–33)
CREAT SERPL-MCNC: 1.54 MG/DL (ref 0.5–1.4)
GLUCOSE SERPL-MCNC: 97 MG/DL (ref 65–99)
POTASSIUM SERPL-SCNC: 4.3 MMOL/L (ref 3.6–5.5)
SODIUM SERPL-SCNC: 139 MMOL/L (ref 135–145)

## 2020-04-05 PROCEDURE — 36415 COLL VENOUS BLD VENIPUNCTURE: CPT

## 2020-04-05 PROCEDURE — 700105 HCHG RX REV CODE 258: Performed by: INTERNAL MEDICINE

## 2020-04-05 PROCEDURE — 80048 BASIC METABOLIC PNL TOTAL CA: CPT

## 2020-04-05 PROCEDURE — 700102 HCHG RX REV CODE 250 W/ 637 OVERRIDE(OP): Performed by: INTERNAL MEDICINE

## 2020-04-05 PROCEDURE — 770001 HCHG ROOM/CARE - MED/SURG/GYN PRIV*

## 2020-04-05 PROCEDURE — A9270 NON-COVERED ITEM OR SERVICE: HCPCS | Performed by: INTERNAL MEDICINE

## 2020-04-05 PROCEDURE — 99233 SBSQ HOSP IP/OBS HIGH 50: CPT | Performed by: INTERNAL MEDICINE

## 2020-04-05 PROCEDURE — 97163 PT EVAL HIGH COMPLEX 45 MIN: CPT

## 2020-04-05 RX ADMIN — TAMSULOSIN HYDROCHLORIDE 0.4 MG: 0.4 CAPSULE ORAL at 08:51

## 2020-04-05 RX ADMIN — CITALOPRAM HYDROBROMIDE 10 MG: 20 TABLET ORAL at 06:03

## 2020-04-05 RX ADMIN — SODIUM CHLORIDE, POTASSIUM CHLORIDE, SODIUM LACTATE AND CALCIUM CHLORIDE: 600; 310; 30; 20 INJECTION, SOLUTION INTRAVENOUS at 03:00

## 2020-04-05 RX ADMIN — OMEPRAZOLE 20 MG: 20 CAPSULE, DELAYED RELEASE ORAL at 06:03

## 2020-04-05 ASSESSMENT — COGNITIVE AND FUNCTIONAL STATUS - GENERAL
MOVING FROM LYING ON BACK TO SITTING ON SIDE OF FLAT BED: A LOT
TURNING FROM BACK TO SIDE WHILE IN FLAT BAD: A LOT
SUGGESTED CMS G CODE MODIFIER MOBILITY: CL
TOILETING: A LOT
EATING MEALS: TOTAL
CLIMB 3 TO 5 STEPS WITH RAILING: TOTAL
MOVING TO AND FROM BED TO CHAIR: A LOT
DAILY ACTIVITIY SCORE: 17
MOBILITY SCORE: 11
HELP NEEDED FOR BATHING: A LITTLE
CLIMB 3 TO 5 STEPS WITH RAILING: A LITTLE
MOVING FROM LYING ON BACK TO SITTING ON SIDE OF FLAT BED: A LITTLE
PERSONAL GROOMING: A LITTLE
MOVING TO AND FROM BED TO CHAIR: A LITTLE
TURNING FROM BACK TO SIDE WHILE IN FLAT BAD: A LITTLE
SUGGESTED CMS G CODE MODIFIER MOBILITY: CK
WALKING IN HOSPITAL ROOM: A LITTLE
WALKING IN HOSPITAL ROOM: A LOT
SUGGESTED CMS G CODE MODIFIER DAILY ACTIVITY: CK
MOBILITY SCORE: 18
STANDING UP FROM CHAIR USING ARMS: A LOT
STANDING UP FROM CHAIR USING ARMS: A LITTLE

## 2020-04-05 ASSESSMENT — GAIT ASSESSMENTS
GAIT LEVEL OF ASSIST: MODERATE ASSIST
ASSISTIVE DEVICE: HAND HELD ASSIST
DISTANCE (FEET): 12

## 2020-04-05 NOTE — ASSESSMENT & PLAN NOTE
Due  To urinary outlet obstruction due to large prostate  Urology following and fajardo placed  Labs better after fajardo placed, will repeat

## 2020-04-05 NOTE — PROGRESS NOTES
Pt tries to get up couple of times but after repositioned and partial changed pt goes back to sleeping. Bed alarm is on and wrist restraint. Rounding in place.

## 2020-04-05 NOTE — THERAPY
"Physical Therapy Evaluation completed.   Bed Mobility:  Supine to Sit: Moderate Assist  Transfers: Sit to Stand: Moderate Assist  Gait: Level Of Assist: Moderate Assist with Will Continue to Assess for Equipment Needs       Plan of Care: Will benefit from Physical Therapy 5 times per week  Discharge Recommendations: Equipment: Will Continue to Assess for Equipment Needs. Recommend post-acute placement for continued physical therapy services prior to discharge home.       See \"Rehab Therapy-Acute\" Patient Summary Report for complete documentation.     Pt was recently admitted for GLF and L hip pain. Per medical chart patient has metatstic lesions in pelvise, urinary obstruction, and lumbar stenosis. Pt presented to PT with impaired balance, impaired coordination, impaired gait, weakness, poor squencing/motor planning, confusion, and dec activity tolerance. Pt was able to demo Mod A for all functional mobility with close HHA and assist around the hips. Pt demonstrates with poor gross motor corrdination in BLE and dec general locmotion. Pt requires frquent cues and redirection given hx of dementia. Pt will continue to benefit from skilled PT while in house, with recommendation for post acute therapy prior to d/c home. If caregiver is able to provide 24/7 assist with current functional mobility then pt maybe able to d/c home with HH therapy, however, this is unclear as prior level of function is undetermined at this time. Will continue to follow and udpate POC.   "

## 2020-04-05 NOTE — PROGRESS NOTES
[facility] Medicine Daily Progress Note    Date of Service  4/5/2020    Chief Complaint  89 y.o. male admitted 4/3/2020 with low abdominal pain    Hospital Course    This is a 89 y.o. male who presented 4/3/2020 with abdominal and hip pain.  He was brought in by his caregiver who noticed the patient was more confused than normal and he was grabbing his lower abdomen complaining of pain. He did fall two days prior to presentation. Apparently patient has been having difficulty urinating.  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  Urology was consulted by the emergency physician, they did request MRI of the thoracic and lumbar spine. He had a fajardo catheter placed and was started on iv fluids.      Interval Problem Update  The patient has lumbar stenosis on MRI  He requires maximum assistance with mobility with therapy's assessment    Consultants/Specialty  urology    Code Status  DNR confirmed    Disposition  Home with caregiver/hospice    Review of Systems  Review of Systems   Unable to perform ROS: Dementia        Physical Exam  Temp:  [37.4 °C (99.3 °F)-37.4 °C (99.4 °F)] 37.4 °C (99.3 °F)  Pulse:  [57-93] 93  Resp:  [16-18] 18  BP: (128-156)/() 148/64  SpO2:  [90 %-98 %] 90 %    Physical Exam  Vitals signs and nursing note reviewed.   Constitutional:       General: He is not in acute distress.  HENT:      Head: Atraumatic.      Nose: No congestion or rhinorrhea.      Mouth/Throat:      Mouth: Mucous membranes are moist.      Pharynx: No oropharyngeal exudate or posterior oropharyngeal erythema.   Eyes:      General: No scleral icterus.     Pupils: Pupils are equal, round, and reactive to light.   Neck:      Musculoskeletal: Neck supple. No neck rigidity.   Cardiovascular:      Rate and Rhythm: Normal rate and regular rhythm.      Heart sounds: Normal heart sounds. No friction rub.   Pulmonary:      Effort: No  respiratory distress.      Breath sounds: No stridor. No wheezing.   Abdominal:      General: Bowel sounds are normal. There is no distension.      Palpations: There is no mass.      Tenderness: There is no abdominal tenderness.   Musculoskeletal:         General: No tenderness.   Skin:     General: Skin is warm and dry.      Coloration: Skin is not pale.   Neurological:      Mental Status: He is alert. Mental status is at baseline. He is disoriented.      Motor: No weakness.      Coordination: Coordination abnormal.   Psychiatric:         Mood and Affect: Mood normal.         Cognition and Memory: Cognition is impaired. Memory is impaired.         Judgment: Judgment is impulsive.         Fluids    Intake/Output Summary (Last 24 hours) at 4/5/2020 0908  Last data filed at 4/5/2020 0300  Gross per 24 hour   Intake --   Output 2125 ml   Net -2125 ml       Laboratory  Recent Labs     04/03/20 2040 04/04/20  0318   WBC 9.9 9.9   RBC 3.34* 3.34*   HEMOGLOBIN 11.3* 11.4*   HEMATOCRIT 34.8* 35.0*   .2* 104.8*   MCH 33.8* 34.1*   MCHC 32.5* 32.6*   RDW 52.8* 52.4*   PLATELETCT 176 177   MPV 8.4* 8.9*     Recent Labs     04/03/20 2040 04/04/20  0318   SODIUM 138 139   POTASSIUM 5.0 4.4   CHLORIDE 101 102   CO2 27 25   GLUCOSE 125* 158*   BUN 59* 52*   CREATININE 1.90* 1.76*   CALCIUM 8.7 8.8                   Imaging  MR-THORACIC SPINE-W/O   Final Result      1.  Lesion in the T9 vertebral body moderately suspicious for metastatic disease. Further assessment could be performed with bone scan and correlation with PSA.   2.  Multilevel multifactorial degenerative changes in the thoracic spine   3.  Incompletely evaluated degenerative changes in the cervical spine   4.  Chronic appearing T12 vertebral compression fracture   5.  Enlarged retrocrural lymph node   6.  Trace RIGHT pleural effusion      MR-LUMBAR SPINE-W/O   Final Result      1.  No acute or suspicious osseous lesion   2.  Multilevel multifactorial  degenerative changes   3.  Central canal narrowing worst at L2-L3 where it is severe   4.  Other areas of central canal and neural foraminal narrowing as described above   5.  Enlarged retrocrural, retroperitoneal and pelvic lymph nodes      DX-CHEST-PORTABLE (1 VIEW)   Final Result      Suspicious for a right seventh posterior rib osteoblastic metastasis. Recommend clinical correlation, including PSA      Otherwise stable chest with aortic ectasia but no consolidation      CT-PELVIS W/O   Final Result      1.  Inferior right pubic ramus subacute fracture with slightly expansile bony change. This is adjacent to an area of bony sclerosis. This is worrisome for an osteolytic metastasis. An isolated inferior ramus subacute fracture is possible but would be    uncommon unless there were a direct blow to this region. Recommend clinical correlation      2.  Right parasymphyseal and left inferior pubic ramus sclerosis raises possibility of osteoblastic metastases.      3.  Severely enlarged prostate gland could be from BPH. Underlying prostate carcinoma is possible. There is evidence of chronic bladder outlet obstruction with severe trabeculation, moderate to severe bladder dilatation, multiple diverticula. Mucosal    sessile malignancy not excluded      4.  Mild circumferential rectal wall thickening could be from increased distention although recommend clinical correlation for guaiac positive stool as malignancy or proctitis are in the differential      CT-HEAD W/O   Final Result      No noncontrast CT evidence of acute intracranial hemorrhage.      Stable moderate white matter hypodensity is present.  This is a nonspecific finding which usually is found to represent chronic microvascular disease in patient's of this demographic.  Demyelination, age indeterminant ischemia and gliosis are also common    possibilities.      Age-appropriate cerebral volume loss.           Assessment/Plan  Urinary obstruction- (present on  admission)  Assessment & Plan  Fajardo, flomax  Lumbar stenosis on imaging  Urology following      Prostate cancer (HCC)- (present on admission)  Assessment & Plan  With metastatic disease  Continue flomax and fajardo catheter  Hospice discussing with family    Acute renal failure superimposed on stage 4 chronic kidney disease (HCC)  Assessment & Plan  Due  To urinary outlet obstruction due to large prostate  Urology following and fajardo placed  Labs better after fajardo placed, will repeat    Lumbar canal stenosis  Assessment & Plan  Severe, due to disc bulge, no evidence of metastasis at the lumbar level  Surgery not an appropriate option for this patient given his dementia and metastatic cancer in other areas  Ambulate as tolerated, patient is max assist with occupational therapy  Hospice referral placed    Dementia ([facility])- (present on admission)  Assessment & Plan  Chronic, also with depression, continue Celexa      DNR (do not resuscitate)  Assessment & Plan  Code status confirmed with family and caregiver    Anemia, unspecified- (present on admission)  Assessment & Plan  Macrocytic anemia stable    CKD (chronic kidney disease) stage 4, GFR 15-29 ml/min ([facility])- (present on admission)  Assessment & Plan  Chronic, improved with some fluids   Will repeat labs    GERD (gastroesophageal reflux disease)- (present on admission)  Assessment & Plan   omeprazole       VTE prophylaxis: ambulatory

## 2020-04-05 NOTE — PROGRESS NOTES
At 0830 PT took pts restraints off so pt could sit in chair. Pt sat in chair for an hour and the went back to bed. Pt has been laying in bed without restraints.

## 2020-04-05 NOTE — ASSESSMENT & PLAN NOTE
Severe, due to disc bulge, no evidence of metastasis at the lumbar level  Surgery not an appropriate option for this patient given his dementia and metastatic cancer in other areas  Ambulate as tolerated, patient is max assist with occupational therapy  Hospice referral placed

## 2020-04-05 NOTE — PROGRESS NOTES
Bed side report received from Joey HOUSTON. Pt was asleep. Wrist restraint on bed alarm on. All needs met at this time.Bed in low position.Call light within reach. Rounding in place.

## 2020-04-06 ENCOUNTER — APPOINTMENT (OUTPATIENT)
Dept: RADIOLOGY | Facility: MEDICAL CENTER | Age: 85
End: 2020-04-06
Attending: INTERNAL MEDICINE
Payer: MEDICARE

## 2020-04-06 VITALS
DIASTOLIC BLOOD PRESSURE: 69 MMHG | TEMPERATURE: 98.5 F | HEART RATE: 70 BPM | BODY MASS INDEX: 21.4 KG/M2 | RESPIRATION RATE: 18 BRPM | HEIGHT: 60 IN | OXYGEN SATURATION: 94 % | WEIGHT: 109.02 LBS | SYSTOLIC BLOOD PRESSURE: 149 MMHG

## 2020-04-06 PROCEDURE — 99239 HOSP IP/OBS DSCHRG MGMT >30: CPT | Mod: GW | Performed by: INTERNAL MEDICINE

## 2020-04-06 PROCEDURE — A9270 NON-COVERED ITEM OR SERVICE: HCPCS | Performed by: INTERNAL MEDICINE

## 2020-04-06 PROCEDURE — 700102 HCHG RX REV CODE 250 W/ 637 OVERRIDE(OP): Performed by: INTERNAL MEDICINE

## 2020-04-06 RX ADMIN — SENNOSIDES AND DOCUSATE SODIUM 2 TABLET: 8.6; 5 TABLET ORAL at 06:02

## 2020-04-06 RX ADMIN — TAMSULOSIN HYDROCHLORIDE 0.4 MG: 0.4 CAPSULE ORAL at 08:12

## 2020-04-06 RX ADMIN — OMEPRAZOLE 20 MG: 20 CAPSULE, DELAYED RELEASE ORAL at 06:02

## 2020-04-06 RX ADMIN — CITALOPRAM HYDROBROMIDE 10 MG: 20 TABLET ORAL at 06:02

## 2020-04-06 ASSESSMENT — PAIN SCALES - PAIN ASSESSMENT IN ADVANCED DEMENTIA (PAINAD)
TOTALSCORE: 0
FACIALEXPRESSION: SMILING OR INEXPRESSIVE
BODYLANGUAGE: RELAXED
BREATHING: NORMAL
CONSOLABILITY: NO NEED TO CONSOLE

## 2020-04-06 NOTE — THERAPY
Occupational Therapy- Plan is for pt to transfer home today via [facility] with Hospice and 24 hour caregiver. Does not appear appropriate for further skilled occupational therapy intervention in this setting. Will discharge from OT services at this time.

## 2020-04-06 NOTE — DISCHARGE PLANNING
Received Transport Form @ 1000  Spoke to Arturo Pereyra    Transport is scheduled for 4/6 @ 1300 going to pt home @ [address] with [facility].    Zoila HOUSTON CM notified of transport time.

## 2020-04-06 NOTE — DISCHARGE INSTRUCTIONS
Discharge Instructions    Discharged to home by car with relative. Discharged via wheelchair, hospital escort: Yes.  Special equipment needed: Not Applicable    Be sure to schedule a follow-up appointment with your primary care doctor or any specialists as instructed.     Discharge Plan:     I understand that a diet low in cholesterol, fat, and sodium is recommended for good health. Unless I have been given specific instructions below for another diet, I accept this instruction as my diet prescription.   Other diet: n/a    Special Instructions: Discharge Instructions per Sofia Lincoln M.D.    Follow up with  Hospice at home    DIET: regular    ACTIVITY: as tolerated with assistance    DIAGNOSIS: metastatic prostate cancer with bladder outlet obstruction    Return to ER if symptoms worsen    Conley Catheter Care, Adult  A Conley catheter is a soft, flexible tube that is placed into the bladder to drain urine. A Conley catheter may be inserted if:  · You leak urine or are not able to control when you urinate (urinary incontinence).  · You are not able to urinate when you need to (urinary retention).  · You had prostate surgery or surgery on the genitals.  · You have certain medical conditions, such as multiple sclerosis, dementia, or a spinal cord injury.  If you are going home with a Conley catheter in place, follow the instructions below.  TAKING CARE OF THE CATHETER  1. Wash your hands with soap and water.  2. Using mild soap and warm water on a clean washcloth:  ¨ Clean the area on your body closest to the catheter insertion site using a circular motion, moving away from the catheter. Never wipe toward the catheter because this could sweep bacteria up into the urethra and cause infection.  ¨ Remove all traces of soap. Pat the area dry with a clean towel. For males, reposition the foreskin.  3. Attach the catheter to your leg so there is no tension on the catheter. Use adhesive tape or a leg strap. If you are using  adhesive tape, remove any sticky residue left behind by the previous tape you used.  4. Keep the drainage bag below the level of the bladder, but keep it off the floor.  5. Check throughout the day to be sure the catheter is working and urine is draining freely. Make sure the tubing does not become kinked.  6. Do not pull on the catheter or try to remove it. Pulling could damage internal tissues.  TAKING CARE OF THE DRAINAGE BAGS  You will be given two drainage bags to take home. One is a large overnight drainage bag, and the other is a smaller leg bag that fits underneath clothing. You may wear the overnight bag at any time, but you should never wear the smaller leg bag at night. Follow the instructions below for how to empty, change, and clean your drainage bags.  Emptying the Drainage Bag  You must empty your drainage bag when it is  -½ full or at least 2-3 times a day.  1. Wash your hands with soap and water.  2. Keep the drainage bag below your hips, below the level of your bladder. This stops urine from going back into the tubing and into your bladder.  3. Hold the dirty bag over the toilet or a clean container.  4. Open the pour spout at the bottom of the bag and empty the urine into the toilet or container. Do not let the pour spout touch the toilet, container, or any other surface. Doing so can place bacteria on the bag, which can cause an infection.  5. Clean the pour spout with a gauze pad or cotton ball that has rubbing alcohol on it.  6. Close the pour spout.  7. Attach the bag to your leg with adhesive tape or a leg strap.  8. Wash your hands well.  Changing the Drainage Bag  Change your drainage bag once a month or sooner if it starts to smell bad or look dirty. Below are steps to follow when changing the drainage bag.  1. Wash your hands with soap and water.  2. Pinch off the rubber catheter so that urine does not spill out.  3. Disconnect the catheter tube from the drainage tube at the connection  valve. Do not let the tubes touch any surface.  4. Clean the end of the catheter tube with an alcohol wipe. Use a different alcohol wipe to clean the end of the drainage tube.  5. Connect the catheter tube to the drainage tube of the clean drainage bag.  6. Attach the new bag to the leg with adhesive tape or a leg strap. Avoid attaching the new bag too tightly.  7. Wash your hands well.  Cleaning the Drainage Bag  1. Wash your hands with soap and water.  2. Wash the bag in warm, soapy water.  3. Rinse the bag thoroughly with warm water.  4. Fill the bag with a solution of white vinegar and water (1 cup vinegar to 1 qt warm water [.2 L vinegar to 1 L warm water]). Close the bag and soak it for 30 minutes in the solution.  5. Rinse the bag with warm water.  6. Hang the bag to dry with the pour spout open and hanging downward.  7. Store the clean bag (once it is dry) in a clean plastic bag.  8. Wash your hands well.  PREVENTING INFECTION  · Wash your hands before and after handling your catheter.  · Take showers daily and wash the area where the catheter enters your body. Do not take baths. Replace wet leg straps with dry ones, if this applies.  · Do not use powders, sprays, or lotions on the genital area. Only use creams, lotions, or ointments as directed by your caregiver.  · For females, wipe from front to back after each bowel movement.  · Drink enough fluids to keep your urine clear or pale yellow unless you have a fluid restriction.  · Do not let the drainage bag or tubing touch or lie on the floor.  · Wear cotton underwear to absorb moisture and to keep your .  SEEK MEDICAL CARE IF:   · Your urine is cloudy or smells unusually bad.  · Your catheter becomes clogged.  · You are not draining urine into the bag or your bladder feels full.  · Your catheter starts to leak.  SEEK IMMEDIATE MEDICAL CARE IF:   · You have pain, swelling, redness, or pus where the catheter enters the body.  · You have pain in the  abdomen, legs, lower back, or bladder.  · You have a fever.  · You see blood fill the catheter, or your urine is pink or red.  · You have nausea, vomiting, or chills.  · Your catheter gets pulled out.  MAKE SURE YOU:   · Understand these instructions.  · Will watch your condition.  · Will get help right away if you are not doing well or get worse.     This information is not intended to replace advice given to you by your health care provider. Make sure you discuss any questions you have with your health care provider.     Document Released: 12/18/2006 Document Revised: 05/04/2015 Document Reviewed: 12/09/2013  "[employer] Interactive Patient Education ©2016 [employer]    · Is patient discharged on Warfarin / Coumadin?   No     Depression / Suicide Risk    As you are discharged from this [facility] facility, it is important to learn how to keep safe from harming yourself.    Recognize the warning signs:  · Abrupt changes in personality, positive or negative- including increase in energy   · Giving away possessions  · Change in eating patterns- significant weight changes-  positive or negative  · Change in sleeping patterns- unable to sleep or sleeping all the time   · Unwillingness or inability to communicate  · Depression  · Unusual sadness, discouragement and loneliness  · Talk of wanting to die  · Neglect of personal appearance   · Rebelliousness- reckless behavior  · Withdrawal from people/activities they love  · Confusion- inability to concentrate     If you or a loved one observes any of these behaviors or has concerns about self-harm, here's what you can do:  · Talk about it- your feelings and reasons for harming yourself  · Remove any means that you might use to hurt yourself (examples: pills, rope, extension cords, firearm)  · Get professional help from the community (Mental Health, Substance Abuse, psychological counseling)  · Do not be alone:Call your Safe Contact- someone whom you trust who will be  there for you.  · Call your local CRISIS HOTLINE 242-2539 or 793-375-7169  · Call your local Children's Mobile Crisis Response Team [place] (316) 653-8898 or www.[employer]k  · Call the toll free National Suicide Prevention Hotlines   · National Suicide Prevention Lifeline 162-103-UOIN (8490)  · National [employer] Line Network 800-SUICIDE (318-7077)

## 2020-04-06 NOTE — DISCHARGE SUMMARY
Discharge Summary    CHIEF COMPLAINT ON ADMISSION  Chief Complaint   Patient presents with   • Hip Pain   • Abdominal Pain       Reason for Admission  Hip Pain     Admission Date  4/3/2020    CODE STATUS  DNAR/DNI    HPI & HOSPITAL COURSE   This is a 89 y.o. male who presented 4/3/2020 with abdominal and hip pain.  He was brought in by his caregiver who noticed the patient was more confused than normal and he was grabbing his lower abdomen complaining of pain. He did fall two days prior to presentation. Apparently patient has been having difficulty urinating.  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  Urology was consulted by the emergency physician, they did request MRI of the thoracic and lumbar spine. He had a fajardo catheter placed and was started on iv fluids. MRI of the spine showed some bone involvement of the metastatic lesion at T9 and compression fracture at T12 with no cord impingement and a bulging discs in the lumbar spine with Central canal narrowing worst at L2-L3 where it is severe. These results were discussed with the patient's daughter and caregiver and surgery was declined given the patient's advanced age and dementia.     With a fajardo catheter in place his creatinine did improve to 1.54 and he had relief of his low abdominal discomfort. A PSA level was remarkable at 758.    He was placed on hospice after discussion with his daughter and findings from his MRI.  Therefore, he is discharged in fair and stable condition to hospice.    The patient met 2-midnight criteria for an inpatient stay at the time of discharge.    Discharge Date  4/6/20    FOLLOW UP ITEMS POST DISCHARGE  Hospice day after discharge home, continue fajardo catheter care  Urology as needed for fajardo catheter follow up    DISCHARGE DIAGNOSES  Active Problems:    Urinary obstruction POA: Yes    Prostate cancer (HCC) (Chronic) POA: Yes       Overview: IMO load March 2020    GERD (gastroesophageal reflux disease) (Chronic) POA: Yes    CKD (chronic kidney disease) stage 4, GFR 15-29 ml/min ([facility]) POA: Yes    Anemia, unspecified POA: Yes    DNR (do not resuscitate) POA: Yes    Dementia (HCC) POA: Yes    Lumbar canal stenosis POA: Yes    Acute renal failure superimposed on stage 4 chronic kidney disease (HCC) POA: Yes  Resolved Problems:    * No resolved hospital problems. *      FOLLOW UP  No future appointments.  Stacy Cm M.D.  [address]  [address]  [facility] [address]  [phone]      As needed    [facility]  [address]  [facility] [address] [phone]  In 1 day        MEDICATIONS ON DISCHARGE     Medication List      CONTINUE taking these medications      Instructions   acetaminophen 325 MG Tabs  Commonly known as:  TYLENOL   Take 325 mg by mouth every 6 hours as needed for Moderate Pain.  Dose:  325 mg     citalopram 10 MG tablet  Commonly known as:  CELEXA   TAKE ONE TABLET BY MOUTH EVERY NIGHT AT BEDTIME.     melatonin 3 MG Tabs   Take 3 mg by mouth every bedtime.  Dose:  3 mg     omeprazole 20 MG delayed-release capsule  Commonly known as:  PRILOSEC   TAKE ONE CAPSULE BY MOUTH ONE TIME DAILY     STOOL SOFTENER PO   Take 1 Tab by mouth 2 Times a Day.  Dose:  1 Tab     vitamin B-12 1000 MCG Tabs   Take 1,000 mcg by mouth every day.  Dose:  1,000 mcg            Allergies  No Known Allergies    DIET  Orders Placed This Encounter   Procedures   • Diet Order Regular     Standing Status:   Standing     Number of Occurrences:   1     Order Specific Question:   Diet:     Answer:   Regular [1]       ACTIVITY  As tolerated.  Weight bearing as tolerated    CONSULTATIONS  Urology Dr. Denny    PROCEDURES  none    LABORATORY  Lab Results   Component Value Date    SODIUM 139 04/05/2020    POTASSIUM 4.3 04/05/2020    CHLORIDE 102 04/05/2020    CO2 25 04/05/2020    GLUCOSE 97 04/05/2020    BUN 39 (H) 04/05/2020    CREATININE 1.54  (H) 04/05/2020        Lab Results   Component Value Date    WBC 9.9 04/04/2020    HEMOGLOBIN 11.4 (L) 04/04/2020    HEMATOCRIT 35.0 (L) 04/04/2020    PLATELETCT 177 04/04/2020        Total time of the discharge process exceeds 57 minutes.

## 2020-04-06 NOTE — PROGRESS NOTES
Report received from night RN. Assumed care of patient. Daily plan of care discussed. Pt awake and alert, able to eat breakfast this AM. Pt remains A/Ox1, but denies discomfort. Conley catheter remains in place, draining pink urine. Bed alarm on at all times. Hourly rounding in place.

## 2020-04-06 NOTE — DISCHARGE PLANNING
Called pts caregiver regarding DC plan. Caregiver states pt will have 24/7 supervision. Caregiver will be at pts house waiting for him.     Called [facility] regarding DC plan. They have accepted pt and are expecting to admit him today.     Faxed [facility] and transportation form to [facility].

## 2020-04-06 NOTE — PROGRESS NOTES
Pt discharged to home via [facility] with escort. Discharge paperwork reviewed, pt unable to sign. VSS. Pt escorted off unit via wheelchair with hospital staff.    Conley care instructions and leg bag sent home with pt.

## 2020-04-06 NOTE — PROGRESS NOTES
Note to reader: this note follows the APSO format rather than the historical SOAP format. Assessment and plan located at the top of the note for ease of use.    Chief Complaint  89 y.o. year old male here with metastatic prostate cancer, urinary retention, ANNE-MARIE.     Assessment/Plan  Interval History   89 y.o. year old male here with metastatic prostate cancer, urinary retention, ANNE-MARIE.     Plan:  Urology signing off. We do not anticipate any acute urological care at this time.   DC with fajardo.   Please call with any questions or concerns.        Patient seen and examined    4/6. Patient resting in bed, unable to be fully aroused. Hx of dementia. Spoke with hospitalist who notes that family and patient are agreeable to hospice care. Fajardo cath in place draining bloody urine. No new labs today.           Review of Systems  Physical Exam   Review of Systems   Unable to perform ROS: Dementia     Vitals:    04/05/20 0400 04/05/20 1044 04/05/20 2233 04/06/20 0500   BP: 148/64 142/69 148/59 159/81   Pulse: 93 70 64 89   Resp: 18 18 18 18   Temp: 37.4 °C (99.3 °F) 36.4 °C (97.6 °F) 37 °C (98.6 °F) 37 °C (98.6 °F)   TempSrc: Temporal Temporal Oral Oral   SpO2: 90% 92% 92% 92%   Weight:       Height:         Physical Exam   Constitutional: No distress.   HENT:   Head: Normocephalic and atraumatic.   Pulmonary/Chest: Effort normal.   Genitourinary:    Genitourinary Comments: Fajardo cath in place draining clear bloody urine     Neurological: He is alert.   Skin: Skin is warm and dry. He is not diaphoretic.   Nursing note and vitals reviewed.       Hematology Chemistry   Lab Results   Component Value Date/Time    WBC 9.9 04/04/2020 03:18 AM    HEMOGLOBIN 11.4 (L) 04/04/2020 03:18 AM    HEMATOCRIT 35.0 (L) 04/04/2020 03:18 AM    PLATELETCT 177 04/04/2020 03:18 AM     Lab Results   Component Value Date/Time    SODIUM 139 04/05/2020 09:12 AM    POTASSIUM 4.3 04/05/2020 09:12 AM    CHLORIDE 102 04/05/2020 09:12 AM    CO2 25 04/05/2020  09:12 AM    GLUCOSE 97 04/05/2020 09:12 AM    BUN 39 (H) 04/05/2020 09:12 AM    CREATININE 1.54 (H) 04/05/2020 09:12 AM         Labs not explicitly included in this progress note were reviewed by the author.   Radiology/imaging not explicitly included in this progress note was reviewed by the author.     Labs reviewed, Medications reviewed and Radiology images reviewed

## 2020-04-07 ENCOUNTER — PATIENT OUTREACH (OUTPATIENT)
Dept: HEALTH INFORMATION MANAGEMENT | Facility: OTHER | Age: 85
End: 2020-04-07

## 2020-07-21 NOTE — TELEPHONE ENCOUNTER
DME: 4-Wheel Walker with Seat  Faxed to [facility]   Fax#[phone]    Notified Carmen via phone     Left lateral leg  Cleanse wound and periwound with wound cleanser or normal saline. Xeroform- apply to wound bed. Secure with abd. Bilateral 2 layer compression with wound and lower extremity assessment. If there is any problem with the dressing (too tight, slides down, etc.) Patient to return to wound clinic to have re-wrapped by clinician. Change 2 times a week, Fridays by [facility] and Tuesdays at wound center. [address] to change dressing on Friday.